# Patient Record
Sex: MALE | Race: WHITE | Employment: UNEMPLOYED | URBAN - METROPOLITAN AREA
[De-identification: names, ages, dates, MRNs, and addresses within clinical notes are randomized per-mention and may not be internally consistent; named-entity substitution may affect disease eponyms.]

---

## 2018-01-01 ENCOUNTER — APPOINTMENT (OUTPATIENT)
Dept: GENERAL RADIOLOGY | Facility: CLINIC | Age: 58
End: 2018-01-01
Attending: ANESTHESIOLOGY
Payer: COMMERCIAL

## 2018-01-01 ENCOUNTER — HOSPITAL ENCOUNTER (INPATIENT)
Facility: CLINIC | Age: 58
LOS: 3 days | Discharge: NURSING, REHAB OR LONG-TERM CARE | End: 2018-05-31
Attending: FAMILY MEDICINE | Admitting: ANESTHESIOLOGY
Payer: COMMERCIAL

## 2018-01-01 ENCOUNTER — APPOINTMENT (OUTPATIENT)
Dept: GENERAL RADIOLOGY | Facility: CLINIC | Age: 58
End: 2018-01-01
Attending: FAMILY MEDICINE
Payer: COMMERCIAL

## 2018-01-01 ENCOUNTER — APPOINTMENT (OUTPATIENT)
Dept: OCCUPATIONAL THERAPY | Facility: CLINIC | Age: 58
End: 2018-01-01
Payer: COMMERCIAL

## 2018-01-01 ENCOUNTER — APPOINTMENT (OUTPATIENT)
Dept: OCCUPATIONAL THERAPY | Facility: CLINIC | Age: 58
End: 2018-01-01
Attending: ANESTHESIOLOGY
Payer: COMMERCIAL

## 2018-01-01 ENCOUNTER — APPOINTMENT (OUTPATIENT)
Dept: CT IMAGING | Facility: CLINIC | Age: 58
End: 2018-01-01
Attending: FAMILY MEDICINE
Payer: COMMERCIAL

## 2018-01-01 VITALS
RESPIRATION RATE: 14 BRPM | DIASTOLIC BLOOD PRESSURE: 80 MMHG | HEART RATE: 114 BPM | HEIGHT: 74 IN | BODY MASS INDEX: 29.65 KG/M2 | TEMPERATURE: 97.9 F | SYSTOLIC BLOOD PRESSURE: 117 MMHG | OXYGEN SATURATION: 98 % | WEIGHT: 231.04 LBS

## 2018-01-01 DIAGNOSIS — K21.9 GASTROESOPHAGEAL REFLUX DISEASE, ESOPHAGITIS PRESENCE NOT SPECIFIED: ICD-10-CM

## 2018-01-01 DIAGNOSIS — K92.2 GI BLEED: ICD-10-CM

## 2018-01-01 DIAGNOSIS — N17.9 AKI (ACUTE KIDNEY INJURY) (H): ICD-10-CM

## 2018-01-01 DIAGNOSIS — A04.72 C. DIFFICILE COLITIS: Primary | ICD-10-CM

## 2018-01-01 DIAGNOSIS — C67.9 MALIGNANT NEOPLASM OF URINARY BLADDER, UNSPECIFIED SITE (H): ICD-10-CM

## 2018-01-01 DIAGNOSIS — E86.0 DEHYDRATION: ICD-10-CM

## 2018-01-01 DIAGNOSIS — I95.89 HYPOTENSION DUE TO BLOOD LOSS: ICD-10-CM

## 2018-01-01 DIAGNOSIS — R58 HYPOTENSION DUE TO BLOOD LOSS: ICD-10-CM

## 2018-01-01 DIAGNOSIS — T83.83XA NEPHROSTOMY TUBE BLEED (H): ICD-10-CM

## 2018-01-01 DIAGNOSIS — R31.0 HEMATURIA, GROSS: ICD-10-CM

## 2018-01-01 DIAGNOSIS — G47.34 NOCTURNAL HYPOXIA: ICD-10-CM

## 2018-01-01 LAB
ABO + RH BLD: NORMAL
ABO + RH BLD: NORMAL
ALBUMIN SERPL-MCNC: 1.4 G/DL (ref 3.4–5)
ALBUMIN SERPL-MCNC: 1.4 G/DL (ref 3.4–5)
ALBUMIN SERPL-MCNC: 1.5 G/DL (ref 3.4–5)
ALBUMIN SERPL-MCNC: 1.5 G/DL (ref 3.4–5)
ALBUMIN SERPL-MCNC: 1.6 G/DL (ref 3.4–5)
ALBUMIN UR-MCNC: 100 MG/DL
ALBUMIN UR-MCNC: 300 MG/DL
ALP SERPL-CCNC: 129 U/L (ref 40–150)
ALP SERPL-CCNC: 133 U/L (ref 40–150)
ALP SERPL-CCNC: 159 U/L (ref 40–150)
ALP SERPL-CCNC: 166 U/L (ref 40–150)
ALP SERPL-CCNC: 205 U/L (ref 40–150)
ALT SERPL W P-5'-P-CCNC: 11 U/L (ref 0–70)
ALT SERPL W P-5'-P-CCNC: 13 U/L (ref 0–70)
ALT SERPL W P-5'-P-CCNC: 18 U/L (ref 0–70)
ALT SERPL W P-5'-P-CCNC: 27 U/L (ref 0–70)
ALT SERPL W P-5'-P-CCNC: 29 U/L (ref 0–70)
ANION GAP SERPL CALCULATED.3IONS-SCNC: 10 MMOL/L (ref 3–14)
ANION GAP SERPL CALCULATED.3IONS-SCNC: 11 MMOL/L (ref 3–14)
ANION GAP SERPL CALCULATED.3IONS-SCNC: 11 MMOL/L (ref 3–14)
ANION GAP SERPL CALCULATED.3IONS-SCNC: 9 MMOL/L (ref 3–14)
APPEARANCE UR: ABNORMAL
APPEARANCE UR: ABNORMAL
APTT PPP: 29 SEC (ref 22–37)
AST SERPL W P-5'-P-CCNC: 22 U/L (ref 0–45)
AST SERPL W P-5'-P-CCNC: 24 U/L (ref 0–45)
AST SERPL W P-5'-P-CCNC: 30 U/L (ref 0–45)
AST SERPL W P-5'-P-CCNC: 52 U/L (ref 0–45)
AST SERPL W P-5'-P-CCNC: 54 U/L (ref 0–45)
BACTERIA #/AREA URNS HPF: ABNORMAL /HPF
BACTERIA #/AREA URNS HPF: ABNORMAL /HPF
BACTERIA SPEC CULT: ABNORMAL
BACTERIA SPEC CULT: ABNORMAL
BACTERIA SPEC CULT: NO GROWTH
BACTERIA SPEC CULT: NORMAL
BASE DEFICIT BLDA-SCNC: 3.2 MMOL/L
BASE DEFICIT BLDA-SCNC: 3.6 MMOL/L
BASOPHILS # BLD AUTO: 0 10E9/L (ref 0–0.2)
BASOPHILS NFR BLD AUTO: 0.1 %
BASOPHILS NFR BLD AUTO: 0.2 %
BILIRUB SERPL-MCNC: 0.3 MG/DL (ref 0.2–1.3)
BILIRUB SERPL-MCNC: 0.3 MG/DL (ref 0.2–1.3)
BILIRUB SERPL-MCNC: 0.4 MG/DL (ref 0.2–1.3)
BILIRUB UR QL STRIP: NEGATIVE
BILIRUB UR QL STRIP: NEGATIVE
BLD GP AB SCN SERPL QL: NORMAL
BLD PROD TYP BPU: NORMAL
BLD UNIT ID BPU: 0
BLOOD BANK CMNT PATIENT-IMP: NORMAL
BLOOD PRODUCT CODE: NORMAL
BPU ID: NORMAL
BUN SERPL-MCNC: 26 MG/DL (ref 7–30)
BUN SERPL-MCNC: 27 MG/DL (ref 7–30)
BUN SERPL-MCNC: 31 MG/DL (ref 7–30)
BUN SERPL-MCNC: 37 MG/DL (ref 7–30)
BUN SERPL-MCNC: 46 MG/DL (ref 7–30)
BUN SERPL-MCNC: 47 MG/DL (ref 7–30)
C DIFF TOX B STL QL: POSITIVE
CALCIUM SERPL-MCNC: 8.3 MG/DL (ref 8.5–10.1)
CALCIUM SERPL-MCNC: 8.4 MG/DL (ref 8.5–10.1)
CALCIUM SERPL-MCNC: 8.4 MG/DL (ref 8.5–10.1)
CALCIUM SERPL-MCNC: 8.5 MG/DL (ref 8.5–10.1)
CALCIUM SERPL-MCNC: 8.5 MG/DL (ref 8.5–10.1)
CALCIUM SERPL-MCNC: 8.7 MG/DL (ref 8.5–10.1)
CHLORIDE SERPL-SCNC: 102 MMOL/L (ref 94–109)
CHLORIDE SERPL-SCNC: 104 MMOL/L (ref 94–109)
CHLORIDE SERPL-SCNC: 111 MMOL/L (ref 94–109)
CHLORIDE SERPL-SCNC: 114 MMOL/L (ref 94–109)
CHLORIDE SERPL-SCNC: 116 MMOL/L (ref 94–109)
CHLORIDE SERPL-SCNC: 116 MMOL/L (ref 94–109)
CO2 SERPL-SCNC: 21 MMOL/L (ref 20–32)
CO2 SERPL-SCNC: 22 MMOL/L (ref 20–32)
CO2 SERPL-SCNC: 22 MMOL/L (ref 20–32)
CO2 SERPL-SCNC: 24 MMOL/L (ref 20–32)
COLOR UR AUTO: ABNORMAL
COLOR UR AUTO: YELLOW
CREAT SERPL-MCNC: 0.99 MG/DL (ref 0.66–1.25)
CREAT SERPL-MCNC: 1.02 MG/DL (ref 0.66–1.25)
CREAT SERPL-MCNC: 1.05 MG/DL (ref 0.66–1.25)
CREAT SERPL-MCNC: 1.22 MG/DL (ref 0.66–1.25)
CREAT SERPL-MCNC: 1.55 MG/DL (ref 0.66–1.25)
CREAT SERPL-MCNC: 1.72 MG/DL (ref 0.66–1.25)
DIFFERENTIAL METHOD BLD: ABNORMAL
EOSINOPHIL # BLD AUTO: 0.1 10E9/L (ref 0–0.7)
EOSINOPHIL # BLD AUTO: 0.1 10E9/L (ref 0–0.7)
EOSINOPHIL # BLD AUTO: 0.2 10E9/L (ref 0–0.7)
EOSINOPHIL # BLD AUTO: 0.2 10E9/L (ref 0–0.7)
EOSINOPHIL NFR BLD AUTO: 0.4 %
EOSINOPHIL NFR BLD AUTO: 0.4 %
EOSINOPHIL NFR BLD AUTO: 1.4 %
EOSINOPHIL NFR BLD AUTO: 1.9 %
ERYTHROCYTE [DISTWIDTH] IN BLOOD BY AUTOMATED COUNT: 16.7 % (ref 10–15)
ERYTHROCYTE [DISTWIDTH] IN BLOOD BY AUTOMATED COUNT: 16.8 % (ref 10–15)
ERYTHROCYTE [DISTWIDTH] IN BLOOD BY AUTOMATED COUNT: 16.9 % (ref 10–15)
ERYTHROCYTE [DISTWIDTH] IN BLOOD BY AUTOMATED COUNT: 16.9 % (ref 10–15)
ERYTHROCYTE [DISTWIDTH] IN BLOOD BY AUTOMATED COUNT: 17 % (ref 10–15)
ERYTHROCYTE [DISTWIDTH] IN BLOOD BY AUTOMATED COUNT: 17.1 % (ref 10–15)
GFR SERPL CREATININE-BSD FRML MDRD: 41 ML/MIN/1.7M2
GFR SERPL CREATININE-BSD FRML MDRD: 46 ML/MIN/1.7M2
GFR SERPL CREATININE-BSD FRML MDRD: 61 ML/MIN/1.7M2
GFR SERPL CREATININE-BSD FRML MDRD: 72 ML/MIN/1.7M2
GFR SERPL CREATININE-BSD FRML MDRD: 75 ML/MIN/1.7M2
GFR SERPL CREATININE-BSD FRML MDRD: 77 ML/MIN/1.7M2
GLUCOSE SERPL-MCNC: 102 MG/DL (ref 70–99)
GLUCOSE SERPL-MCNC: 113 MG/DL (ref 70–99)
GLUCOSE SERPL-MCNC: 113 MG/DL (ref 70–99)
GLUCOSE SERPL-MCNC: 146 MG/DL (ref 70–99)
GLUCOSE SERPL-MCNC: 149 MG/DL (ref 70–99)
GLUCOSE SERPL-MCNC: 86 MG/DL (ref 70–99)
GLUCOSE UR STRIP-MCNC: NEGATIVE MG/DL
GLUCOSE UR STRIP-MCNC: NEGATIVE MG/DL
GRAM STN SPEC: NORMAL
GRAN CASTS #/AREA URNS LPF: 31 /LPF
HCO3 BLD-SCNC: 21 MMOL/L (ref 21–28)
HCO3 BLD-SCNC: 21 MMOL/L (ref 21–28)
HCT VFR BLD AUTO: 18.8 % (ref 40–53)
HCT VFR BLD AUTO: 21.7 % (ref 40–53)
HCT VFR BLD AUTO: 22.7 % (ref 40–53)
HCT VFR BLD AUTO: 24.4 % (ref 40–53)
HCT VFR BLD AUTO: 26.9 % (ref 40–53)
HCT VFR BLD AUTO: 27.3 % (ref 40–53)
HGB BLD-MCNC: 5.5 G/DL (ref 13.3–17.7)
HGB BLD-MCNC: 6.5 G/DL (ref 13.3–17.7)
HGB BLD-MCNC: 6.6 G/DL (ref 13.3–17.7)
HGB BLD-MCNC: 6.9 G/DL (ref 13.3–17.7)
HGB BLD-MCNC: 7.1 G/DL (ref 13.3–17.7)
HGB BLD-MCNC: 7.2 G/DL (ref 13.3–17.7)
HGB BLD-MCNC: 7.4 G/DL (ref 13.3–17.7)
HGB BLD-MCNC: 7.5 G/DL (ref 13.3–17.7)
HGB BLD-MCNC: 7.6 G/DL (ref 13.3–17.7)
HGB BLD-MCNC: 7.9 G/DL (ref 13.3–17.7)
HGB BLD-MCNC: 8.2 G/DL (ref 13.3–17.7)
HGB BLD-MCNC: 8.3 G/DL (ref 13.3–17.7)
HGB UR QL STRIP: ABNORMAL
HGB UR QL STRIP: ABNORMAL
HYALINE CASTS #/AREA URNS LPF: 31 /LPF (ref 0–2)
HYALINE CASTS #/AREA URNS LPF: 33 /LPF (ref 0–2)
IMM GRANULOCYTES # BLD: 0 10E9/L (ref 0–0.4)
IMM GRANULOCYTES # BLD: 0.1 10E9/L (ref 0–0.4)
IMM GRANULOCYTES NFR BLD: 0.4 %
IMM GRANULOCYTES NFR BLD: 0.4 %
IMM GRANULOCYTES NFR BLD: 0.5 %
IMM GRANULOCYTES NFR BLD: 0.6 %
INR PPP: 1.16 (ref 0.86–1.14)
INR PPP: 1.18 (ref 0.86–1.14)
INR PPP: 1.2 (ref 0.86–1.14)
INR PPP: 1.21 (ref 0.86–1.14)
INR PPP: 1.27 (ref 0.86–1.14)
INTERPRETATION ECG - MUSE: NORMAL
KETONES UR STRIP-MCNC: NEGATIVE MG/DL
KETONES UR STRIP-MCNC: NEGATIVE MG/DL
LACTATE BLD-SCNC: 2.4 MMOL/L (ref 0.7–2)
LEUKOCYTE ESTERASE UR QL STRIP: ABNORMAL
LEUKOCYTE ESTERASE UR QL STRIP: ABNORMAL
LYMPHOCYTES # BLD AUTO: 0.6 10E9/L (ref 0.8–5.3)
LYMPHOCYTES # BLD AUTO: 0.7 10E9/L (ref 0.8–5.3)
LYMPHOCYTES # BLD AUTO: 0.7 10E9/L (ref 0.8–5.3)
LYMPHOCYTES # BLD AUTO: 1 10E9/L (ref 0.8–5.3)
LYMPHOCYTES NFR BLD AUTO: 4.5 %
LYMPHOCYTES NFR BLD AUTO: 5.5 %
LYMPHOCYTES NFR BLD AUTO: 6.5 %
LYMPHOCYTES NFR BLD AUTO: 7.5 %
Lab: ABNORMAL
Lab: NORMAL
MAGNESIUM SERPL-MCNC: 2.5 MG/DL (ref 1.6–2.3)
MCH RBC QN AUTO: 24.1 PG (ref 26.5–33)
MCH RBC QN AUTO: 24.8 PG (ref 26.5–33)
MCH RBC QN AUTO: 25.5 PG (ref 26.5–33)
MCH RBC QN AUTO: 25.6 PG (ref 26.5–33)
MCH RBC QN AUTO: 25.6 PG (ref 26.5–33)
MCH RBC QN AUTO: 25.8 PG (ref 26.5–33)
MCHC RBC AUTO-ENTMCNC: 29.3 G/DL (ref 31.5–36.5)
MCHC RBC AUTO-ENTMCNC: 29.4 G/DL (ref 31.5–36.5)
MCHC RBC AUTO-ENTMCNC: 30 G/DL (ref 31.5–36.5)
MCHC RBC AUTO-ENTMCNC: 30 G/DL (ref 31.5–36.5)
MCHC RBC AUTO-ENTMCNC: 30.4 G/DL (ref 31.5–36.5)
MCHC RBC AUTO-ENTMCNC: 30.7 G/DL (ref 31.5–36.5)
MCV RBC AUTO: 83 FL (ref 78–100)
MCV RBC AUTO: 83 FL (ref 78–100)
MCV RBC AUTO: 84 FL (ref 78–100)
MCV RBC AUTO: 84 FL (ref 78–100)
MCV RBC AUTO: 85 FL (ref 78–100)
MCV RBC AUTO: 87 FL (ref 78–100)
MONOCYTES # BLD AUTO: 0.4 10E9/L (ref 0–1.3)
MONOCYTES # BLD AUTO: 0.5 10E9/L (ref 0–1.3)
MONOCYTES # BLD AUTO: 0.6 10E9/L (ref 0–1.3)
MONOCYTES # BLD AUTO: 0.8 10E9/L (ref 0–1.3)
MONOCYTES NFR BLD AUTO: 3 %
MONOCYTES NFR BLD AUTO: 4 %
MONOCYTES NFR BLD AUTO: 5.7 %
MONOCYTES NFR BLD AUTO: 6.3 %
MRSA DNA SPEC QL NAA+PROBE: NEGATIVE
MUCOUS THREADS #/AREA URNS LPF: PRESENT /LPF
MUCOUS THREADS #/AREA URNS LPF: PRESENT /LPF
NEUTROPHILS # BLD AUTO: 11 10E9/L (ref 1.6–8.3)
NEUTROPHILS # BLD AUTO: 11.4 10E9/L (ref 1.6–8.3)
NEUTROPHILS # BLD AUTO: 12.4 10E9/L (ref 1.6–8.3)
NEUTROPHILS # BLD AUTO: 9 10E9/L (ref 1.6–8.3)
NEUTROPHILS NFR BLD AUTO: 85.3 %
NEUTROPHILS NFR BLD AUTO: 86.5 %
NEUTROPHILS NFR BLD AUTO: 87.1 %
NEUTROPHILS NFR BLD AUTO: 91.6 %
NITRATE UR QL: NEGATIVE
NITRATE UR QL: POSITIVE
NRBC # BLD AUTO: 0 10*3/UL
NRBC BLD AUTO-RTO: 0 /100
NUM BPU REQUESTED: 5
O2/TOTAL GAS SETTING VFR VENT: ABNORMAL %
O2/TOTAL GAS SETTING VFR VENT: NORMAL %
PCO2 BLD: 35 MM HG (ref 35–45)
PCO2 BLD: 35 MM HG (ref 35–45)
PH BLD: 7.39 PH (ref 7.35–7.45)
PH BLD: 7.4 PH (ref 7.35–7.45)
PH UR STRIP: 5.5 PH (ref 5–7)
PH UR STRIP: 6 PH (ref 5–7)
PLATELET # BLD AUTO: 360 10E9/L (ref 150–450)
PLATELET # BLD AUTO: 360 10E9/L (ref 150–450)
PLATELET # BLD AUTO: 368 10E9/L (ref 150–450)
PLATELET # BLD AUTO: 377 10E9/L (ref 150–450)
PLATELET # BLD AUTO: 396 10E9/L (ref 150–450)
PLATELET # BLD AUTO: 421 10E9/L (ref 150–450)
PO2 BLD: 76 MM HG (ref 80–105)
PO2 BLD: 90 MM HG (ref 80–105)
POTASSIUM SERPL-SCNC: 3.4 MMOL/L (ref 3.4–5.3)
POTASSIUM SERPL-SCNC: 4.1 MMOL/L (ref 3.4–5.3)
POTASSIUM SERPL-SCNC: 4.5 MMOL/L (ref 3.4–5.3)
POTASSIUM SERPL-SCNC: 4.5 MMOL/L (ref 3.4–5.3)
PROCALCITONIN SERPL-MCNC: 11.54 NG/ML
PROT SERPL-MCNC: 5.7 G/DL (ref 6.8–8.8)
PROT SERPL-MCNC: 5.7 G/DL (ref 6.8–8.8)
PROT SERPL-MCNC: 5.9 G/DL (ref 6.8–8.8)
PROT SERPL-MCNC: 5.9 G/DL (ref 6.8–8.8)
PROT SERPL-MCNC: 6.2 G/DL (ref 6.8–8.8)
RBC # BLD AUTO: 2.28 10E12/L (ref 4.4–5.9)
RBC # BLD AUTO: 2.62 10E12/L (ref 4.4–5.9)
RBC # BLD AUTO: 2.71 10E12/L (ref 4.4–5.9)
RBC # BLD AUTO: 2.91 10E12/L (ref 4.4–5.9)
RBC # BLD AUTO: 3.08 10E12/L (ref 4.4–5.9)
RBC # BLD AUTO: 3.2 10E12/L (ref 4.4–5.9)
RBC #/AREA URNS AUTO: 112 /HPF (ref 0–2)
RBC #/AREA URNS AUTO: 1923 /HPF (ref 0–2)
SODIUM SERPL-SCNC: 134 MMOL/L (ref 133–144)
SODIUM SERPL-SCNC: 138 MMOL/L (ref 133–144)
SODIUM SERPL-SCNC: 143 MMOL/L (ref 133–144)
SODIUM SERPL-SCNC: 146 MMOL/L (ref 133–144)
SODIUM SERPL-SCNC: 146 MMOL/L (ref 133–144)
SODIUM SERPL-SCNC: 147 MMOL/L (ref 133–144)
SOURCE: ABNORMAL
SOURCE: ABNORMAL
SP GR UR STRIP: 1.02 (ref 1–1.03)
SP GR UR STRIP: 1.02 (ref 1–1.03)
SPECIMEN EXP DATE BLD: NORMAL
SPECIMEN SOURCE: ABNORMAL
SPECIMEN SOURCE: ABNORMAL
SPECIMEN SOURCE: NORMAL
TRANS CELLS #/AREA URNS HPF: 2 /HPF (ref 0–1)
TRANSFUSION RXN BLOOD BANK NOTIFICATION: NORMAL
TRANSFUSION RXN BLOOD BANK NOTIFICATION: NORMAL
TRANSFUSION STATUS PATIENT QL: NORMAL
TROPONIN I SERPL-MCNC: <0.015 UG/L (ref 0–0.04)
UROBILINOGEN UR STRIP-MCNC: NORMAL MG/DL (ref 0–2)
UROBILINOGEN UR STRIP-MCNC: NORMAL MG/DL (ref 0–2)
WBC # BLD AUTO: 10.6 10E9/L (ref 4–11)
WBC # BLD AUTO: 11.3 10E9/L (ref 4–11)
WBC # BLD AUTO: 12 10E9/L (ref 4–11)
WBC # BLD AUTO: 12.7 10E9/L (ref 4–11)
WBC # BLD AUTO: 13.1 10E9/L (ref 4–11)
WBC # BLD AUTO: 13.5 10E9/L (ref 4–11)
WBC #/AREA URNS AUTO: 2853 /HPF (ref 0–5)
WBC #/AREA URNS AUTO: >182 /HPF (ref 0–5)
WBC CLUMPS #/AREA URNS HPF: PRESENT /HPF
WBC CLUMPS #/AREA URNS HPF: PRESENT /HPF
YEAST #/AREA URNS HPF: ABNORMAL /HPF
YEAST #/AREA URNS HPF: ABNORMAL /HPF

## 2018-01-01 PROCEDURE — 25000128 H RX IP 250 OP 636: Performed by: FAMILY MEDICINE

## 2018-01-01 PROCEDURE — 3E043XZ INTRODUCTION OF VASOPRESSOR INTO CENTRAL VEIN, PERCUTANEOUS APPROACH: ICD-10-PCS | Performed by: ANESTHESIOLOGY

## 2018-01-01 PROCEDURE — 80053 COMPREHEN METABOLIC PANEL: CPT | Performed by: ANESTHESIOLOGY

## 2018-01-01 PROCEDURE — 81001 URINALYSIS AUTO W/SCOPE: CPT | Performed by: FAMILY MEDICINE

## 2018-01-01 PROCEDURE — 86923 COMPATIBILITY TEST ELECTRIC: CPT | Performed by: FAMILY MEDICINE

## 2018-01-01 PROCEDURE — 25000125 ZZHC RX 250: Performed by: INTERNAL MEDICINE

## 2018-01-01 PROCEDURE — 86901 BLOOD TYPING SEROLOGIC RH(D): CPT | Performed by: FAMILY MEDICINE

## 2018-01-01 PROCEDURE — 20000004 ZZH R&B ICU UMMC

## 2018-01-01 PROCEDURE — 40000133 ZZH STATISTIC OT WARD VISIT

## 2018-01-01 PROCEDURE — 85018 HEMOGLOBIN: CPT | Performed by: ANESTHESIOLOGY

## 2018-01-01 PROCEDURE — 85025 COMPLETE CBC W/AUTO DIFF WBC: CPT | Performed by: ANESTHESIOLOGY

## 2018-01-01 PROCEDURE — 99233 SBSQ HOSP IP/OBS HIGH 50: CPT | Mod: GC | Performed by: INTERNAL MEDICINE

## 2018-01-01 PROCEDURE — 97110 THERAPEUTIC EXERCISES: CPT | Mod: GO

## 2018-01-01 PROCEDURE — 85018 HEMOGLOBIN: CPT | Performed by: STUDENT IN AN ORGANIZED HEALTH CARE EDUCATION/TRAINING PROGRAM

## 2018-01-01 PROCEDURE — 25000125 ZZHC RX 250: Performed by: FAMILY MEDICINE

## 2018-01-01 PROCEDURE — 36415 COLL VENOUS BLD VENIPUNCTURE: CPT | Performed by: ANESTHESIOLOGY

## 2018-01-01 PROCEDURE — 85610 PROTHROMBIN TIME: CPT | Performed by: FAMILY MEDICINE

## 2018-01-01 PROCEDURE — 82803 BLOOD GASES ANY COMBINATION: CPT

## 2018-01-01 PROCEDURE — 36600 WITHDRAWAL OF ARTERIAL BLOOD: CPT

## 2018-01-01 PROCEDURE — 99223 1ST HOSP IP/OBS HIGH 75: CPT | Performed by: NURSE PRACTITIONER

## 2018-01-01 PROCEDURE — 25000128 H RX IP 250 OP 636

## 2018-01-01 PROCEDURE — 85610 PROTHROMBIN TIME: CPT | Performed by: ANESTHESIOLOGY

## 2018-01-01 PROCEDURE — 93005 ELECTROCARDIOGRAM TRACING: CPT

## 2018-01-01 PROCEDURE — 82803 BLOOD GASES ANY COMBINATION: CPT | Performed by: PEDIATRICS

## 2018-01-01 PROCEDURE — 97530 THERAPEUTIC ACTIVITIES: CPT | Mod: GO

## 2018-01-01 PROCEDURE — 99291 CRITICAL CARE FIRST HOUR: CPT | Mod: 25 | Performed by: FAMILY MEDICINE

## 2018-01-01 PROCEDURE — 84145 PROCALCITONIN (PCT): CPT | Performed by: INTERNAL MEDICINE

## 2018-01-01 PROCEDURE — 12000008 ZZH R&B INTERMEDIATE UMMC

## 2018-01-01 PROCEDURE — 97110 THERAPEUTIC EXERCISES: CPT | Mod: GO | Performed by: OCCUPATIONAL THERAPIST

## 2018-01-01 PROCEDURE — 85730 THROMBOPLASTIN TIME PARTIAL: CPT | Performed by: FAMILY MEDICINE

## 2018-01-01 PROCEDURE — 36556 INSERT NON-TUNNEL CV CATH: CPT | Performed by: ANESTHESIOLOGY

## 2018-01-01 PROCEDURE — 87040 BLOOD CULTURE FOR BACTERIA: CPT | Performed by: INTERNAL MEDICINE

## 2018-01-01 PROCEDURE — 86900 BLOOD TYPING SEROLOGIC ABO: CPT | Performed by: FAMILY MEDICINE

## 2018-01-01 PROCEDURE — 99285 EMERGENCY DEPT VISIT HI MDM: CPT | Mod: 25

## 2018-01-01 PROCEDURE — 25000132 ZZH RX MED GY IP 250 OP 250 PS 637: Performed by: STUDENT IN AN ORGANIZED HEALTH CARE EDUCATION/TRAINING PROGRAM

## 2018-01-01 PROCEDURE — 25000128 H RX IP 250 OP 636: Performed by: STUDENT IN AN ORGANIZED HEALTH CARE EDUCATION/TRAINING PROGRAM

## 2018-01-01 PROCEDURE — 80053 COMPREHEN METABOLIC PANEL: CPT | Performed by: FAMILY MEDICINE

## 2018-01-01 PROCEDURE — 84484 ASSAY OF TROPONIN QUANT: CPT | Performed by: FAMILY MEDICINE

## 2018-01-01 PROCEDURE — 86850 RBC ANTIBODY SCREEN: CPT | Performed by: FAMILY MEDICINE

## 2018-01-01 PROCEDURE — 83605 ASSAY OF LACTIC ACID: CPT | Performed by: INTERNAL MEDICINE

## 2018-01-01 PROCEDURE — 85025 COMPLETE CBC W/AUTO DIFF WBC: CPT | Performed by: FAMILY MEDICINE

## 2018-01-01 PROCEDURE — 40000341 ZZHCL STATISTIC BB TRANSF RXN INVEST: Performed by: ANESTHESIOLOGY

## 2018-01-01 PROCEDURE — 40000133 ZZH STATISTIC OT WARD VISIT: Performed by: OCCUPATIONAL THERAPIST

## 2018-01-01 PROCEDURE — 87640 STAPH A DNA AMP PROBE: CPT | Performed by: ANESTHESIOLOGY

## 2018-01-01 PROCEDURE — 87086 URINE CULTURE/COLONY COUNT: CPT | Performed by: FAMILY MEDICINE

## 2018-01-01 PROCEDURE — 36415 COLL VENOUS BLD VENIPUNCTURE: CPT | Performed by: INTERNAL MEDICINE

## 2018-01-01 PROCEDURE — 94660 CPAP INITIATION&MGMT: CPT

## 2018-01-01 PROCEDURE — 83735 ASSAY OF MAGNESIUM: CPT | Performed by: FAMILY MEDICINE

## 2018-01-01 PROCEDURE — 25000132 ZZH RX MED GY IP 250 OP 250 PS 637: Performed by: INTERNAL MEDICINE

## 2018-01-01 PROCEDURE — 36430 TRANSFUSION BLD/BLD COMPNT: CPT

## 2018-01-01 PROCEDURE — 80048 BASIC METABOLIC PNL TOTAL CA: CPT | Performed by: INTERNAL MEDICINE

## 2018-01-01 PROCEDURE — 25000128 H RX IP 250 OP 636: Performed by: INTERNAL MEDICINE

## 2018-01-01 PROCEDURE — 87641 MR-STAPH DNA AMP PROBE: CPT | Performed by: ANESTHESIOLOGY

## 2018-01-01 PROCEDURE — 87186 SC STD MICRODIL/AGAR DIL: CPT | Performed by: FAMILY MEDICINE

## 2018-01-01 PROCEDURE — 93010 ELECTROCARDIOGRAM REPORT: CPT | Mod: Z6 | Performed by: FAMILY MEDICINE

## 2018-01-01 PROCEDURE — 87088 URINE BACTERIA CULTURE: CPT | Performed by: FAMILY MEDICINE

## 2018-01-01 PROCEDURE — 12000001 ZZH R&B MED SURG/OB UMMC

## 2018-01-01 PROCEDURE — 96365 THER/PROPH/DIAG IV INF INIT: CPT

## 2018-01-01 PROCEDURE — 74176 CT ABD & PELVIS W/O CONTRAST: CPT

## 2018-01-01 PROCEDURE — 40000275 ZZH STATISTIC RCP TIME EA 10 MIN

## 2018-01-01 PROCEDURE — 87106 FUNGI IDENTIFICATION YEAST: CPT | Performed by: FAMILY MEDICINE

## 2018-01-01 PROCEDURE — 25000132 ZZH RX MED GY IP 250 OP 250 PS 637: Performed by: ANESTHESIOLOGY

## 2018-01-01 PROCEDURE — 99291 CRITICAL CARE FIRST HOUR: CPT | Mod: 25 | Performed by: ANESTHESIOLOGY

## 2018-01-01 PROCEDURE — 40000556 ZZH STATISTIC PERIPHERAL IV START W US GUIDANCE

## 2018-01-01 PROCEDURE — 25000128 H RX IP 250 OP 636: Performed by: ANESTHESIOLOGY

## 2018-01-01 PROCEDURE — 87493 C DIFF AMPLIFIED PROBE: CPT | Performed by: STUDENT IN AN ORGANIZED HEALTH CARE EDUCATION/TRAINING PROGRAM

## 2018-01-01 PROCEDURE — 71045 X-RAY EXAM CHEST 1 VIEW: CPT

## 2018-01-01 PROCEDURE — 99238 HOSP IP/OBS DSCHRG MGMT 30/<: CPT | Mod: GC | Performed by: INTERNAL MEDICINE

## 2018-01-01 PROCEDURE — 97165 OT EVAL LOW COMPLEX 30 MIN: CPT | Mod: GO

## 2018-01-01 PROCEDURE — P9041 ALBUMIN (HUMAN),5%, 50ML: HCPCS

## 2018-01-01 PROCEDURE — P9016 RBC LEUKOCYTES REDUCED: HCPCS | Performed by: FAMILY MEDICINE

## 2018-01-01 PROCEDURE — 85027 COMPLETE CBC AUTOMATED: CPT | Performed by: ANESTHESIOLOGY

## 2018-01-01 RX ORDER — OXYCODONE HYDROCHLORIDE 5 MG/1
5 TABLET ORAL 2 TIMES DAILY PRN
Status: DISCONTINUED | OUTPATIENT
Start: 2018-01-01 | End: 2018-01-01 | Stop reason: HOSPADM

## 2018-01-01 RX ORDER — POTASSIUM CHLORIDE 1.5 G/1.58G
40 POWDER, FOR SOLUTION ORAL ONCE
Status: DISCONTINUED | OUTPATIENT
Start: 2018-01-01 | End: 2018-01-01

## 2018-01-01 RX ORDER — ALBUMIN (HUMAN) 12.5 G/50ML
25 SOLUTION INTRAVENOUS ONCE
Status: DISCONTINUED | OUTPATIENT
Start: 2018-01-01 | End: 2018-01-01

## 2018-01-01 RX ORDER — LIDOCAINE 50 MG/G
1 PATCH TOPICAL EVERY 24 HOURS
COMMUNITY

## 2018-01-01 RX ORDER — ALBUMIN, HUMAN INJ 5% 5 %
SOLUTION INTRAVENOUS
Status: COMPLETED
Start: 2018-01-01 | End: 2018-01-01

## 2018-01-01 RX ORDER — SODIUM CHLORIDE 9 MG/ML
INJECTION, SOLUTION INTRAVENOUS
Status: COMPLETED
Start: 2018-01-01 | End: 2018-01-01

## 2018-01-01 RX ORDER — LIDOCAINE 40 MG/G
CREAM TOPICAL
Status: DISCONTINUED | OUTPATIENT
Start: 2018-01-01 | End: 2018-01-01 | Stop reason: HOSPADM

## 2018-01-01 RX ORDER — VANCOMYCIN HYDROCHLORIDE 125 MG/1
125 CAPSULE ORAL DAILY
DISCHARGE
Start: 2018-01-01

## 2018-01-01 RX ORDER — PANTOPRAZOLE SODIUM 40 MG/1
40 TABLET, DELAYED RELEASE ORAL EVERY MORNING
Status: DISCONTINUED | OUTPATIENT
Start: 2018-01-01 | End: 2018-01-01 | Stop reason: HOSPADM

## 2018-01-01 RX ORDER — OXYCODONE HYDROCHLORIDE 5 MG/1
5 TABLET ORAL EVERY 4 HOURS PRN
Status: DISCONTINUED | OUTPATIENT
Start: 2018-01-01 | End: 2018-01-01

## 2018-01-01 RX ORDER — SODIUM CHLORIDE 9 MG/ML
INJECTION, SOLUTION INTRAVENOUS CONTINUOUS
Status: DISCONTINUED | OUTPATIENT
Start: 2018-01-01 | End: 2018-01-01

## 2018-01-01 RX ORDER — PIPERACILLIN SODIUM, TAZOBACTAM SODIUM 4; .5 G/20ML; G/20ML
4.5 INJECTION, POWDER, LYOPHILIZED, FOR SOLUTION INTRAVENOUS EVERY 6 HOURS
Status: DISCONTINUED | OUTPATIENT
Start: 2018-01-01 | End: 2018-01-01

## 2018-01-01 RX ORDER — ACETAMINOPHEN 650 MG/1
650 SUPPOSITORY RECTAL EVERY 4 HOURS PRN
Status: DISCONTINUED | OUTPATIENT
Start: 2018-01-01 | End: 2018-01-01 | Stop reason: HOSPADM

## 2018-01-01 RX ORDER — POTASSIUM CHLORIDE 1500 MG/1
60 TABLET, EXTENDED RELEASE ORAL ONCE
Status: COMPLETED | OUTPATIENT
Start: 2018-01-01 | End: 2018-01-01

## 2018-01-01 RX ORDER — ACETAMINOPHEN 325 MG/1
650 TABLET ORAL EVERY 4 HOURS PRN
Status: DISCONTINUED | OUTPATIENT
Start: 2018-01-01 | End: 2018-01-01 | Stop reason: HOSPADM

## 2018-01-01 RX ORDER — POTASSIUM CHLORIDE 750 MG/1
40 TABLET, EXTENDED RELEASE ORAL ONCE
Status: COMPLETED | OUTPATIENT
Start: 2018-01-01 | End: 2018-01-01

## 2018-01-01 RX ORDER — LIDOCAINE 4 G/G
1-2 PATCH TOPICAL
Status: DISCONTINUED | OUTPATIENT
Start: 2018-01-01 | End: 2018-01-01 | Stop reason: HOSPADM

## 2018-01-01 RX ORDER — NALOXONE HYDROCHLORIDE 0.4 MG/ML
.1-.4 INJECTION, SOLUTION INTRAMUSCULAR; INTRAVENOUS; SUBCUTANEOUS
Status: DISCONTINUED | OUTPATIENT
Start: 2018-01-01 | End: 2018-01-01 | Stop reason: HOSPADM

## 2018-01-01 RX ORDER — PANTOPRAZOLE SODIUM 40 MG/1
40 TABLET, DELAYED RELEASE ORAL EVERY MORNING
Qty: 30 TABLET | DISCHARGE
Start: 2018-01-01

## 2018-01-01 RX ORDER — FERROUS SULFATE 325(65) MG
TABLET ORAL
COMMUNITY

## 2018-01-01 RX ORDER — HYDROMORPHONE HCL/0.9% NACL/PF 0.2MG/0.2
0.2 SYRINGE (ML) INTRAVENOUS
Status: DISCONTINUED | OUTPATIENT
Start: 2018-01-01 | End: 2018-01-01

## 2018-01-01 RX ADMIN — SODIUM CHLORIDE 1000 ML: 9 INJECTION, SOLUTION INTRAVENOUS at 23:21

## 2018-01-01 RX ADMIN — SODIUM CHLORIDE: 0.9 INJECTION, SOLUTION INTRAVENOUS at 03:08

## 2018-01-01 RX ADMIN — Medication 125 MG: at 07:58

## 2018-01-01 RX ADMIN — Medication 125 MG: at 15:28

## 2018-01-01 RX ADMIN — OXYCODONE HYDROCHLORIDE 5 MG: 5 TABLET ORAL at 16:14

## 2018-01-01 RX ADMIN — SODIUM CHLORIDE 8 MG/HR: 9 INJECTION, SOLUTION INTRAVENOUS at 19:01

## 2018-01-01 RX ADMIN — VANCOMYCIN HYDROCHLORIDE 2000 MG: 10 INJECTION, POWDER, LYOPHILIZED, FOR SOLUTION INTRAVENOUS at 04:21

## 2018-01-01 RX ADMIN — SODIUM CHLORIDE 8 MG/HR: 9 INJECTION, SOLUTION INTRAVENOUS at 06:52

## 2018-01-01 RX ADMIN — PANTOPRAZOLE SODIUM 40 MG: 40 TABLET, DELAYED RELEASE ORAL at 11:19

## 2018-01-01 RX ADMIN — Medication 0.2 MG: at 09:33

## 2018-01-01 RX ADMIN — SODIUM CHLORIDE 8 MG/HR: 9 INJECTION, SOLUTION INTRAVENOUS at 00:34

## 2018-01-01 RX ADMIN — LIDOCAINE 1 PATCH: 560 PATCH PERCUTANEOUS; TOPICAL; TRANSDERMAL at 17:11

## 2018-01-01 RX ADMIN — Medication 125 MG: at 16:17

## 2018-01-01 RX ADMIN — NOREPINEPHRINE BITARTRATE 0.03 MCG/KG/MIN: 1 INJECTION INTRAVENOUS at 08:25

## 2018-01-01 RX ADMIN — Medication 125 MG: at 22:12

## 2018-01-01 RX ADMIN — SODIUM CHLORIDE: 9 INJECTION, SOLUTION INTRAVENOUS at 09:30

## 2018-01-01 RX ADMIN — PIPERACILLIN SODIUM,TAZOBACTAM SODIUM 4.5 G: 4; .5 INJECTION, POWDER, FOR SOLUTION INTRAVENOUS at 11:02

## 2018-01-01 RX ADMIN — SODIUM CHLORIDE: 9 INJECTION, SOLUTION INTRAVENOUS at 01:03

## 2018-01-01 RX ADMIN — OXYCODONE HYDROCHLORIDE 5 MG: 5 TABLET ORAL at 04:40

## 2018-01-01 RX ADMIN — Medication 125 MG: at 07:53

## 2018-01-01 RX ADMIN — OXYCODONE HYDROCHLORIDE 5 MG: 5 TABLET ORAL at 02:13

## 2018-01-01 RX ADMIN — Medication 125 MG: at 11:39

## 2018-01-01 RX ADMIN — ACETAMINOPHEN 650 MG: 325 TABLET, FILM COATED ORAL at 07:58

## 2018-01-01 RX ADMIN — PIPERACILLIN SODIUM,TAZOBACTAM SODIUM 4.5 G: 4; .5 INJECTION, POWDER, FOR SOLUTION INTRAVENOUS at 04:21

## 2018-01-01 RX ADMIN — SODIUM CHLORIDE: 9 INJECTION, SOLUTION INTRAVENOUS at 18:01

## 2018-01-01 RX ADMIN — OXYCODONE HYDROCHLORIDE 5 MG: 5 TABLET ORAL at 20:39

## 2018-01-01 RX ADMIN — VANCOMYCIN HYDROCHLORIDE 2000 MG: 10 INJECTION, POWDER, LYOPHILIZED, FOR SOLUTION INTRAVENOUS at 15:01

## 2018-01-01 RX ADMIN — POTASSIUM CHLORIDE 40 MEQ: 750 TABLET, EXTENDED RELEASE ORAL at 11:18

## 2018-01-01 RX ADMIN — Medication 125 MG: at 16:08

## 2018-01-01 RX ADMIN — SODIUM CHLORIDE: 9 INJECTION, SOLUTION INTRAVENOUS at 04:37

## 2018-01-01 RX ADMIN — OXYCODONE HYDROCHLORIDE 5 MG: 5 TABLET ORAL at 00:06

## 2018-01-01 RX ADMIN — SODIUM CHLORIDE 8 MG/HR: 9 INJECTION, SOLUTION INTRAVENOUS at 07:42

## 2018-01-01 RX ADMIN — PIPERACILLIN SODIUM,TAZOBACTAM SODIUM 4.5 G: 4; .5 INJECTION, POWDER, FOR SOLUTION INTRAVENOUS at 03:08

## 2018-01-01 RX ADMIN — PIPERACILLIN SODIUM,TAZOBACTAM SODIUM 4.5 G: 4; .5 INJECTION, POWDER, FOR SOLUTION INTRAVENOUS at 22:12

## 2018-01-01 RX ADMIN — VANCOMYCIN HYDROCHLORIDE 2000 MG: 10 INJECTION, POWDER, LYOPHILIZED, FOR SOLUTION INTRAVENOUS at 04:32

## 2018-01-01 RX ADMIN — OXYCODONE HYDROCHLORIDE 5 MG: 5 TABLET ORAL at 13:36

## 2018-01-01 RX ADMIN — PANTOPRAZOLE SODIUM 40 MG: 40 INJECTION, POWDER, FOR SOLUTION INTRAVENOUS at 00:34

## 2018-01-01 RX ADMIN — OXYCODONE HYDROCHLORIDE 5 MG: 5 TABLET ORAL at 04:21

## 2018-01-01 RX ADMIN — SODIUM CHLORIDE: 9 INJECTION, SOLUTION INTRAVENOUS at 02:06

## 2018-01-01 RX ADMIN — ACETAMINOPHEN 650 MG: 325 TABLET, FILM COATED ORAL at 13:36

## 2018-01-01 RX ADMIN — POTASSIUM CHLORIDE 60 MEQ: 1500 TABLET, EXTENDED RELEASE ORAL at 19:40

## 2018-01-01 RX ADMIN — Medication 125 MG: at 19:40

## 2018-01-01 RX ADMIN — Medication 125 MG: at 08:11

## 2018-01-01 RX ADMIN — OXYCODONE HYDROCHLORIDE 5 MG: 5 TABLET ORAL at 16:26

## 2018-01-01 RX ADMIN — PIPERACILLIN SODIUM,TAZOBACTAM SODIUM 4.5 G: 4; .5 INJECTION, POWDER, FOR SOLUTION INTRAVENOUS at 09:35

## 2018-01-01 RX ADMIN — OXYCODONE HYDROCHLORIDE 5 MG: 5 TABLET ORAL at 22:12

## 2018-01-01 RX ADMIN — Medication 125 MG: at 20:39

## 2018-01-01 RX ADMIN — SODIUM CHLORIDE 8 MG/HR: 9 INJECTION, SOLUTION INTRAVENOUS at 11:35

## 2018-01-01 RX ADMIN — ALBUMIN HUMAN 12.5 G: 0.05 INJECTION, SOLUTION INTRAVENOUS at 04:36

## 2018-01-01 RX ADMIN — OXYCODONE HYDROCHLORIDE 5 MG: 5 TABLET ORAL at 11:03

## 2018-01-01 RX ADMIN — PIPERACILLIN SODIUM,TAZOBACTAM SODIUM 4.5 G: 4; .5 INJECTION, POWDER, FOR SOLUTION INTRAVENOUS at 18:04

## 2018-01-01 RX ADMIN — SODIUM CHLORIDE: 0.9 INJECTION, SOLUTION INTRAVENOUS at 05:35

## 2018-01-01 RX ADMIN — Medication 125 MG: at 11:19

## 2018-01-01 RX ADMIN — SODIUM CHLORIDE 500 ML: 9 INJECTION, SOLUTION INTRAVENOUS at 13:15

## 2018-01-01 RX ADMIN — SODIUM CHLORIDE, POTASSIUM CHLORIDE, SODIUM LACTATE AND CALCIUM CHLORIDE 500 ML: 600; 310; 30; 20 INJECTION, SOLUTION INTRAVENOUS at 10:00

## 2018-01-01 RX ADMIN — SODIUM CHLORIDE, POTASSIUM CHLORIDE, SODIUM LACTATE AND CALCIUM CHLORIDE 1000 ML: 600; 310; 30; 20 INJECTION, SOLUTION INTRAVENOUS at 11:21

## 2018-01-01 RX ADMIN — PANTOPRAZOLE SODIUM 40 MG: 40 TABLET, DELAYED RELEASE ORAL at 07:52

## 2018-01-01 RX ADMIN — SODIUM CHLORIDE 1000 ML: 9 INJECTION, SOLUTION INTRAVENOUS at 23:43

## 2018-01-01 RX ADMIN — Medication 125 MG: at 12:04

## 2018-01-01 ASSESSMENT — ENCOUNTER SYMPTOMS
EYE REDNESS: 0
FLANK PAIN: 0
DECREASED CONCENTRATION: 1
COLOR CHANGE: 0
FEVER: 0
NECK PAIN: 0
CHEST TIGHTNESS: 0
BACK PAIN: 1
TROUBLE SWALLOWING: 0
APPETITE CHANGE: 1
JOINT SWELLING: 0
BRUISES/BLEEDS EASILY: 0
WEAKNESS: 1
SEIZURES: 0
LIGHT-HEADEDNESS: 0
HALLUCINATIONS: 0
NECK STIFFNESS: 0
DIFFICULTY URINATING: 1
BLOOD IN STOOL: 1
FATIGUE: 1
SHORTNESS OF BREATH: 0
ACTIVITY CHANGE: 1
VOMITING: 0
NAUSEA: 0
COUGH: 0
CONFUSION: 0
ARTHRALGIAS: 0
ABDOMINAL PAIN: 0
SORE THROAT: 0
DYSPHORIC MOOD: 1
HEADACHES: 0

## 2018-01-01 ASSESSMENT — PAIN DESCRIPTION - DESCRIPTORS
DESCRIPTORS: ACHING

## 2018-01-01 ASSESSMENT — ACTIVITIES OF DAILY LIVING (ADL)
BATHING: 2-->ASSISTIVE PERSON
AMBULATION: 3-->ASSISTIVE EQUIPMENT AND PERSON
SWALLOWING: 0-->SWALLOWS FOODS/LIQUIDS WITHOUT DIFFICULTY
TOILETING: 3-->ASSISTIVE EQUIPMENT AND PERSON
COGNITION: 1 - ATTENTION OR MEMORY DEFICITS
RETIRED_EATING: 2-->ASSISTIVE PERSON
RETIRED_COMMUNICATION: 0-->UNDERSTANDS/COMMUNICATES WITHOUT DIFFICULTY
FALL_HISTORY_WITHIN_LAST_SIX_MONTHS: YES
DRESS: 2-->ASSISTIVE PERSON
TRANSFERRING: 3-->ASSISTIVE EQUIPMENT AND PERSON

## 2018-05-27 NOTE — IP AVS SNAPSHOT
` `           UNIT 4C [facility] BANK: [phone]            Medication Administration Report for Edy Uribe as of 05/31/18 1517   Legend:    Given Hold Not Given Due Canceled Entry Other Actions    Time Time (Time) Time  Time-Action       Inactive    Active    Linked        Medications 05/25/18 05/26/18 05/27/18 05/28/18 05/29/18 05/30/18 05/31/18    acetaminophen (TYLENOL) tablet 650 mg  Dose: 650 mg  Freq: EVERY 4 HOURS PRN Route: PO  PRN Reason: mild pain  Start: 05/28/18 0328   Admin Instructions: Maximum acetaminophen dose from all sources = 75 mg/kg/day not to exceed 4 grams/day.    Admin. Amount: 2 tablet (2 × 325 mg tablet)  Last Admin: 05/29/18 0758  Dispense Loc: [facility] ADS 4C        1336 (650 mg)-Given        0758 (650 mg)-Given            Or  acetaminophen (TYLENOL) Suppository 650 mg  Dose: 650 mg  Freq: EVERY 4 HOURS PRN Route: RE  PRN Reason: mild pain  Start: 05/28/18 0328   Admin Instructions: Maximum acetaminophen dose from all sources = 75 mg/kg/day not to exceed 4 grams/day.    Admin. Amount: 1 suppository (1 × 650 mg suppository)  Dispense Loc: [facility] ADS 4C                             Lidocaine (LIDOCARE) 4 % Patch 1-2 patch  Dose: 1-2 patch  Freq: EVERY 24 HOURS 0800 Route: TD  Start: 05/30/18 1015   Admin Instructions: Apply patch(s) to area of pain. To prevent lidocaine toxicity, patient should be patch free for 12 hrs daily. Patches may be cut to smaller size prior to removing release liner.  NEVER APPLY HEAT OVER PATCH which increases absorption and risk of local anesthetic toxicity. Do not apply over area where liposomal bupivacaine was injected for 96 hours post injection.    Admin. Amount: 1-2 patch  Last Admin: 05/30/18 1711  Dispense Loc: [facility] ADS 4C  Infused Over: 12 Hours          (1116)-Not Given       1711 (1 patch)-Given [C]        (0752)-Not Given           lidocaine (LMX4) kit  Freq: EVERY 1 HOUR PRN Route: Top  PRN Reason: pain  PRN Comment: with VAD insertion  "or accessing implanted port.  Start: 05/27/18 2302   Admin Instructions: Do NOT give if patient has a history of allergy to any local anesthetic or any \"ching\" product.   Apply 30 minutes prior to VAD insertion or port access.  MAX Dose:  2.5 g (  of 5 g tube)    Dispense Loc: [facility] ADS 4C               lidocaine 1 % 1 mL  Dose: 1 mL  Freq: EVERY 1 HOUR PRN Route: OTHER  PRN Comment: mild pain with VAD insertion or accessing implanted port  Start: 05/27/18 2302   Admin Instructions: Do NOT give if patient has a history of allergy to any local anesthetic or any \"ching\" product. MAX dose 1 mL subcutaneous OR intradermal in divided doses.    Admin. Amount: 1 mL  Dispense Loc: [facility] ADS 4C  Volume: 2 mL               lidocaine patch in PLACE  Freq: EVERY 8 HOURS Route: TD  Start: 05/30/18 1015   Admin Instructions: Chart every shift, confirming that patch is still in place on patient (no barcode scan needed). See patch order for dose information.  NEVER APPLY HEAT OVER PATCH which will increase absorption and may lead to risk of local anesthetic toxicity. Do not apply over area where liposomal bupivacaine injected for 96 hours.    Last Admin: 05/30/18 1844  Dispense Loc: [facility]          (1116)-Not Given       1844 ( )-Patch in Place        0153 ( )-Patch Removed       (0939)-Not Given       [ ] 1815           lidocaine patch REMOVAL  Freq: EVERY 24 HOURS 2000 Route: TD  Start: 05/30/18 2000   Admin Instructions: Patient should have a 12 hour patch free interval    Dispense Loc: [facility]                  [ ] 2000           naloxone (NARCAN) injection 0.1-0.4 mg  Dose: 0.1-0.4 mg  Freq: EVERY 2 MIN PRN Route: IV  PRN Reason: opioid reversal  Start: 05/28/18 0158   Admin Instructions: For respiratory rate LESS than or EQUAL to 8.  Partial reversal dose:  0.1 mg titrated q 2 minutes for Analgesia Side Effects Monitoring Sedation Level of 3 (frequently drowsy, arousable, drifts to sleep during " conversation).Full reversal dose:  0.4 mg bolus for Analgesia Side Effects Monitoring Sedation Level of 4 (somnolent, minimal or no response to stimulation).  For ordered doses up to 2mg give IVP. Give each 0.4mg over 15 seconds in emergency situations. For non-emergent situations further dilute in 9mL of NS to facilitate titration of response.    Admin. Amount: 0.1-0.4 mg = 0.25-1 mL Conc: 0.4 mg/mL  Dispense Loc: [facility] ADS 4C  Volume: 1 mL               oxyCODONE IR (ROXICODONE) tablet 5 mg  Dose: 5 mg  Freq: 2 TIMES DAILY PRN Route: PO  PRN Reason: breakthrough pain  Start: 05/31/18 0945   Admin Instructions: For breakthrough pain refractory to lidocaine patch.    Admin. Amount: 1 tablet (1 × 5 mg tablet)  Dispense Loc: [facility] ADS 4C               pantoprazole (PROTONIX) EC tablet 40 mg  Dose: 40 mg  Freq: EVERY MORNING Route: PO  Start: 05/30/18 1015   Admin Instructions: DO NOT CRUSH.    Admin. Amount: 1 tablet (1 × 40 mg tablet)  Last Admin: 05/31/18 0752  Dispense Loc: [facility] ADS 4C          1119 (40 mg)-Given        0752 (40 mg)-Given           sodium chloride (PF) 0.9% PF flush 3 mL  Dose: 3 mL  Freq: EVERY 8 HOURS Route: IK  Start: 05/27/18 2304   Admin Instructions: And Q1H PRN, to lock peripheral IV dormant line.    Admin. Amount: 3 mL  Last Admin: 05/31/18 0753  Dispense Loc: [facility] Floor Stock  Volume: 3 mL        (0201)-Not Given       0749 (3 mL)-Given       1500 (3 mL)-Given       2356 (3 mL)-Given        0759 (3 mL)-Given       1625 (3 mL)-Given        (0118)-Not Given       0811 (3 mL)-Given       1608 (3 mL)-Given               0753 (3 mL)-Given       [ ] 1600           sodium chloride (PF) 0.9% PF flush 3 mL  Dose: 3 mL  Freq: EVERY 1 HOUR PRN Route: IK  PRN Reason: line flush  PRN Comment: for peripheral IV flush post IV meds  Start: 05/27/18 2302   Admin. Amount: 3 mL  Dispense Loc: [facility] Floor Stock  Volume: 3 mL               vancomycin (VANOCIN) solution 125 mg  Dose: 125 mg  Freq: 4  TIMES DAILY Route: PO  Indications of Use: CLOSTRIDIUM DIFFICILE  Start: 18 2100   Admin. Amount: 125 mg = 2.5 mL Conc: 50 mg/mL  Last Admin: 18 1139  Dispense Loc: [facility]  Volume: 2.5 mL        2212 (125 mg)-Given        0758 (125 mg)-Given       1204 (125 mg)-Given       1617 (125 mg)-Given       2039 (125 mg)-Given        0811 (125 mg)-Given       1119 (125 mg)-Given       1608 (125 mg)-Given       1940 (125 mg)-Given        0753 (125 mg)-Given       1139 (125 mg)-Given       [ ] 1600       [ ]           Completed Medications  Medications 18         Dose: 500 mL  Freq: ONCE Route: IV  Last Dose: 500 mL (18 1315)  Start: 18 1300   End: 18 1415   Admin. Amount: 500 mL  Last Admin: 18 1315  Dispense Loc: [facility] Floor Stock  Infused Over: 1 Hours  Administrations Remainin  Volume: 500 mL         1315 (500 mL)-New Bag               Dose: 1,000 mL  Freq: ONCE Route: IV  Last Dose: 1,000 mL (18 1121)  Start: 18 1030   End: 18 1321   Admin. Amount: 1,000 mL  Last Admin: 18 1121  Dispense Loc: [facility] Floor Stock  Infused Over: 2 Hours  Administrations Remainin  Volume: 1,000 mL          1121 (1,000 mL)-New Bag              Dose: 40 mEq  Freq: ONCE Route: PO  Start: 18 1100   End: 18 1118   Admin Instructions: DO NOT CRUSH.    Admin. Amount: 4 tablet (4 × 10 mEq tablet)  Last Admin: 18 1118  Dispense Loc: [facility] ADS 4C  Administrations Remainin          1118 (40 mEq)-Given              Dose: 60 mEq  Freq: ONCE Route: PO  Start: 18 1700   End: 18 1940   Admin Instructions: DO NOT CRUSH    Admin. Amount: 3 tablet (3 × 20 mEq tablet)  Last Admin: 18  Dispense Loc: [facility]  Administrations Remainin1940 (60 mEq)-Given           Discontinued Medications  Medications 18  18         Dose: 1,000 mL  Freq: ONCE Route: IV  Start: 18   End: 18 155   Admin Instructions: Administer with pressure bag    Admin. Amount: 1,000 mL  Dispense Loc: [facility] Floor Stock  Administrations Remainin  Volume: 1,000 mL                 1552-Med Discontinued          Dose: 500 mL  Freq: ONCE Route: IV  Start: 18   End: 18   Admin. Amount: 500 mL  Dispense Loc: [facility] Floor Stock  Administrations Remainin  Volume: 500 mL                 1552-Med Discontinued          Dose: 25 g  Freq: ONCE Route: IV  Start: 18   End: 18   Admin Instructions: Infusion rates should be adjusted based on patient condition and response.  - For shock/hypovolemia, infuse as rapidly as tolerated. For patients with cardiac or circulatory disease at risk for rapid blood pressure increases, do not exceed 5-10 mL/min.   - For patients with normal plasma volume, do not exceed 1-2 mL/min to avoid circulatory overload and pulmonary edema.   - For procedure specific rates, please refer to procedure protocol.    Admin. Amount: 25 g = 100 mL Conc: 25 g/100 mL  Dispense Loc: [facility] ADS 4C  Administrations Remainin  Volume: 100 mL                 1553-Med Discontinued          Rate: 3-39.3 mL/hr Dose: 0.03-0.4 mcg/kg/min  Weight Dosing Info: 104.9 kg  Freq: CONTINUOUS Route: IV  Last Dose: Stopped (18 1800)  Start: 1845   End: 18 1936   Admin Instructions: For range orders: start at lowest dose ordered. Titrate by 0.03 to 0.1 mcg/kg/min every 5 minutes to keep MAP greater than 65 mmHg.  Notify prescriber if higher doses are required to achieve blood pressure goals.  Protect from light. Vesicant.    Admin. Amount: 3.147-41.96 mcg/min  Last Admin: 18 1700  Dispense Loc: [facility]  Volume: 250 mL   Mixture Administration Information:   Medication Type Amount   norepinephrine 1 MG/ML SOLN Medications 16 mg   D5W 5 % SOLN  QS Base 234 mL                0825 (0.03 mcg/kg/min)-New Bag       0845 (0.06 mcg/kg/min)-Rate/Dose Change       1200 (0.06 mcg/kg/min)-Rate/Dose Verify       1300 (0.06 mcg/kg/min)-Rate/Dose Verify       1400 (0.03 mcg/kg/min)-Rate/Dose Change       1500 (0.03 mcg/kg/min)-Rate/Dose Verify       1600 (0.03 mcg/kg/min)-Rate/Dose Verify       1700 (0.03 mcg/kg/min)-Rate/Dose Verify       1800-Stopped        1936-Med Discontinued           Dose: 5 mg  Freq: EVERY 4 HOURS PRN Route: PO  PRN Reason: moderate to severe pain  Start: 05/28/18 1252   End: 05/31/18 0934   Admin. Amount: 1 tablet (1 × 5 mg tablet)  Last Admin: 05/31/18 0440  Dispense Loc: [facility] ADS 4C        1336 (5 mg)-Given       2212 (5 mg)-Given        0421 (5 mg)-Given       1103 (5 mg)-Given       1614 (5 mg)-Given       2039 (5 mg)-Given        0213 (5 mg)-Given       1626 (5 mg)-Given        0006 (5 mg)-Given       0440 (5 mg)-Given       0934-Med Discontinued         Rate: 10 mL/hr Dose: 8 mg/hr  Freq: CONTINUOUS Route: IV  Last Dose: Stopped (05/30/18 1000)  Start: 05/27/18 2352   End: 05/30/18 0840   Admin Instructions: Irritant.    Last Admin: 05/30/18 0900  Dispense Loc: [facility]  Volume: 100 mL   Mixture Administration Information:   Medication Type Amount   pantoprazole 40 MG SOLR Medications 80 mg   sodium chloride 0.9 % SOLN QS Base 80 mL                0034 (8 mg/hr)-New Bag       0200 (8 mg/hr)-Rate/Dose Verify       0300 (8 mg/hr)-Rate/Dose Verify       0400 (8 mg/hr)-Rate/Dose Verify       0500 (8 mg/hr)-Rate/Dose Verify       0600 (8 mg/hr)-Rate/Dose Verify       0700 (8 mg/hr)-Rate/Dose Verify       0800 (8 mg/hr)-Rate/Dose Verify       0900 (8 mg/hr)-Rate/Dose Verify       1135 (8 mg/hr)-New Bag       1200 (8 mg/hr)-Rate/Dose Verify       1300 (8 mg/hr)-Rate/Dose Verify       1400 (8 mg/hr)-Rate/Dose Verify       1500 (8 mg/hr)-Rate/Dose Verify       1600 (8 mg/hr)-Rate/Dose Verify       1700 (8 mg/hr)-Rate/Dose  Verify       1800 (8 mg/hr)-Rate/Dose Verify       1900 (8 mg/hr)-Rate/Dose Verify       2000 (8 mg/hr)-Rate/Dose Verify       2100 (8 mg/hr)-Rate/Dose Verify       2200 (8 mg/hr)-Rate/Dose Verify       2300 (8 mg/hr)-Rate/Dose Verify        0000 (8 mg/hr)-Rate/Dose Verify       0100 (8 mg/hr)-Rate/Dose Verify       0200 (8 mg/hr)-Rate/Dose Verify       0300 (8 mg/hr)-Rate/Dose Verify       0400 (8 mg/hr)-Rate/Dose Verify       0500 (8 mg/hr)-Rate/Dose Verify       0600 (8 mg/hr)-Rate/Dose Verify       0700 (8 mg/hr)-Rate/Dose Verify       0742 (8 mg/hr)-New Bag       0800 (8 mg/hr)-Rate/Dose Verify       0900 (8 mg/hr)-Rate/Dose Verify       1000 (8 mg/hr)-Rate/Dose Verify       1100 (8 mg/hr)-Rate/Dose Verify       1200 (8 mg/hr)-Rate/Dose Verify       1300 (8 mg/hr)-Rate/Dose Verify       1400 (8 mg/hr)-Rate/Dose Verify       1500 (8 mg/hr)-Rate/Dose Verify       1600 (8 mg/hr)-Rate/Dose Verify       1700 (8 mg/hr)-Rate/Dose Verify       1800 (8 mg/hr)-Rate/Dose Verify       1900 (8 mg/hr)-Rate/Dose Verify       1901 (8 mg/hr)-New Bag       2000 (8 mg/hr)-Rate/Dose Verify       2100 (8 mg/hr)-Rate/Dose Verify       2200 (8 mg/hr)-Rate/Dose Verify       2300 (8 mg/hr)-Rate/Dose Verify        0000 (8 mg/hr)-Rate/Dose Verify       0100 (8 mg/hr)-Rate/Dose Verify       0200 (8 mg/hr)-Rate/Dose Verify       0300 (8 mg/hr)-Rate/Dose Verify       0400 (8 mg/hr)-Rate/Dose Verify       0500 (8 mg/hr)-Rate/Dose Verify       0600 (8 mg/hr)-Rate/Dose Verify       0652 (8 mg/hr)-New Bag       0700 (8 mg/hr)-Rate/Dose Verify       0800 (8 mg/hr)-Rate/Dose Verify       0840-Med Discontinued  0900 (8 mg/hr)-Rate/Dose Verify       0840-Med Discontinued  1000-Stopped              Dose: 4.5 g  Freq: EVERY 6 HOURS Route: IV  Indications of Use: SEPSIS  Last Dose: 4.5 g (05/29/18 0421)  Start: 05/28/18 0330   End: 05/29/18 1201   Admin. Amount: 4.5 g  Last Admin: 05/29/18 1102  Dispense Loc: [facility] ADS 4C  Infused Over: 30  Minutes  Volume: 20 mL        0308 (4.5 g)-New Bag       0935 (4.5 g)-New Bag       1804 (4.5 g)-New Bag       2212 (4.5 g)-New Bag        0421 (4.5 g)-New Bag       1102 (4.5 g)-New Bag       1201-Med Discontinued           Dose: 40 mEq  Freq: ONCE Route: PO  Start: 18 1015   End: 18 1055   Admin Instructions: Dissolve packet contents in 4-8 ounces of cold water or juice.    Admin. Amount: 40 mEq  Dispense Loc: [facility] ADS 4C  Administrations Remainin          1055-Med Discontinued  (1116)-Not Given              Rate: 125 mL/hr   Freq: CONTINUOUS Route: IV  Start: 18 2304   End: 18 0840   Admin Instructions: Administer after the boluses.    Last Admin: 18  Dispense Loc: [facility] Floor Stock  Volume: 50 mL        0103 ( )-New Bag       0437 ( )-New Bag        0930 ( )-New Bag       1801 ( )-New Bag        0206 ( )-New Bag       0840-Med Discontinued          Dose: 2,000 mg  Freq: EVERY 12 HOURS Route: IV  Indications of Use: SEPSIS  Last Dose: 2,000 mg (18)  Start: 18 0400   End: 18 120   Admin Instructions: For an adult with peripheral catheter and dose of 2-2.5 g, infuse over 2 hours.  Vesicant.    For peripheral catheter: If dose between 2-2.5 g, infuse over 2 hours.    For central catheter: If dose is below 2 g, dose may be infused over 1 hour.    Admin. Amount: 2,000 mg  Last Admin: 18  Dispense Loc: [facility]  Infused Over: 2 Hours  Volume: 500 mL   Mixture Administration Information:   Medication Type Amount   vancomycin 100 mg/mL SOLR Medications 2,000 mg   sodium chloride 0.9 % SOLN Base 500 mL                0432 (2,000 mg)-New Bag       1501 (2,000 mg)-New Bag        0421 (2,000 mg)-New Bag       1201-Med Discontinued      Medications 1818 05/30/18 05/31/18

## 2018-05-27 NOTE — IP AVS SNAPSHOT
"    UNIT 4C [facility]: [phone]                                              INTERAGENCY TRANSFER FORM - PHYSICIAN ORDERS   2018                    Hospital Admission Date: 2018  RODNEY CHRISTENSEN   : 1960  Sex: Male        Attending Provider: Kelley Lozano MD     Allergies:  No Known Allergies    Infection:  VRE   Service:  MEDICAL ICU    Ht:  1.885 m (6' 2.2\")   Wt:  104.8 kg (231 lb 0.7 oz)   Admission Wt:  107.7 kg (237 lb 8 oz)    BMI:  29.5 kg/m 2   BSA:  2.34 m 2            Patient PCP Information     Provider PCP Type    Physician No Ref-Primary General      ED Clinical Impression     Diagnosis Description Comment Added By Time Added    GI bleed [K92.2] GI bleed [K92.2]  Christopher Vincent MD 2018 11:51 PM    Hypotension due to blood loss [I95.89] Hypotension due to blood loss [I95.89]  Christopher Vincent MD 2018  8:42 AM    EVERTON (acute kidney injury) (H) [N17.9] EVERTON (acute kidney injury) (H) [N17.9]  Christopher Vincent MD 2018  8:43 AM    Malignant neoplasm of urinary bladder, unspecified site (H) [C67.9] Malignant neoplasm of urinary bladder, unspecified site (H) [C67.9]  Christopher Vincent MD 2018  8:43 AM    Hematuria, gross [R31.0] Hematuria, gross [R31.0]  Christopher Vincent MD 2018  8:44 AM    Nephrostomy tube bleed (H) [T83.83XA] Nephrostomy tube bleed (H) [T83.83XA]  Christopher Vincent MD 2018  8:44 AM    Dehydration [E86.0] Dehydration [E86.0]  Christopher Vincent MD 2018  8:44 AM      Hospital Problems as of 2018              Priority Class Noted POA    GIB (gastrointestinal bleeding) Medium  2018 Yes      Non-Hospital Problems as of 2018     None      Code Status History     Date Active Date Inactive Code Status Order ID Comments User Context    2018 11:33 AM  DNR/DNI [id number]  Lakesha Villalobos MD Outpatient    2018  3:52 AM 2018 11:33 AM DNR/DNI [id number]  Ludy Bower MD Inpatient "         Medication Review      START taking        Dose / Directions Comments    pantoprazole 40 MG EC tablet   Commonly known as:  PROTONIX   Used for:  Gastroesophageal reflux disease, esophagitis presence not specified        Dose:  40 mg   Start taking on:  6/1/2018   Take 1 tablet (40 mg) by mouth every morning   Quantity:  30 tablet   Refills:  0        * vancomycin 50 mg/mL Liqd solution   Commonly known as:  VANOCIN   Indication:  Clostridium difficile Bacteria   Used for:  C. difficile colitis        Dose:  125 mg   Take 2.5 mLs (125 mg) by mouth 4 times daily for 11 days   Quantity:  110 mL   Refills:  0        * vancomycin 125 MG capsule   Commonly known as:  VANCOCIN HCL   Used for:  C. difficile colitis        Dose:  125 mg   Start taking on:  6/12/2018   Take 1 capsule (125 mg) by mouth daily   Refills:  0        * Notice:  This list has 2 medication(s) that are the same as other medications prescribed for you. Read the directions carefully, and ask your doctor or other care provider to review them with you.      CONTINUE these medications which have NOT CHANGED        Dose / Directions Comments    ascorbic acid 500 MG Cpcr CR capsule   Commonly known as:  vitamin C        Dose:  500 mg   Take 500 mg by mouth daily   Refills:  0        COMPAZINE PO        Dose:  10 mg   Take 10 mg by mouth every 6 hours as needed for nausea   Refills:  0        ferrous sulfate 325 (65 Fe) MG tablet   Commonly known as:  IRON        Take by mouth daily (with breakfast)   Refills:  0        lidocaine 5 % Patch   Commonly known as:  LIDODERM        Dose:  1 patch   Place 1 patch onto the skin every 24 hours   Refills:  0        MAGNESIUM OXIDE PO        Dose:  400 mg   Take 400 mg by mouth daily   Refills:  0        METOPROLOL TARTRATE PO        Dose:  12.5 mg   Take 12.5 mg by mouth 2 times daily   Refills:  0        OXYCODONE HCL PO   Indication:  Chronic Pain        Dose:  5 mg   Take 5 mg by mouth every 3 hours as  needed   Refills:  0        TYLENOL PO        Dose:  500 mg   Take 500 mg by mouth every 8 hours as needed for mild pain or fever   Refills:  0                Summary of Visit     Reason for your hospital stay       You were hospitalized due to bleeding from your nephrostomy tube and colostomy. This is likely due to your cancer and C diff infection. You improved after transfusion and your blood count remained stable.             After Care     Activity - Up with nursing assistance           Advance Diet as Tolerated       Follow this diet upon discharge: Orders Placed This Encounter      Regular Diet Adult       Daily weights       Call Provider for weight gain of more than 2 pounds per day or 5 pounds per week.       Fall precautions           General info for SNF       Length of Stay Estimate: Short Term Care: Estimated # of Days <30  Condition at Discharge: Improving  Level of care:skilled   Rehabilitation Potential: Fair  Admission H&P remains valid and up-to-date: Yes  Recent Chemotherapy: N/A  Use Nursing Home Standing Orders: Yes       Mantoux instructions       Give two-step Mantoux (PPD) Per Facility Policy Yes       Ostomy care       Standard Cares.  Type:  colostomy             Referrals     Occupational Therapy Adult Consult       Evaluate and treat as clinically indicated.    Reason:  deconditioning       Physical Therapy Adult Consult       Evaluate and treat as clinically indicated.    Reason:  deconditioning       SLEEP EVALUATION & MANAGEMENT REFERRAL - ADULT -Other (Respond in commments) (Per patient's request)       Please be aware that coverage of these services is subject to the terms and limitations of your health insurance plan.  Call member services at your health plan with any benefit or coverage questions.      Please bring the following to your appointment:    >>   List of current medications   >>   This referral request   >>   Any documents/labs given to you for this referral                  Other Orders     Contact Isolation                 Supplies     Pneumatic Compression Device        Bilateral calf. Remove 30 mins BID.             Follow-Up Appointment Instructions     Future Labs/Procedures    Follow Up and recommended labs and tests     Comments:    Follow up with oncologist in [place] as previously arranged.   Patient needs a CBC and a BMP on 6/4 to f/up on electrolytes and hemoglobin.   Sleep study referral also placed.  Follow-up with  at [facility] to continue patient's request to be moved to a TCU closer to [place].      Follow-Up Appointment Instructions     Follow Up and recommended labs and tests       Follow up with oncologist in [place] as previously arranged.   Patient needs a CBC and a BMP on 6/4 to f/up on electrolytes and hemoglobin.   Sleep study referral also placed.  Follow-up with  at [facility] to continue patient's request to be moved to a TCU closer to [place].             Statement of Approval     Ordered          05/31/18 1211  I have reviewed and agree with all the recommendations and orders detailed in this document.  EFFECTIVE NOW     Approved and electronically signed by:  Lakesha Villalobos MD

## 2018-05-27 NOTE — IP AVS SNAPSHOT
[address] [address]    Phone:  [phone]                                       After Visit Summary   5/27/2018    Edy Uribe    MRN: [id number]           After Visit Summary Signature Page     I have received my discharge instructions, and my questions have been answered. I have discussed any challenges I see with this plan with the nurse or doctor.    ..........................................................................................................................................  Patient/Patient Representative Signature      ..........................................................................................................................................  Patient Representative Print Name and Relationship to Patient    ..................................................               ................................................  Date                                            Time    ..........................................................................................................................................  Reviewed by Signature/Title    ...................................................              ..............................................  Date                                                            Time

## 2018-05-27 NOTE — IP AVS SNAPSHOT
MRN:[id number]                      After Visit Summary   5/27/2018    Edy Uribe    MRN: [id number]           Thank you!     Thank you for choosing [facility] for your care. Our goal is always to provide you with excellent care. Hearing back from our patients is one way we can continue to improve our services. Please take a few minutes to complete the written survey that you may receive in the mail after you visit with us. Thank you!        Patient Information     Date Of Birth          1960        Designated Caregiver       Most Recent Value    Caregiver    Will someone help with your care after discharge? yes    Name of designated caregiver nursing home    Phone number of caregiver nursing home    Caregiver address see chart      About your hospital stay     You were admitted on:  May 28, 2018 You last received care in the:  [address] [facility]    You were discharged on:  May 31, 2018        Reason for your hospital stay       You were hospitalized due to bleeding from your nephrostomy tube and colostomy. This is likely due to your cancer and C diff infection. You improved after transfusion and your blood count remained stable.                  Who to Call     For medical emergencies, please call 911.  For non-urgent questions about your medical care, please call your primary care provider or clinic, None          Attending Provider     Provider Specialty    Christopher Vincent MD Emergency Medicine    [place], Cecil Rico MD Emergency Medicine    Edison Baltazar MD Anesthesiology    Sit, MD [facility]       Primary Care Provider Fax #    Physician No Ref-Primary [phone]      After Care Instructions     Activity - Up with nursing assistance           Advance Diet as Tolerated       Follow this diet upon discharge: Orders Placed This Encounter      Regular Diet Adult            Daily weights       Call Provider for weight gain of more than 2 pounds per day or 5  pounds per week.            Fall precautions           General info for SNF       Length of Stay Estimate: Short Term Care: Estimated # of Days <30  Condition at Discharge: Improving  Level of care:skilled   Rehabilitation Potential: Fair  Admission H&P remains valid and up-to-date: Yes  Recent Chemotherapy: N/A  Use Nursing Home Standing Orders: Yes            Mantoux instructions       Give two-step Mantoux (PPD) Per Facility Policy Yes            Ostomy care       Standard Cares.  Type:  colostomy                  Follow-up Appointments     Follow Up and recommended labs and tests       Follow up with oncologist in [place] as previously arranged.   Patient needs a CBC and a BMP on 6/4 to f/up on electrolytes and hemoglobin.   Sleep study referral also placed.  Follow-up with  at [facility] to continue patient's request to be moved to a TCU closer to [place].                  Additional Services     Occupational Therapy Adult Consult       Evaluate and treat as clinically indicated.    Reason:  deconditioning            Physical Therapy Adult Consult       Evaluate and treat as clinically indicated.    Reason:  deconditioning            SLEEP EVALUATION & MANAGEMENT REFERRAL - ADULT -Other (Respond in commments) (Per patient's request)       Please be aware that coverage of these services is subject to the terms and limitations of your health insurance plan.  Call member services at your health plan with any benefit or coverage questions.      Please bring the following to your appointment:    >>   List of current medications   >>   This referral request   >>   Any documents/labs given to you for this referral                      Future tests that were ordered for you     Contact Isolation           Pneumatic Compression Device        Bilateral calf. Remove 30 mins BID.                  Pending Results     Date and Time Order Name Status Description    5/28/2018 0735 Blood culture Preliminary   "   5/28/2018 0734 Transfusion reaction evaluation In process     5/28/2018 0445 Blood culture Preliminary     5/28/2018 0338 Transfusion reaction evaluation In process     5/28/2018 0208 Blood culture Preliminary     5/28/2018 0208 Blood culture Preliminary             Statement of Approval     Ordered          05/31/18 1211  I have reviewed and agree with all the recommendations and orders detailed in this document.  EFFECTIVE NOW     Approved and electronically signed by:  Lakesha Villalobos MD             Admission Information     Date & Time Provider Department Dept. Phone    5/27/2018 Kelley Lozano MD [address] [facility] [place] [phone]      Your Vitals Were     Blood Pressure Pulse Temperature Respirations Height Weight    126/78 114 97.9  F (36.6  C) (Oral) 14 1.885 m (6' 2.2\") 104.8 kg (231 lb 0.7 oz)    Pulse Oximetry BMI (Body Mass Index)                100% 29.5 kg/m2          Care EveryWhere ID     This is your Care EveryWhere ID. This could be used by other organizations to access your [place] medical records  [id number]        Equal Access to Services     LINNEA KNIGHT AH: Hadii deedee Hwang, waaxda luqadaha, qaybta kaalmadoc conroy, adele downing . So [facility] [phone].    ATENCIÓN: Si habla español, tiene a mcgrath disposición servicios gratuitos de asistencia lingüística. Llame al [phone].    We comply with applicable federal civil rights laws and [place] laws. We do not discriminate on the basis of race, color, national origin, age, disability, sex, sexual orientation, or gender identity.               Review of your medicines      START taking        Dose / Directions    pantoprazole 40 MG EC tablet   Commonly known as:  PROTONIX   Used for:  Gastroesophageal reflux disease, esophagitis presence not specified        Dose:  40 mg   Start taking on:  6/1/2018   Take 1 tablet (40 mg) by mouth every morning   Quantity:  30 tablet   Refills:  0       * " vancomycin 50 mg/mL Liqd solution   Commonly known as:  VANOCIN   Indication:  Clostridium difficile Bacteria   Used for:  C. difficile colitis        Dose:  125 mg   Take 2.5 mLs (125 mg) by mouth 4 times daily for 11 days   Quantity:  110 mL   Refills:  0       * vancomycin 125 MG capsule   Commonly known as:  VANCOCIN HCL   Used for:  C. difficile colitis        Dose:  125 mg   Start taking on:  6/12/2018   Take 1 capsule (125 mg) by mouth daily   Refills:  0       * Notice:  This list has 2 medication(s) that are the same as other medications prescribed for you. Read the directions carefully, and ask your doctor or other care provider to review them with you.      CONTINUE these medicines which have NOT CHANGED        Dose / Directions    ascorbic acid 500 MG Cpcr CR capsule   Commonly known as:  vitamin C        Dose:  500 mg   Take 500 mg by mouth daily   Refills:  0       COMPAZINE PO        Dose:  10 mg   Take 10 mg by mouth every 6 hours as needed for nausea   Refills:  0       ferrous sulfate 325 (65 Fe) MG tablet   Commonly known as:  IRON        Take by mouth daily (with breakfast)   Refills:  0       lidocaine 5 % Patch   Commonly known as:  LIDODERM        Dose:  1 patch   Place 1 patch onto the skin every 24 hours   Refills:  0       MAGNESIUM OXIDE PO        Dose:  400 mg   Take 400 mg by mouth daily   Refills:  0       METOPROLOL TARTRATE PO        Dose:  12.5 mg   Take 12.5 mg by mouth 2 times daily   Refills:  0       OXYCODONE HCL PO   Indication:  Chronic Pain        Dose:  5 mg   Take 5 mg by mouth every 3 hours as needed   Refills:  0       TYLENOL PO        Dose:  500 mg   Take 500 mg by mouth every 8 hours as needed for mild pain or fever   Refills:  0            Where to get your medicines      Some of these will need a paper prescription and others can be bought over the counter. Ask your nurse if you have questions.     You don't need a prescription for these medications     pantoprazole  40 MG EC tablet    vancomycin 125 MG capsule    vancomycin 50 mg/mL Liqd solution                Protect others around you: Learn how to safely use, store and throw away your medicines at www.disposemymeds.org.        ANTIBIOTIC INSTRUCTION     You've Been Prescribed an Antibiotic - Now What?  Your healthcare team thinks that you or your loved one might have an infection. Some infections can be treated with antibiotics, which are powerful, life-saving drugs. Like all medications, antibiotics have side effects and should only be used when necessary. There are some important things you should know about your antibiotic treatment.      Your healthcare team may run tests before you start taking an antibiotic.    Your team may take samples (e.g., from your blood, urine or other areas) to run tests to look for bacteria. These test can be important to determine if you need an antibiotic at all and, if you do, which antibiotic will work best.      Within a few days, your healthcare team might change or even stop your antibiotic.    Your team may start you on an antibiotic while they are working to find out what is making you sick.    Your team might change your antibiotic because test results show that a different antibiotic would be better to treat your infection.    In some cases, once your team has more information, they learn that you do not need an antibiotic at all. They may find out that you don't have an infection, or that the antibiotic you're taking won't work against your infection. For example, an infection caused by a virus can't be treated with antibiotics. Staying on an antibiotic when you don't need it is more likely to be harmful than helpful.      You may experience side effects from your antibiotic.    Like all medications, antibiotics have side effects. Some of these can be serious.    Let you healthcare team know if you have any known allergies when you are admitted to the hospital.    One significant  side effect of nearly all antibiotics is the risk of severe and sometimes deadly diarrhea caused by Clostridium difficile (C. Difficile). This occurs when a person takes antibiotics because some good germs are destroyed. Antibiotic use allows C. diificile to take over, putting patients at high risk for this serious infection.    As a patient or caregiver, it is important to understand your or your loved one's antibiotic treatment. It is especially important for caregivers to speak up when patients can't speak for themselves. Here are some important questions to ask your healthcare team.    What infection is this antibiotic treating and how do you know I have that infection?    What side effects might occur from this antibiotic?    How long will I need to take this antibiotic?    Is it safe to take this antibiotic with other medications or supplements (e.g., vitamins) that I am taking?     Are there any special directions I need to know about taking this antibiotic? For example, should I take it with food?    How will I be monitored to know whether my infection is responding to the antibiotic?    What tests may help to make sure the right antibiotic is prescribed for me?      Information provided by:  www.cdc.gov/getsmart  U.S. Department of Health and Human Services  Centers for disease Control and Prevention  National Center for Emerging and Zoonotic Infectious Diseases  Division of Healthcare Quality Promotion        Information about OPIOIDS     PRESCRIPTION OPIOIDS: WHAT YOU NEED TO KNOW   You have a prescription for an opioid (narcotic) pain medicine. Opioids can cause addiction. If you have a history of chemical dependency of any type, you are at a higher risk of becoming addicted to opioids. Only take this medicine after all other options have been tried. Take it for as short a time and as few doses as possible.     Do not:    Drive. If you drive while taking these medicines, you could be arrested for driving  under the influence (DUI).    Operate heavy machinery    Do any other dangerous activities while taking these medicines.     Drink any alcohol while taking these medicines.      Take with any other medicines that contain acetaminophen. Read all labels carefully. Look for the word  acetaminophen  or  Tylenol.  Ask your pharmacist if you have questions or are unsure.    Store your pills in a secure place, locked if possible. We will not replace any lost or stolen medicine. If you don t finish your medicine, please throw away (dispose) as directed by your pharmacist. The [place] Pollution Control Agency has more information about safe disposal: https://www.pca.[facility].[place].us/living-green/managing-unwanted-medications    All opioids tend to cause constipation. Drink plenty of water and eat foods that have a lot of fiber, such as fruits, vegetables, prune juice, apple juice and high-fiber cereal. Take a laxative (Miralax, milk of magnesia, Colace, Senna) if you don t move your bowels at least every other day.              Medication List: This is a list of all your medications and when to take them. Check marks below indicate your daily home schedule. Keep this list as a reference.      Medications           Morning Afternoon Evening Bedtime As Needed    ascorbic acid 500 MG Cpcr CR capsule   Commonly known as:  vitamin C   Take 500 mg by mouth daily                                COMPAZINE PO   Take 10 mg by mouth every 6 hours as needed for nausea                                ferrous sulfate 325 (65 Fe) MG tablet   Commonly known as:  IRON   Take by mouth daily (with breakfast)                                lidocaine 5 % Patch   Commonly known as:  LIDODERM   Place 1 patch onto the skin every 24 hours                                MAGNESIUM OXIDE PO   Take 400 mg by mouth daily                                METOPROLOL TARTRATE PO   Take 12.5 mg by mouth 2 times daily                                OXYCODONE HCL PO    Take 5 mg by mouth every 3 hours as needed   Last time this was given:  5 mg on 5/31/2018  4:40 AM                                pantoprazole 40 MG EC tablet   Commonly known as:  PROTONIX   Take 1 tablet (40 mg) by mouth every morning   Start taking on:  6/1/2018   Last time this was given:  40 mg on 5/31/2018  7:52 AM                                TYLENOL PO   Take 500 mg by mouth every 8 hours as needed for mild pain or fever   Last time this was given:  650 mg on 5/29/2018  7:58 AM                                * vancomycin 50 mg/mL Liqd solution   Commonly known as:  VANOCIN   Take 2.5 mLs (125 mg) by mouth 4 times daily for 11 days   Last time this was given:  125 mg on 5/31/2018 11:39 AM                                * vancomycin 125 MG capsule   Commonly known as:  VANCOCIN HCL   Take 1 capsule (125 mg) by mouth daily   Start taking on:  6/12/2018                                * Notice:  This list has 2 medication(s) that are the same as other medications prescribed for you. Read the directions carefully, and ask your doctor or other care provider to review them with you.

## 2018-05-27 NOTE — IP AVS SNAPSHOT
` `     UNIT 4C [facility]: [phone]                 INTERAGENCY TRANSFER FORM - NOTES (H&P, Discharge Summary, Consults, Procedures, Therapies)   2018                    Hospital Admission Date: 2018  EDY CHRISTENSEN   : 1960  Sex: Male        Patient PCP Information     Provider PCP Type    Physician No Ref-Primary General         History & Physicals      H&P by Ludy Bower MD at 2018  1:04 AM     Author:  Ludy Bower MD Service:  Medical ICU Author Type:  Resident    Filed:  2018  4:53 AM Date of Service:  2018  1:04 AM Creation Time:  2018  1:04 AM    Status:  Attested :  Ludy Bower MD (Resident)    Cosigner:  Edison Baltazar MD at 2018  5:02 AM        Attestation signed by Edison Baltazar MD at 2018  5:02 AM        Attestation:  Critical Care Services Progress Note:     Edy Christensen remains critically ill with acute on chronic anemia, bladder CA (POA)     I personally examined and evaluated the patient today.   The patient s prognosis today is critical  I have evaluated all laboratory values and imaging studies from the past 24 hours.  Key findings and decisions made today included lethargic but arousable firm suprapubic mass  I personally managed the transfusion for anemia, fluid bolus for hypotension  Consults ongoing and ordered are GI and urology  Procedures that will happen today are: possible arterial line   All treatments were placed at my direction.  I formulated today s plan with the house staff team or resident(s) and agree with the findings and plan in the associated note.       The above plans and care have been discussed with sister and all questions and concerns were addressed.  Patient made DNR/DNI this am.  Based on results of the consults, would consider palliative care consult given bladder CA and ongoing bleeding     I spent a total of 33 minutes (excluding procedure time) personally providing and  directing critical care services at the bedside and on the critical care unit for Edy Uribe.        Edison Carrascould                                   [facility]      MICU History and Physical  Edy Uribe MRN: [id number]  1960  Date of Admission:5/27/2018  Primary care provider: No Ref-Primary, Physician      Assessment and Plan:[SP1.1]     Edy Uribe is a 57 year old male with a history of stage IV bladder cancer complicated by obstructive uropathy s/p bilateral nephrostomy tubes, rectal obstruction s/p diverting ostomy who presented for blood coming from his left nephrostomy tube from nursing home.[SP1.2]     PLAN:  ===NEURO===[SP1.1]  # AMS  Patient protecting his airway, ABG without hypercarbia. Likely secondary to shock and will resuscitate per below. He does not have focal neuro deficits.   - Monitor[SP1.2]     ===CARDIOVASCULAR===[SP1.1]  # Shock secondary to hemorrhage, sepsis  - Resuscitation with blood, fluids overnight   - Vancomycin, zosyn   - GI aware, will see in AM  - Urology aware, will see in AM[SP1.2]    ===PULMONARY===  # SAMUEL[SP1.1]  Patient not on CPAP pta per family.[SP1.2]     ===GASTROINTESTINAL===  #LGIB[SP1.1]   - Transfuse >7  - Q4H Hgb  - Protonix gtt[SP1.2]    # Recent history of clostridium difficile colitis  - Enteric precautions  - C diff testing given reported diarrhea    ===RENAL===  # Chronic kidney disease related to obstructive uropathy  Baseline creatinine is 0.6-0.9. Suspect worsening in the setting of anemia and shock.[SP1.1]  - Strict I/Os  - Blood, fluids per above[SP1.2]    ===HEME/ONC===  # Acute on chronic anemia with hemorrhagic shock  Baseline hemoglobins 6-8 at recent admission in [place], 7.5 at discharge. He reports bloody output from his nephrostomy as well as from his ostomy in the past several days.   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 Monitor for hypoglycemia in the setting on NPO status.     ===INFECTIOUS DISEASE===  #[SP1.1] Sepsis  - Blood cultures  - Urine culture  - Procalcitonin   - Vanco/zosyn.[SP1.2]     ===SKIN/MSK===  # L5/S1 osteomyelitis vs tumor invasion[SP1.1]  - Monitor[SP1.2]    Prophylaxis:  DVT: SCDs given bleeding   GI: Pantoprazole gtt  Family:  Updated[SP1.1] sister Yesy over the phone[SP1.2]  Disposition: Critically Ill    Code Status:[SP1.1] DNR/DNI, discussed with family Yesy[SP1.2]    Patient was seen and discussed with attending physician Dr. Baltazar, who agrees with above assessment and plan.    Ludy Bower  Internal Medicine PGY-2  [id number]         Chief Complaint:   Blood in ostomy         History of Present Illness:[SP1.1]   Edy Uribe is a 57 year old male with a history of stage IV bladder cancer complicated by obstructive uropathy s/p bilateral nephrostomy tubes, rectal obstruction s/p diverting ostomy who presented for blood coming from his left nephrostomy tube from nursing home.     Upon EMS arrival, patient was hypotensive with blood pressures about 84/52. He had a blood glucose of 155 and heart rate of 120 bpm per EMS. While en route to the ED he was given 600 units of fluid. EMS also note that patient was given oxycodone by nursing home staff just prior to leaving around 22:30 for his chronic back pain.     History from patient is limited from patient due to somnolence. He denies pain in chest, abdomen. He reports chronic back pain which is unchanged from baseline. He denies difficulty breathing. He reports the bleeding from ostomy and nephrostomy have been occurring for the past several days.[SP1.2]          Review of Systems:   10 point ROS negative other than that noted in HPI.           Past Medical History:   Medical History reviewed.     1. 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 Rectal obstruction related to bladder mass and intrinsic compression, status post diverting colostomy  4. Chronic anemia  5. L5/S1 osteomyelitis vs tumor invasion  6. Recent history of clostridium difficile colitis  7. SAMUEL         Past Surgical History:   Surgical History reviewed.     1. Bilateral nephrostomy tubes   2. TURP for prostate in 2017   3. Diverting colostomy for rectal obstruction 2/2 bladder mass 2/1/18         Social History:   Social History reviewed. Patient worked in the entertainment business.     Tobacco: Never smoker  Alcohol: No         Family History:   Family History reviewed. Positive congenital heart defect in late brother, parkinsonism in father.          Allergies:   No Known Allergies          Medications:   Medications Reviewed.   Current Outpatient Prescriptions   Medication Sig     Acetaminophen (TYLENOL PO) Take 500 mg by mouth every 8 hours as needed for mild pain or fever     ascorbic acid (VITAMIN C) 500 MG CPCR CR capsule Take 500 mg by mouth daily     ferrous sulfate (IRON) 325 (65 Fe) MG tablet Take by mouth daily (with breakfast)     lidocaine (LIDODERM) 5 % Patch Place 1 patch onto the skin every 24 hours     MAGNESIUM OXIDE PO Take 400 mg by mouth daily     METOPROLOL TARTRATE PO Take 12.5 mg by mouth 2 times daily     OXYCODONE HCL PO Take 5 mg by mouth every 3 hours as needed     Prochlorperazine Maleate (COMPAZINE PO) Take 10 mg by mouth every 6 hours as needed for nausea           Physical Exam:   Vitals were reviewed.  Blood pressure 91/53, pulse 114, temperature 99.4  F (37.4  C), temperature source Oral, resp. rate 23, weight 107.7 kg (237 lb 8 oz), SpO2 94 %.    General: Oriented to person, time, not place ('[place]'), NAD  Skin: Not jaundiced, no rash,  no ecchymoses  HEENT: MMM, PERRLA, EOM intact  CV: RRR, normal S1S2, no murmur, clicks, rubs  Resp: Clear to auscultation bilaterally, no wheezes, rhonchi  Abd: Large, firm suprapubic mass involving both lower quadrants. Colostomy in LLQ with some (~10 mL) maroon output. Bilateral nephrostomy tubes, right draining clear urine and left with hematuria. Non-tender abdomen. Soft in upper quadrants. +BS.   Extremities: Pale. Warm, palpable pulses, no edema  Neuro: No lateralizing symptoms or focal neurologic deficits         Data:        ROUTINE LABS (Last four results)  CMP  Recent Labs  Lab 05/27/18  2320      POTASSIUM 4.5   CHLORIDE 102   CO2 22   ANIONGAP 10   *   BUN 47*   CR 1.72*   GFRESTIMATED 41*   GFRESTBLACK 49*   KATHI 8.5   MAG 2.5*   PROTTOTAL 5.9*   ALBUMIN 1.5*   BILITOTAL 0.4   ALKPHOS 159*   AST 54*   ALT 27     CBC  Recent Labs  Lab 05/27/18 2320   WBC 13.5*   RBC 2.28*   HGB 5.5*   HCT 18.8*   MCV 83   MCH 24.1*   MCHC 29.3*   RDW 17.1*        INR  Recent Labs  Lab 05/27/18  2320   INR 1.27*     Arterial Blood GasNo lab results found in last 7 days.[SP1.1]           Revision History        User Key Date/Time User Provider Type Action    > SP1.2 5/28/2018  4:53 AM Ludy Bower MD Resident Sign     SP1.1 5/28/2018  1:04 AM Ludy Bower MD Resident                      Discharge Summaries      Discharge Summaries by Lakesha Villalobos MD at 5/31/2018 12:14 PM     Author:  Lakesha Villalobos MD Service:  General Medicine Author Type:  Physician    Filed:  5/31/2018 12:32 PM Date of Service:  5/31/2018 12:14 PM Creation Time:  5/31/2018 12:13 PM    Status:  Cosign Needed :  Lakesha Villalobos MD (Physician)    Cosign Required:  Yes             [facility] Discharge Summary    Edy Uribe MRN# [id number]   Age: 57 year old YOB: 1960     Date of Admission:  5/27/2018  Date of  Discharge::[RP1.1]  5/31/2018[RP1.2]  Admitting Physician:  Edison Baltazar MD  Discharge Physician:[RP1.1]  Lakesha Villalobos MD[RP1.2]             Admission Diagnoses:[RP1.1]   GI bleed [K92.2][RP1.2]          Discharge Diagnosis:   Hemorrhagic shock, resolved  Stage IV bladder cancer  Recurrent C diff colitis  Hypernatremia  EVERTON on CKD, resolved  Nighttime hypoxia          Procedures:   None          Medications Prior to Admission:[RP1.1]     Prescriptions Prior to Admission   Medication Sig Dispense Refill Last Dose     Acetaminophen (TYLENOL PO) Take 500 mg by mouth every 8 hours as needed for mild pain or fever        ascorbic acid (VITAMIN C) 500 MG CPCR CR capsule Take 500 mg by mouth daily        ferrous sulfate (IRON) 325 (65 Fe) MG tablet Take by mouth daily (with breakfast)        lidocaine (LIDODERM) 5 % Patch Place 1 patch onto the skin every 24 hours        MAGNESIUM OXIDE PO Take 400 mg by mouth daily        METOPROLOL TARTRATE PO Take 12.5 mg by mouth 2 times daily        OXYCODONE HCL PO Take 5 mg by mouth every 3 hours as needed        Prochlorperazine Maleate (COMPAZINE PO) Take 10 mg by mouth every 6 hours as needed for nausea[RP1.2]                Discharge Medications:[RP1.1]     Current Discharge Medication List      START taking these medications    Details   pantoprazole (PROTONIX) 40 MG EC tablet Take 1 tablet (40 mg) by mouth every morning  Qty: 30 tablet    Associated Diagnoses: Gastroesophageal reflux disease, esophagitis presence not specified      vancomycin (VANCOCIN HCL) 125 MG capsule Take 1 capsule (125 mg) by mouth daily    Associated Diagnoses: C. difficile colitis      vancomycin (VANOCIN) 50 mg/mL LIQD solution Take 2.5 mLs (125 mg) by mouth 4 times daily for 11 days  Qty: 110 mL, Refills: 0    Associated Diagnoses: C. difficile colitis         CONTINUE these medications which have NOT CHANGED    Details   Acetaminophen (TYLENOL PO) Take 500 mg by mouth every 8 hours as  needed for mild pain or fever      ascorbic acid (VITAMIN C) 500 MG CPCR CR capsule Take 500 mg by mouth daily      ferrous sulfate (IRON) 325 (65 Fe) MG tablet Take by mouth daily (with breakfast)      lidocaine (LIDODERM) 5 % Patch Place 1 patch onto the skin every 24 hours      MAGNESIUM OXIDE PO Take 400 mg by mouth daily      METOPROLOL TARTRATE PO Take 12.5 mg by mouth 2 times daily      OXYCODONE HCL PO Take 5 mg by mouth every 3 hours as needed      Prochlorperazine Maleate (COMPAZINE PO) Take 10 mg by mouth every 6 hours as needed for nausea[RP1.2]                   Consultations:     Urology  Gastroenterology  Palliative care          Brief History of Illness:     Edy Uribe is a 57 year old male with a history of stage IV bladder cancer complicated by obstructive uropathy s/p bilateral nephrostomy tubes, rectal obstruction s/p diverting ostomy who presented for blood coming from his left nephrostomy tube from nursing home.      Upon EMS arrival, patient was hypotensive with blood pressures about 84/52. He had a blood glucose of 155 and heart rate of 120 bpm per EMS. While en route to the ED he was given 600 units of fluid. EMS also note that patient was given oxycodone by nursing home staff just prior to leaving around 22:30 for his chronic back pain.      History from patient is limited from patient due to somnolence. He denies pain in chest, abdomen. He reports chronic back pain which is unchanged from baseline. He denies difficulty breathing. He reports the bleeding from ostomy and nephrostomy have been occurring for the past several days.           Hospital Course:     # Hemorrhagic shock, resolved:  Presented with hemoglobin of 5.5, responded appropriately to transfusions. Due to blood loss from both L nephrostomy tube as well as colostomy. Bleeding spontaneously stabilized from L nephrostomy tube prior to arrival. Thought to be due to tumor invasion of local vessels. Urology evaluated  patient and did not believe any interventions were needed. Diarrhea and maroon colored stools in colostomy prompted C diff check which was positive. Gastroenterology evaluated patient and decided to do endoscopy if patient had on-going bleeding and hemoglobin drops but his hemoglobin has been stable. Continues to have some maroon colored stools, but has eaten lots of red-dye containing products (popsickles, sorbet, etc). Reassuring that his hemoglobin has been improving.   - Recheck CBC 6/4 at TCU    # Recurrent C diff colitis: Likely causing GI blood loss as above. Cont PO vancomycin QID to complete two week course, then will continue it daily until no longer receiving chemotherapy or antibiotics per GI.     # Hypernatremia  To 147 on 5/30. Had been receiving normal saline due to NPO status and had been losing free water via diarrhea. Improved with encouragement of oral free water intake.       # Stage IV bladder cancer: Patient wants to continue to be treated aggressively per palliative care discussion but understands that he may succumb to this cancer soon and is at peace with this. Ultimately wants to return to [facility] for on-going cancer treatment but needs to complete rehabilitation first. Prognosis remains guarded.   - Cont rehabilitation as able at the TCU  - Follow-up with oncology as previously arranged      # EVERTON on CKD: Presented with Cr of 1.7, now back to baseline of 1. Likely due to ischemia in setting of above shock.      # Nighttime hypoxia  With witnessed apneic episodes, CPAP provided but patient declined. Patient will need sleep eval after discharge.     Day of discharge exam:[RP1.1]   Temp: 97.9  F (36.6  C) Temp src: Oral BP: 105/70   Heart Rate: 106 Resp: 14 SpO2: 97 % O2 Device: None (Room air)[RP1.3]   General: Chronically ill and anasarcic appearing man in no apparent distress  HEENT: Anicteric conjunctiva  Lungs: CTA b/l  CV: Mildly tachycardic but RRR  Abdomen: Soft, nontender.  Large suprapubic mass in the size of a grapefruit that is hard, nontender.  Extremities: Warm, 3+ pitting edema up to abdomen          Discharge Instructions and Follow-Up:   Discharge diet: Regular   Discharge activity: Activity as tolerated   Discharge follow-up: Follow up with oncology as previously arranged  CBC/BMP on 6/4/18           Discharge Disposition:   Discharged to short-term care facility      Patient discussed with attending, Dr. Lozano, on the day of discharge.     Lakesha Villalobos DO   PGY-3 [phone][RP1.1]        Revision History        User Key Date/Time User Provider Type Action    > RP1.3 5/31/2018 12:32 PM Lakesha Villalobos MD Physician Sign     RP1.2 5/31/2018 12:14 PM Lakesha Villalobos MD Physician      RP1.1 5/31/2018 12:13 PM Lakesha Villalobos MD Physician                      Consult Notes      Consults by Adriana Spence MD at 5/29/2018 11:50 AM     Author:  Adriana Spence MD Service:  Pathology Author Type:  Resident    Filed:  5/29/2018  2:31 PM Date of Service:  5/29/2018 11:50 AM Creation Time:  5/29/2018 11:49 AM    Status:  Attested :  Adriana Spence MD (Resident)    Cosigner:  Richardson Medina MD at 5/31/2018  1:11 PM        Attestation signed by Richardson Medina MD at 5/31/2018  1:11 PM        Attestation:  I have reviewed, discussed, & agree with the Resident's Note. No evidence of Bacterial contamination so far as Gram Stain of unit is negative and if cultures on unit become positive we will notify the team    Richardson Medina MD   Division of Transfusion Medicine   Department [place] [address]   Pager: [phone] or [phone]                               Laboratory Medicine and Pathology  Transfusion Medicine- Transfusion Reaction    Edy Uribe MRN# [id number]   YOB: 1960 Age: 57 year old     Date of Reaction: May 28, 2018              Transfusion Reaction Evaluation   Impression[AB1.1]    Definitive febrile non-hemolytic[AB1.2] transfusion reaction[AB1.1] (FNHTR)[AB1.2], non-severe, of[AB1.1] probable[AB1.2] imputability.[AB1.1]    The patient experienced a 5.5F rise in temperature immediately following the transfusion of the first of 4 RBC units, meeting criteria for a definitive FNHTR. Of note, the patient's temperature jessica 1.2F during transfusion of unit #3; the transfusion was then stopped.  Transfusions of units #2 and #4 were completed without incident.[AB1.2]    Final culture results are pending.    Recommendation    Transfuse as needed.       ----------------------------------    History    57 year old man with stage IV bladder cancer and acute on chronic anemia who[AB1.1] presented with blood in his nephrostomy tube and colostomy bag and was found to be in shock secondary to hemorrhage and sepsis.[AB1.2]     Indication for transfusion:[AB1.1] A[AB1.2]cute on chronic anemia[AB1.1]    Unit #1[AB1.2] was completely transfused from[AB1.1] 0:41[AB1.2] to[AB1.1] 3:13 AM on 5/28/18[AB1.2].[AB1.1]    Unit #2[AB1.2] was completely transfused from[AB1.1] 04:10-05:15 on 5/28/18.    Unit #3[AB1.2] was incompletely transfused from[AB1.1] 6:45 AM[AB1.2] to[AB1.1] an unspecified time on 5/28/18[AB1.2].[AB1.1]    Unit #4[AB1.2] was completely transfused from[AB1.1] 14:00-16:30 on 5/28/18.[AB1.2]      Post-Transfusion Events[AB1.1]    The patient received a total of 4 units/partial units of RBC between 0:41 and 16:30 on 5/28/18.    The patient's temperature jessica 5.5F during transfusion of unit #1 (temperature shortly after transfusion was begun: 97.8F; temperature immediately following transfusion of this unit: 103.3F).    Transfusion of unit #2 was completed without incident.    The patient's temperature jessica 1.2F during transfusion of unit #3 (temperature shortly after transfusion was begun: 98.9F; temperature when the transfusion was stopped:  100.4F).    Transfusion of the unit #4 was completed without incident.[AB1.2]      Reported Symptoms (from [facility])[AB1.1]    Fever[AB1.2]      Vital Signs (From EPIC Blood Administration Flowsheet)[AB1.1]          [AB1.2]      Blood Bank Investigation  ([AB1.1]U[AB1.2]nits are listed in the ordered they were transfused)[AB1.1]    Applies to all units:[AB1.2]    Transfusion History:[AB1.1] None, per Epic[AB1.2]    Transfusion Reaction History:[AB1.1] None, per Epic[AB1.2]    Antibody Screen:[AB1.1] Negative    Unit #1[AB1.2]  Product Type: Red blood cells  Unit Number: [id number]  Amount Remaining:[AB1.1] ~1[AB1.2] ml  Post-Transfusion Clerical Check: Correct  ABO/Rh: The unit type was[AB1.1] A POS[AB1.2] and the patient was[AB1.1] A POS[AB1.2]. Per protocol, this unit is appropriately ABO compatible.  PATRICK:[AB1.1] Negative[AB1.2]  Post-Transfusion Plasma:[AB1.1] Straw[AB1.2]-colored    The unit was cultured and Gram stain was performed after adding 10 ml of saline prior to inoculating the blood culture bottles. Gram stain showed no organisms and culture is no growth to date, pending final.[AB1.1]    Unit #2[AB1.2]  Product Type: Red blood cells  Unit Number:[AB1.1] [id number]  Transfused without incident    Unit #3[AB1.2]  Product Type: Red blood cells  Unit Number:[AB1.1] [id number][AB1.2]  Amount Remaining:[AB1.1] ~220[AB1.2] ml  Post-Transfusion Clerical Check: Correct  ABO/Rh: The unit type was[AB1.1] A POS[AB1.2] and the patient was[AB1.1] A POS[AB1.2]. Per protocol, this unit is appropriately ABO compatible.  PATRICK:[AB1.1] Negative[AB1.2]  Post-Transfusion Plasma:[AB1.1] Straw[AB1.2]-colored    The unit was cultured and Gram stain was performed. Gram stain showed no organisms and culture is no growth to date, pending final.[AB1.1]    Unit #4[AB1.2]  Product Type: Red blood cells  Unit Number:[AB1.1] [id number]  Transfused without incident[AB1.2]    [facility] Hemovigilance  Case Definition:[AB1.1] Definitive  febrile non-hemolytic transfusion reaction[AB1.2]  Severity: Non-severe  Imputability:[AB1.1] Probable    Adriana Spence MD  Transfusion Medicine Service PGY3  Pager: [phone][AB1.2]            Revision History        User Key Date/Time User Provider Type Action    > AB1.2 5/29/2018  2:31 PM Adriana Spence MD Resident Sign     AB1.1 5/29/2018 11:49 AM Adriana Spence MD Resident             Consults by Eboni Izaguirre APRN CNP at 5/29/2018  9:45 AM     Author:  Eboni Izaguirre APRN CNP Service:  Palliative Author Type:  Nurse Practitioner    Filed:  5/29/2018  3:50 PM Date of Service:  5/29/2018  9:45 AM Creation Time:  5/29/2018  9:45 AM    Status:  Signed :  Eboni Izaguirre APRN CNP (Nurse Practitioner)     Consult Orders:    1. Palliative Care Adult IP Consult: Patient to be seen: Routine within 24 hrs; Call back #: [id number]; Patient with stage IV bladder cancer. Previously followed by palliative care at [facility] for symptom managment. Patient and family appreci... [[id number]] ordered by Queta Carmona MD at 05/28/18 1503                [facility]  Palliative Care Consultation Note    Patient: Edy Uribe  Date of Admission:  5/27/2018    Requesting provider/team: Queta aCrmona MD / MICU   Reason for consult:  Patient and family support     Patient / Family request     Recommendations:  -[JS1.1]Will involve Palliative  for additional spiritual support[JS1.2]       Thank you for the opportunity to participate in the care of this patient and family. Our team will follow. Please feel free to contact the on-call Palliative provider with any urgent needs.    WILFRED Brown CNP  Palliative Care Consult Team  Pager: [phone]    [facility] Inpatient Team Consult pager [phone] (M-F 8-4:30)  After-hours Answering Service [phone]   Palliative Clinic: [phone][JS1.1]     70[JS1.2] minutes  "spent with patient, with >50% counseling and in care coordination.    Assessment:  Edy Uribe is a 57 year old male with stage IV bladder cancer complicated by obstructive uropathy s/p bilateral nephrostomy tubes, and rectal obstruction s/p diverting ostomy. He was admitted on 5/27 with hematuria from his left nephrostomy tube[JS1.1] and recent maroon stools from his colostomy[JS1.3].[JS1.1]     I met with Fahad today to introduce the Palliative Care team and services we provide; such as symptom management, assistance navigating chronic illness and goals for treatment, counseling, psychosocial and spiritual support. He shared with me that he is hopeful but realistic; he believes God will either provide a miracle with his cancer or he will \"call him home\" and he is okay with either. While he is alive he wants to pursue medical treatment that could lengthen his life, and trust[JS1.4]s[JS1.2] that God will do the rest. 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]     Coping:[JS1.1] sees his entire life through the lens of his abril; uses prayer and music for comfort[JS1.4]    Spiritual/Adventist:    Spiritual background:[JS1.1] [employer][JS1.4]  Spiritual needs:[JS1.1] wants as much spiritual support as possible[JS1.4]   Sense/Meaning Making:[JS1.1] is hoping for a miracle, but endorses believing God is in control and he is at peace with whatever his future holds[JS1.4]     Advance Care " Planning:               Decision making capacity:[JS1.1] intact[JS1.2]        Disease understanding:[JS1.1] Patient understands that his medical team believes h[JS1.4]is cancer will end his life.[JS1.2]        Goals of Care:[JS1.1] live longer, if possible[JS1.2]       Health care directive:[JS1.1] not on file[JS1.2]       Health care agent:[JS1.1] n/a[JS1.2]       Code Status:  DNR / DNI       no POLST (Physician orders for life-sustaining treatment) form on file    History of Present Illness   Sources of History: electronic health record and patient's family     Edy Uribe is a 57 year old male with a past medical history metastatic bladder carcinoma with sarcomatoid changes and extensive rhabdomyoblastic differentiation, complicated by rectal obstruction from the bladder mass requiring bilateral PNTs and diverting colostomy.     Edy presented from the nursing home on 5/27 with bloody output from left PNT and colostomy. He was in shock and required ICU care for vasopressors.[JS1.1] The blood has subsided and he is now hemodynamically stable off vasopressors.[JS1.5]     ROS:  A comprehensive ROS has been negative other than stated in the HPI and below:   Palliative Symptom Review (0=no symptom/no concern, 1=mild, 2=moderate, 3=severe):  Pain:[JS1.1] mild-moderate chronic lower back pain[JS1.2]  Fatigue:[JS1.1] mild[JS1.2]   Nausea:[JS1.1] denies[JS1.2]  Depressive Symptoms:[JS1.1] denies[JS1.2]  Anxiety:[JS1.1] denies[JS1.2]  Drowsiness:[JS1.1] denies[JS1.2]  Poor Appetite:[JS1.1] denies[JS1.2]  Shortness of Breath:[JS1.1] denies[JS1.2]   Insomnia:[JS1.1] denies[JS1.2]   Delirium:[JS1.1] denies[JS1.2]        Past Medical History:   No past medical history on file.       Past Surgical History:   No past surgical history on file.          Family History:   No family history on file.         Allergies:   No Known Allergies         Medications:   I have reviewed this patient's medication profile and medications  given in the past 24 hours.           Physical Exam:   Vital Signs: Temp: 97.3  F (36.3  C) Temp src: Axillary BP: 95/63   Heart Rate: 98 Resp: 12 SpO2: 99 % O2 Device: BiPAP/CPAP Oxygen Delivery: 2 LPM  Weight: 230 lbs 9.62 oz    Constitutional: Pleasant male, seen lying in hospital bed. Ill appearing, but in no acute distress.  Head: Normocephalic. Atraumatic. MMM.   Extremities: Warm and well perfused.   Pulm: Non-labored breathing. Speaking in complete sentences.    Musculoskeletal: WOLF.  Skin:[JS1.1] pale.[JS1.2] No jaundice. No concerning rashes or lesions on exposed areas.   Neuro: A/O x 4. Face symmetrical. PERRL. EOMI.   Psych: Speech clear. Memory and insight intact.        Data reviewed:     CMP[JS1.1]  Recent Labs  Lab 05/29/18  0420 05/28/18  0254 05/27/18  2320    138 134   POTASSIUM 3.4 4.5 4.5   CHLORIDE 111* 104 102   CO2 21 24 22   ANIONGAP 11 10 10   * 113* 146*   BUN 37* 46* 47*   CR 1.22 1.55* 1.72*   GFRESTIMATED 61 46* 41*   GFRESTBLACK 73 56* 49*   KAHTI 8.4* 8.5 8.5   MAG  --   --  2.5*   PROTTOTAL 5.7* 6.2* 5.9*   ALBUMIN 1.4* 1.6* 1.5*   BILITOTAL 0.4 0.4 0.4   ALKPHOS 133 166* 159*   AST 30 52* 54*   ALT 18 29 27[JS1.6]     CBC[JS1.1]  Recent Labs  Lab 05/29/18  0800 05/29/18  0420 05/28/18  2356 05/28/18  2057  05/28/18  0607 05/28/18  0354 05/27/18  2320   WBC  --  12.7*  --   --   --  11.3* 12.0* 13.5*   RBC  --  2.91*  --   --   --  2.71* 2.62* 2.28*   HGB 7.4* 7.5* 7.6* 7.6*  < > 6.9* 6.5* 5.5*   HCT  --  24.4*  --   --   --  22.7* 21.7* 18.8*   MCV  --  84  --   --   --  84 83 83   MCH  --  25.8*  --   --   --  25.5* 24.8* 24.1*   MCHC  --  30.7*  --   --   --  30.4* 30.0* 29.3*   RDW  --  16.8*  --   --   --  16.7* 16.9* 17.1*   PLT  --  360  --   --   --  377 396 421   < > = values in this interval not displayed.[JS1.6]  INR[JS1.1]  Recent Labs  Lab 05/29/18  0420 05/28/18  0254 05/27/18  2320   INR 1.18* 1.20* 1.27*[JS1.6]     Arterial Blood Gas[JS1.1]  Recent  Labs  Lab 05/28/18  0926 05/28/18  0258   PH 7.39 7.40   PCO2 35 35   PO2 90 76*   HCO3 21 21   O2PER 2L 2L[JS1.6]                Revision History        User Key Date/Time User Provider Type Action    > JS1.5 5/29/2018  3:50 PM Eboni Izaguirre APRN CNP Nurse Practitioner Sign     JS1.2 5/29/2018  2:59 PM Eboni Izaguirre APRN CNP Nurse Practitioner      JS1.4 5/29/2018 12:52 PM Eboni Izaguirre APRN CNP Nurse Practitioner      JS1.3 5/29/2018 10:11 AM Eboni Izaguirre APRN CNP Nurse Practitioner      JS1.6 5/29/2018  9:46 AM Eboni Izaguirre APRN CNP Nurse Practitioner      JS1.1 5/29/2018  9:45 AM Eboni Izaguirre APRN CNP Nurse Practitioner             Consults by Angelique Stewart MD at 5/28/2018  4:33 PM     Author:  Angelique Stewart MD Service:  Gastroenterology Author Type:  Fellow    Filed:  5/28/2018  5:08 PM Date of Service:  5/28/2018  4:33 PM Creation Time:  5/28/2018  4:33 PM    Status:  Attested :  Angelique Stewart MD (Fellow)    Cosigner:  Jose Rojas MD at 5/28/2018  6:32 PM         Consult Orders:    1. GI LUMINAL ADULT IP CONSULT: Patient to be seen: Routine within 24 hrs; Call back #: [id number]; 58 y/o with invasive bladder cancer s/p diverting colostomy and recent Cdiff, now with some bloody output form ostomy. GI fellow saw patient overnight.; Co... [[id number]] ordered by Queta Carmona MD at 05/28/18 0945           Attestation signed by Jose Rojas MD at 5/28/2018  6:32 PM        The patient was seen on request of Internal Medicine (attending Dr. Baltazar) for evaluation of possible GI bleed and anemia.  I have personally seen and examined the patient with the GI fellow, and agree with his note.    Patient's history suggests that the urinary system is the main source of bleeding leading up to the admission.  However, maroon-colored stool output suggests greater contribution of a GI source.  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  This is exactly when his chances of relapse are the greatest.  Therefore, the first priority is to test his stool for C. Difficile.  If positive, he should be back on vancomycin 125 mg po qid x 2 weeks, after which the dose could be decreased to 125 mg daily indefinitely (for as long as he is undergoing cancer treatments and continues to have urinary infections).                                   GASTROENTEROLOGY CONSULTATION      Date of Admission:  5/27/2018          ASSESSMENT AND RECOMMENDATIONS:   ASSESSMENT:  57 year old male with a history of extensive primary bladder carcinoma with sarcomatoid changes & extensive rhabdomyoblastic differentiation c/w rectal obstruction from the bladder mass and extrinsic compression requiring an emergent diverting colostomy and obstructive uropathy (s/p B/L PNTs) - currently on neoadjuvant chemotherapy and has only received once cycle of carboplatin and gemcitabine (held due to overall functional decline as well as C Diff infection), hx of C Diff colitis requiring a prolonged course of PO vancomycin, L5/S1 OM, SAMUEL who was initially admitted to the hospital for c/o bloody output from the nephrostomy tube. On arrival, he was noted to be in shock (hypotensive and tachycardic) thought to be septic vs hemorrhagic and patient was transferred to the ICU (required central line placement and vasopressors overnight, currently on low dose norepinpehrine). Gastroenterology team has been consulted due to concern of a bloody output from his colostomy and concern for hemorrhagic shock.   Labs reveal mild leukocytosis to 13k, Hb of 5.5 on admission that corrected to 7.1 with 2U PRBC (concern for a febrile hemolytic transfusion reaction with the 3U) and then trended slightly down to 6.6 today. Procalcitonin elevated to 11.54. Mild EVERTON with a Cr of 1.55.     Patient does not seem to have a significant hx  of any ongoing GI bleeding. He denies any worsening abdominal pain but can have a recurrence of his C Diff infection (2 weeks post treatment is the peak time for recurrence). Would not favor a colonoscopic evaluation at this time without evidence of significant ongoing GI bleeding.     As pointed out by my colleague who wrote the brief note overnight, differential diagnosis for Mr. Uribe's bloody ostomy output is broad and can include direct tumor invasion of surrounding structures +/- vascular involvement (would be concerned for this given nephrostomy bleeding), stomal irritation, C difficile infection; less likely stomal varices (no h/o portal hypertension), brisk upper GI bleed, AVM, etc.      RECOMMENDATIONS:  - Reasonable to continue pantoprazole infusion for possible UGIB.  - Maintain 2 large bore IVs and active type and cross.  - Please check for C Diff PCR and stool enteropathogens.  - Continue to trend H&h and transfuse for Hb > 7.  - Agree with urological evaluation for bleeding from PNTs.  - Agree with primary team's evaluation for sources for potential septic shock in light of an elevated procalcitonin.  - Will consider a[UH1.1] endoscopic[UH1.2] evaluation[UH1.1] (EGD +/- colonoscopy)[UH1.2] if patient has[UH1.1] convincing[UH1.3] evidence of ongoing GI bleeding leading to hemodynamic compromise.     Gastroenterology team will continue to follow with you. Thank you for involving us in this patient's care. Please do not hesitate to contact the GI service with any questions or concerns.   Patient care plan has been discussed with Dr. Rojas, GI staff physician.    Angelique Stewart  Gastroenterology Fellow  P [phone]  -------------------------------------------------------------------------------------------------------------------          Chief Complaint:   We were asked by Queta Rubalcava of the MICU to evaluate this patient with bloody output from his colostomy.  History is obtained from the patient  "and the medical record.          History of Present Illness:   Edy Uribe is a 57 year old male with a history of extensive primary bladder carcinoma with sarcomatoid changes & extensive rhabdomyoblastic differentiation c/w rectal obstruction from the bladder mass and extrinsic compression requiring an emergent diverting colostomy and obstructive uropathy (s/p B/L PNTs) - currently on neoadjuvant chemotherapy and has only received once cycle of carboplatin and gemcitabine (held due to overall functional decline as well as C Diff infection), hx of C Diff colitis requiring a prolonged course of PO vancomycin, L5/S1 OM, SAMUEL who was initially admitted to the hospital for c/o bloody output from the nephrostomy tube. On arrival, he was noted to be in shock (hypotensive and tachycardic) thought to be septic vs hemorrhagic and patient was transferred to the ICU (required central line placement and vasopressors overnight, currently on low dose norepinpehrine). Gastroenterology team has been consulted due to concern of a bloody output from his colostomy and concern for hemorrhagic shock.     Patient accompanied by several family members at bedside today who were able to provide most of the history. Patient primarily reports bleeding from the nephrostomy tubes (\"significant bleeding which seems to have subsided\"). He has also noticed maroon colored output in his colostomy which he reports as small amounts. He has noticed similar symptoms in the past with his C Diff infections and thinks this may be a recurrence of his C diff colitis.  Labs reveal mild leukocytosis to 13k, Hb of 5.5 on admission that corrected to 7.1 with 2U PRBC (concern for a febrile hemolytic transfusion reaction with the 3U) and then trended slightly down to 6.6 today. Procalcitonin elevated to 11.54. Mild EVERTON with a Cr of 1.55. He has been started on a pantoprazole infusion for concern for an UGIB. He has also been started on broad spectrum " antibiotics for possible sepsis.     No prior hx of colonoscopy.   No family hx of colorectal malignancy.  He does have a hx of recent C Diff infection though hard to ascertain from records about the number of episodes and duration of treatment. He was put on a long tapering course of PO vancomycin which he completed a week ago as per the family members.            Past Medical History:   Reviewed and edited as appropriate  1. 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 Rectal obstruction related to bladder mass and intrinsic compression, status post diverting colostomy  4. Chronic anemia  5. L5/S1 osteomyelitis vs tumor invasion  6. Recent history of clostridium difficile colitis  7. SAMUEL         Past Surgical History:   Reviewed and edited as appropriate   1. Bilateral nephrostomy tubes   2. TURP for prostate in 2017   3. Diverting colostomy for rectal obstruction 2/2 bladder mass 2/1/18         Previous Endoscopy:   - No prior endoscopy         Social History:   Reviewed and edited as appropriate:  Marriage Status: single  Tobacco: Never smoker  Alcohol: No         Family History:   Reviewed and edited as appropriate  No known history of gastrointestinal/liver disease or  gastrointestinal malignancies       Allergies:   Reviewed and edited as appropriate   No Known Allergies         Medications:[UH1.1]     Current Facility-Administered Medications   Medication     0.9% sodium chloride BOLUS     0.9% sodium chloride BOLUS     acetaminophen (TYLENOL) tablet 650 mg    Or     acetaminophen (TYLENOL) Suppository 650 mg     albumin human 25 % injection 25 g     lidocaine (LMX4) kit     lidocaine 1 % 1 mL     naloxone (NARCAN) injection 0.1-0.4 mg     norepinephrine (LEVOPHED) 16 mg in D5W 250 mL infusion     oxyCODONE IR (ROXICODONE) tablet 5 mg     pantoprazole (PROTONIX) 80 mg in sodium chloride 0.9 % 100 mL  "infusion     piperacillin-tazobactam (ZOSYN) 4.5 g vial to attach to  mL bag     sodium chloride (PF) 0.9% PF flush 3 mL     sodium chloride (PF) 0.9% PF flush 3 mL     sodium chloride 0.9% infusion     vancomycin (VANCOCIN) 2,000 mg in sodium chloride 0.9 % 500 mL intermittent infusion[UH1.4]             Review of Systems:   A complete review of systems was performed and is negative except as noted in the HPI           Physical Exam:   BP 97/58  Pulse 114  Temp 97.1  F (36.2  C) (Axillary)  Resp 18  Ht 1.885 m (6' 2.2\")  Wt 104.9 kg (231 lb 4.2 oz)  SpO2 91%  BMI 29.53 kg/m2  Wt:[UH1.1]   Wt Readings from Last 2 Encounters:   05/28/18 104.9 kg (231 lb 4.2 oz)[UH1.5]      Constitutional: not dyspneic/diaphoretic, no acute distress  Eyes: Sclera anicteric/injected  Ears/nose/mouth/throat: Normal oropharynx without ulcers or exudate, mucus membranes moist, hearing intact  Neck: supple, thyroid normal size  CV: No edema  Respiratory: Unlabored breathing  Lymph: No axillary, submandibular, supraclavicular or inguinal lymphadenopathy  Abd: Large suprapubic mass, Colostomy in LLQ with maroon colored output, no hepatosplenomegaly, Mild TTP in upper quadrants, no peritoneal signs. B/L PNTs in place draining pinkish tinged urine.  Skin: warm, perfused, no jaundice  Neuro: AAO x 2, No asterixis  Psych: Normal affect  MSK: No gross deformities         Data:   Labs and imaging below were independently reviewed and interpreted    BMP[UH1.1]  Recent Labs  Lab 05/28/18  0254 05/27/18  2320    134   POTASSIUM 4.5 4.5   CHLORIDE 104 102   KATHI 8.5 8.5   CO2 24 22   BUN 46* 47*   CR 1.55* 1.72*   * 146*[UH1.5]     CBC[UH1.1]  Recent Labs  Lab 05/28/18  1142  05/28/18  0607 05/28/18  0354 05/27/18  2320   WBC  --   --  11.3* 12.0* 13.5*   RBC  --   --  2.71* 2.62* 2.28*   HGB 6.6*  < > 6.9* 6.5* 5.5*   HCT  --   --  22.7* 21.7* 18.8*   MCV  --   --  84 83 83   MCH  --   --  25.5* 24.8* 24.1*   MCHC  --   --  " 30.4* 30.0* 29.3*   RDW  --   --  16.7* 16.9* 17.1*   PLT  --   --  377 396 421   < > = values in this interval not displayed.[UH1.5]  INR[UH1.1]  Recent Labs  Lab 05/28/18  0254 05/27/18  2320   INR 1.20* 1.27*[UH1.5]     LFTs[UH1.1]  Recent Labs  Lab 05/28/18  0254 05/27/18  2320   ALKPHOS 166* 159*   AST 52* 54*   ALT 29 27   BILITOTAL 0.4 0.4   PROTTOTAL 6.2* 5.9*   ALBUMIN 1.6* 1.5*[UH1.5]      PANC[UH1.1]No lab results found in last 7 days.[UH1.5]    IMAGING:  CT abdomen/pelvis 5/28/18:   1. Examination for etiology of bleeding is limited by the lack of intravenous contrast.   2a. Markedly abnormal appearance of the urinary bladder containing   soft tissue density as well as foci of air which is likely due to a   combination of bladder malignancy and blood clot. The urinary bladder   is significantly distended up to 26.2 cm.   2b. Confluent with the bladder mass posteriorly is a soft tissue mass   measuring 9.7 x 4.5 x 13.4 cm, which appears contiguous with the   sigmoid colon and suspicious for invasion of the sigmoid colon. There   is a diverting colostomy in the left upper quadrant at the level of   the distal transverse colon.   2c. Erosion of the L5 vertebral body which is suspicious for   metastasis or local invasion of the L5 vertebral body from the urinary   bladder mass.  3. Bilateral percutaneous nephrostomy tubes with persistent moderate hydronephrosis.  4. Cholelithiasis without evidence of cholecystitis[UH1.1]         Revision History        User Key Date/Time User Provider Type Action    > UH1.2 5/28/2018  5:08 PM Angelique Stewart MD Fellow Sign     [N/A] 5/28/2018  5:07 PM Angelique Stewart MD Fellow Sign     UH1.3 5/28/2018  5:07 PM Angelique Stewart MD Fellow Sign     UH1.4 5/28/2018  5:06 PM Angelique Stewart MD Fellow Share     UH1.5 5/28/2018  4:34 PM Angelique Stewart MD Fellow      UH1.1 5/28/2018  4:33 PM Angelique Stewart MD Fellow             Consults by Renée Che MD at 5/27/2018 10:57 PM     Author:  Yane  MD Renée Service:  Urology Author Type:  Resident    Filed:  5/28/2018 11:07 AM Date of Service:  5/27/2018 10:57 PM Creation Time:  5/28/2018 10:55 AM    Status:  Attested :  Renée Che MD (Resident)    Cosigner:  Joo Kang MD at 5/28/2018 11:13 AM         Consult Orders:    1. Urology IP Consult: Patient with stage IV bladder cancer and hematuria from left nephrostomy tube; Consultant may enter orders: Yes; Patient to be seen: Routine - within 24 hours [[id number]] ordered by Ludy Bower MD at 05/28/18 0616           Attestation signed by Joo Kang MD at 5/28/2018 11:13 AM        Attestation:  Physician Attestation   I, Joo Kang, saw this patient with the resident and agree with the resident and/or medical student's findings and plan of care as documented in the note.      I personally reviewed vital signs, medications, labs and imaging.    Key findings: large bladder tumor/clot.  Bilateral PNTs in place.  Left draining faint pink , yellow crystal clear.  Red discharge in colostomy bag.  There is not any current significant blood loss from PNT tubes.  No urologic interventions at this time.    Joo Kang MD  Date of Service (when I saw the patient): 05/28/18                               Urology Consult    Name: Edy Uribe    MRN: [id number]   YOB: 1960       We were asked to see Edy Uribe for evaluation and treatment of the following chief complaint.        Chief Complaint:   Bleeding from PNT           History of Present Illness:   Edy Uribe is a 57 year old male with ho of primary bladder carcinoma with sarcomatoid changes and extensive rhabdomyoblastic differentiation; cT4 s/p diverting transverse colostomy for obstruction. He is also s/p bilateral PNTs for obstructive uropathy. He was undergoing jamie-adjuvant chemo with carboplatin and gemcitabine (per heme-onc notes from [facility], received 1 cycle last in  march, as there was no evidence of mets at that time, again per nore review in care everywhere) but was stopped due to C DIff colitis.   He has finally been discharged from the rehabilitation facility after a too-long stay; he will be finishing   Nephrostomy tube exchange completed March 6.Patient has been recovering since then but is hospitalized today due to concern for bleeding from nephrostomy tube, colostomy and Hg 5.5.    Patient denies any bladder or flank pain. He feels weak.            Past Medical History:   No past medical history on file.         Past Surgical History:   No past surgical history on file.         Social History:     Social History   Substance Use Topics     Smoking status: Not on file     Smokeless tobacco: Not on file     Alcohol use Not on file            Family History:   No family history on file.         Allergies:   No Known Allergies         Medications:     Current Facility-Administered Medications   Medication     0.9% sodium chloride BOLUS     0.9% sodium chloride BOLUS     acetaminophen (TYLENOL) tablet 650 mg    Or     acetaminophen (TYLENOL) Suppository 650 mg     albumin human 25 % injection 25 g     HYDROmorphone (DILAUDID) injection 0.2 mg     lactated ringers BOLUS 500 mL     lidocaine (LMX4) kit     lidocaine 1 % 1 mL     naloxone (NARCAN) injection 0.1-0.4 mg     norepinephrine (LEVOPHED) 16 mg in D5W 250 mL infusion     pantoprazole (PROTONIX) 80 mg in sodium chloride 0.9 % 100 mL infusion     piperacillin-tazobactam (ZOSYN) 4.5 g vial to attach to  mL bag     sodium chloride (PF) 0.9% PF flush 3 mL     sodium chloride (PF) 0.9% PF flush 3 mL     sodium chloride 0.9% infusion     vancomycin (VANCOCIN) 2,000 mg in sodium chloride 0.9 % 500 mL intermittent infusion             Review of Systems:    ROS: 10 point ROS neg other than the symptoms noted above in the HPI           Physical Exam:   VS:  T: 101.2    HR: 114    BP: 96/49    RR: 18   GEN:  AOx3.  NAD.     CV:  RRR  LUNGS: Non-labored breathing.   BACK:  No midline or CVA tenderness.  ABD:  Firm suprapubic mass, non tender. Rest of belly is soft. Stoma with no output  PNTsL right with clear yellow urine, left with pink colored output   :  Uncircumcised with some blood at the meatus   SKIN:  Warm.  Dry.  No rashes.  NEURO:  CN grossly intact.            Data:   All laboratory data reviewed:      Recent Labs  Lab 05/28/18  0845 05/28/18  0607 05/28/18  0354 05/27/18  2320   WBC  --  11.3* 12.0* 13.5*   HGB 7.1* 6.9* 6.5* 5.5*   PLT  --  377 396 421       Recent Labs  Lab 05/28/18  0254 05/27/18  2320    134   POTASSIUM 4.5 4.5   CHLORIDE 104 102   CO2 24 22   BUN 46* 47*   CR 1.55* 1.72*   * 146*   KATHI 8.5 8.5   MAG  --  2.5*       Recent Labs  Lab 05/28/18  0031   COLOR Dark Red   APPEARANCE Cloudy   URINEGLC Negative   URINEBILI Negative   URINEKETONE Negative   SG 1.020   URINEPH 6.0   PROTEIN 300*   NITRITE Positive*   LEUKEST Moderate*   RBCU 1923*   WBCU 2853*       All pertinent imaging reviewed:    Results for orders placed or performed during the hospital encounter of 05/27/18   XR Chest Port 1 View    Narrative    Exam: XR CHEST PORT 1 VW, 5/28/2018 12:16 AM    Indication: hypotension;     Comparison: None    Findings:   Portable vasculature is prominent. Heart size within normal limits.  Low lung volumes. Clear lungs. No pneumothorax or pleural effusion.  Upper abdomen is unremarkable.      Impression    Impression:  Low lung volumes.  Clear lungs.    I have personally reviewed the examination and initial interpretation  and I agree with the findings.    MARY PICKETT MD   CT Abdomen Pelvis w/o Contrast    Narrative    RESIDENT PRELIMINARY REPORT - The following report is a preliminary  interpretation.      1. Examination for malignancy of bleeding is limited by the lack of  intravenous contrast.  2a. Markedly abnormal appearance of the urinary bladder containing  soft tissue density as  well as foci of air which is likely due to a  combination of bladder malignancy and blood clot. The urinary bladder  is significantly distended up to 26.2 cm.  2b. Confluent with the bladder mass posteriorly is a soft tissue mass  measuring 9.7 x 4.5 x 13.4 cm, which appears contiguous with the  sigmoid colon and suspicious for invasion of the sigmoid colon. There  is a diverting colostomy in the left upper quadrant at the level of  the distal transverse colon.  2c. Erosion of the L5 vertebral body which is suspicious for  metastasis or local invasion of the L5 vertebral body from the urinary  bladder mass.  3. Hyperdense stool in the right colon and remaining transverse colon,  which is nonspecific and may be due to inspissated stool, excretion of  prior contrast, blood products, or a combination of both.  4. Bilateral percutaneous nephrostomy tubes with persistent moderate  hydronephrosis.  5. Cholelithiasis without evidence of cholecystitis.   XR Chest Port 1 View    Narrative    Chest radiograph one view  5/28/2018 7:58 AM      HISTORY: New central line placement    COMPARISON: 5/28/2018    FINDINGS: Left PICC tip projects over the lower SVC. Cardiac  silhouette is unchanged. Unchanged perihilar opacities. No pleural  effusion or pneumothorax.      Impression    IMPRESSION:   1. Left PICC tip projects over the lower SVC. No pneumothorax.  2. Unchanged perihilar opacities.                Impression and Plan:   Impression:   Edy Uribe is a 57 year old male with at least locally advanced bladder cancer with variant histology presenting with anemia, could be due to bleeding could be from tumor or decreased erythrocytosis       Plan:     - no evidence of gross bleeding from PNTs, wouldn't explain the significant drop in Hg    - would work up bleeding in GI tract as patient reported blood out of colostomy     - recommend palliative care consult as disease appears to be advanced and morbidity/mortality of  surgical intervention is very high with questionable benefit              This patient's exam findings, labs, and imaging discussed with urology staff surgeon Dr. Kang, who developed the treatment plan.    Renée Che MD  Urology Resident PGY3[AS1.1]            Revision History        User Key Date/Time User Provider Type Action    > AS1.1 5/28/2018 11:07 AM Renée Che MD Resident Sign                     Progress Notes - Physician (Notes from 05/28/18 through 05/31/18)      Progress Notes by Briana Frye MSW at 5/31/2018  2:30 PM     Author:  Briana Frye MSW Service:  Social Work Author Type:      Filed:  5/31/2018  3:12 PM Date of Service:  5/31/2018  2:30 PM Creation Time:  5/31/2018  2:30 PM    Status:  Addendum :  Briana Frye MSW ()         Social Work Services Discharge Note      Patient Name:  Edy Uribe     Anticipated Discharge Date:[KH1.1]  5/31/2018[KH1.2]    Discharge Disposition:   TCU:  [facility]: [phone]  Admissions dieudonneelizabethAbraham/Anthony: [phone]    Following MD:  Per facility assignment     Pre-Admission Screening (PAS) online form has been completed.  The Level of Care (LOC) is:  Determined  Confirmation Code is:  No PAS required for returning TCU patient.     Patient/caregiver informed of referral to [facility] for Pre-Admission Screening for skilled nursing facility (SNF) placement and to expect a phone call post discharge from SNF.     Additional Services/Equipment Arranged:  Transportation arranged, via [employer] [facility] stretcher: [phone]     Patient / Family response to discharge plan:    Patient and family disappointed in [facility] discharge to [facility] as the family is unable to visit to this due to the long distance[KH1.1], but patient is in agreement of plan to discharge to [facility] and receive assistance from  at TCU for transition to [facility].[KH1.3]      Persons notified of above discharge plan:[KH1.1]  Patient, at bedside. Call to sister Daniela and provided plan details regarding [place] and transition to [place]. Daniela advised that she will pass along information to sister Yesy, and patient's girlfriend and discuss [place] placement options.[KH1.3]    Staff Discharge Instructions:  Please fax discharge orders and signed hard scripts for any controlled substances.  Please print a packet and send with patient.     CTS Handoff completed:[KH1.1]  No- no primary listed, patient has primary at TCU.[KH1.4]     Medicare Notice of Rights provided to the patient/family:[KH1.1]  Patient does not have Medicare, Insurance [place] coverage.    SHANI Horvath, SW  ICU 4A, 4C, 4E   Ph: [phone][KH1.3]           Revision History        User Key Date/Time User Provider Type Action    > KH1.4 5/31/2018  3:12 PM Briana Frye MSW  Addend     [N/A] 5/31/2018  3:10 PM Briana Frye MSW  Addend     KH1.3 5/31/2018  3:07 PM Briana Frye MSW  Sign     KH1.2 5/31/2018  2:31 PM Briana Frye MSW       KH1.1 5/31/2018  2:30 PM Briana Frye MSW              Progress Notes by Briana Frye MSW at 5/31/2018  8:52 AM     Author:  Briana Frye MSW Service:  Social Work Author Type:      Filed:  5/31/2018  2:30 PM Date of Service:  5/31/2018  8:52 AM Creation Time:  5/31/2018  8:52 AM    Status:  Signed :  Briana Frye MSW ()         Social Work Services Progress Note[KH1.1]    Hospital Day: 5[KH1.2]  Date of Initial Social Work Evaluation:[KH1.1]  5/30/18[KH1.2]  Collaborated with:  Patient, sister Yesy: ([phone]), Care Coordinator Liz (*[phone])[KH1.1], Medical team, Yari SRIVASTAVA[KH1.3]    Data:      Latest RN note states transfer order to adult med/surg. Steps started[KH1.1] 5/30/18[KH1.4] for possible discharge to  [facility] [place] TCU ([phone]), where he was prior to hospitalization, and [facility] in [place] ([phone]). [facility]  admissions contact, Syed: [phone]-direct[KH1.1]. No update from [facility] for intake- [facility].     Update from care conference and RNCC, patient ready to discharge today, has been cleared.[KH1.3]     Intervention:[KH1.1]      1. Confirmed at rounds of discharge clearance.  2. Sent page to JUAN J Villalobos MD, regarding discharge clearance. Requested orders to be completed and any narcotics will need paper rx for discharge.   3. Received message from [facility] [place] coordinator, Abraham: [phone] requesting arrival after 3pm. Also requested resend of updated notes as TT fax was down yesterday.   4. Resent initial SNF referral through DOD.  5. Transport arranged with [employer] [phone], 330pm  to [facility] [place] in [place] via ambulance/stretcher.  6. Called Abraham with [facility]- advised to 330pm /transport to [facility] [place]. Requested confirmation of paperwork arrival- he will call back with confirmation.[KH1.3]    7. Received message from patient's other sister Daniela ([phone]), regarding a different facility in the [facility] that does have a bed open and may be able to accept the patient after review. Jaison Garcia[KH1.5]     Assessment:[KH1.1]      Patient not pleased about discharge to [facility] [place] in [facility]; he will want assistance from [facility] [place] to facilitate transfer to [facility].[KH1.6]    Plan:    Anticipated Disposition:[KH1.1]  Facility:  [facility][KH1.3]    Barriers to d/c plan:[KH1.1]  CDif precautions, has been immobile throughout stay, pain[KH1.3]    Follow Up:[KH1.1]    [facility] Transport: [phone] arriving 330pm. Has Unit 4c phone number.   RX orders needed by [facility][KH1.3]    SHANI Horvath,  LGSW  ICU 4A, 4C, 4E   Ph: [phone][KH1.6]         Revision History        User Key Date/Time User Provider Type Action    > KH1.6 2018  2:30 PM Briana Frye MSW  Sign     KH1.5 2018 12:27 PM Briana Frye MSW       KH1.3 2018 11:18 AM Briana Frye MSW       KH1.4 2018  8:55 AM Briana Frye MSW       KH1.2 2018  8:53 AM Briana Frye MSW       KH1.1 2018  8:52 AM Briana Frye MSW              Progress Notes by Briana Frye MSW at 2018  3:07 PM     Author:  Briana Frye MSW Service:  Social Work Author Type:      Filed:  2018  8:50 AM Date of Service:  2018  3:07 PM Creation Time:  2018  3:07 PM    Status:  Addendum :  Briana Frye MSW ()         Social Work: Assessment with Discharge Plan    Patient Name:  Edy Uribe  :  1960  Age:  57 year old  MRN:  [id number]  Risk/Complexity Score:  Filed Complexity Screen Score: 6  Completed assessment with:  Patient, patient's sister Yesy, Care Coordinator Liz Escobar, Resident MD Ana Villalobos    Presenting Information   Reason for Referral:  Discharge plan  Date of Intake:  May 30, 2018[KH1.1]?/ TBD[KH1.2]  Referral Source:  Patient  Decision Maker:  Patient  Alternate Decision Maker:  n/a  Health Care Directive: Not on file  Living Situation:  Apartment  Previous Functional Status:  Assistance with Other:  per TCU staff  Patient and family understanding of hospitalization:  Patient understands and hopes to discharge for rehab and possible chemotherapy  Cultural/Language/Spiritual Considerations:  Restorationist  Adjustment to Illness:  Patient is forward thinking; patient remains hopeful for future treatment and made discharge goals    Physical Health  Reason for Admission:[KH1.1]    1. GI bleed    2. Hypotension due to blood  loss    3. EVERTON (acute kidney injury) (H)    4. Malignant neoplasm of urinary bladder, unspecified site (H)    5. Hematuria, gross    6. Nephrostomy tube bleed (H)    7. Dehydration[KH1.3]      Services Needed/Recommended:  TCU    Mental Health/Chemical Dependency  Diagnosis:  n/a  Support/Services in Place:  n/a  Services Needed/Recommended:  n/a    Support System  Significant relationship at present time:  Fiance/girlfriend, sisters and brothers in [facility]  Family of origin is available for support:  Yes  Other support available:  unknown  Gaps in support system:  none  Patient is caregiver to:  None     Provider Information   Primary Care Physician:  No Ref-Primary, Physician   None   Clinic:        :  n/a    Financial   Income Source:[KH1.1]  unknown[KH1.4]  Financial Concerns:[KH1.1]  None stated[KH1.4]  Insurance:    Payor/Plan Subscriber Name Rel Member # Group #   BLUE PLUS - BLUE PLUS* RODNEY CHRISTENSEN*  [id number]       [address] [id number]       Discharge Plan   Patient and family discharge goal:  Patient interested in discharge close to [facility], plans on rehab for strengthening to be eligible for Chemotherapy. Referred from [facility] and likely to return, though this is not ideal for patient.   Provided education on discharge plan:  YES  Patient agreeable to discharge plan:  Yes, but would prefer [facility] TCU/SNF  A list of Medicare Certified Facilities was provided to the patient and/or family to encourage patient choice. Patient's choices for facility are:  [facility] is preferred   Will NH provide Skilled rehabilitation or complex medical:  YES  General information regarding anticipated insurance coverage and possible out of pocket cost was discussed. Patient and patient's family are aware patient may incur the cost of transportation to the facility, pending insurance payment: yes- Patient has [place] coverage, which should cover  transportation  Barriers to discharge:  Medical clearance    Discharge Recommendations   Anticipated Disposition:  Facility:  TBD-- [facility] in process  Transportation Needs:  Medical:  Stretcher  Name of Transportation Company and Phone:[KH1.1]  [facility] ph:[phone][KH1.4]    Additional comments[KH1.1]   1. Met with patient at bedside to discuss discharge; patient would like to be placed in a nursing home with PT capabilities near his home in [place], [place]. All of his family is there and a previous placement in [facility] left him isolated from his family and he does not want this distance again, therefore would prefer to not be in [facility]. Advised that likely return to [facility] TCU as this was where he came from, patient would like to avoid this and has been in contact with his sister regarding placement at [facility] in [place], as he had a previous approval for admittance prior to transfer from [facility] to [facility]. He would like rehab for strengthening as goal is to receive Chemotherapy as hopeful for treatment results.    2[KH1.4]. Spoke with sister Yesy ([phone]), complications with previous transfer from hospital to placement at [facility] and patient was transferred to [facility]. Per sister, [facility] has not been contacted regarding an open bed for the patient.[KH1.1]    3. Spoke with Resident MDJUAN J regarding patient goals and possible discharge tomorrow. Liz, Care coordinator contacted this writer with approval for likely discharge tomorrow, time TBD tomorrow morning at 845am rounds, will check in 5/31 at 9am.    4[KH1.4]. Contacted [facility] ([phone]) regarding possible admission to LTC. Unable to reach admissions, left message requesting return call.[KH1.1]    5[KH1.4]. Sent Initial SNF referral through [facility] to  [facility], previous TCU of patient.[KH1.1]    6[KH1.4]. Received phone call from Abraham with [facility] placement: [phone]. Called back, unable to reach, left message for call back today.[KH1.1]    7[KH1.4]. Attempted to contact [facility] admissions again, unable to reach, unable to leave a message.[KH1.1]     8.[KH1.4] Faxed referral information to [facility] in [place], fax:[phone]. Fax successful. Called and left second message with social Geno Lynch admissions worker (gone today 5/30). Advised that patient will likely try to transfer to facility, if possible to call this writer in the morning regarding admission and or transfer information. Social Gen[facility] Lynch: [phone]-direct.    9.[KH1.5] Met with patient at bedside regarding update for the day. Patient aware and this writer will check in tomorrow morning 5/31.[KH1.6]      SHANI Horvath, LGSW  ICU 4A, 4C, 4E   Ph: [phone][KH1.1]           Revision History        User Key Date/Time User Provider Type Action    > KH1.2 5/31/2018  8:50 AM Briana Frye MSW  Addend     KH1.6 5/30/2018  4:38 PM Briana Frye MSW  Sign     KH1.5 5/30/2018  4:19 PM Briana Frye MSW       KH1.4 5/30/2018  3:47 PM Briana Frye MSW       KH1.3 5/30/2018  3:12 PM Briana Frye MSW       KH1.1 5/30/2018  3:07 PM Briana Frye MSW              Progress Notes by Kelley Lozano MD at 5/30/2018 11:13 AM     Author:  Kelley Lozano MD Service:  General Medicine Author Type:  Physician    Filed:  5/30/2018  4:42 PM Date of Service:  5/30/2018 11:13 AM Creation Time:  5/30/2018 11:13 AM    Status:  Signed :  Kelley Lozano MD (Physician)         [facility] [place]    Internal Medicine Progress Note - Sourav Service[RP1.1]    Main Plans for Today[RP1.2]   - Bolus IVFs to treat  hypovolemia and hypernatremia  - Advance diet to full liquid  - Cont PO vancomycin[RP1.1]     Assessment & Plan[RP1.2]   Edy Uribe is a 57 year old male with a history of stage IV bladder cancer complicated by obstructive uropathy s/p bilateral nephrostomy tubes, rectal obstruction s/p diverting ostomy who presented from nursing home with blood coming from his left nephrostomy tube and hypotension, admitted to MICU with shock (hemorrhagic vs septic).  Improved and transferred to general medicine floor 5/29.     # Hemorrhagic shock, resolved: resolved following transfusions. Bleeding spontaneously stabilized from L nephrostomy tube prior to arrival. Thought to be due to tumor invasion of local vessels. Diarrhea and maroon colored stools in colostomy prompted C diff check which was positive. Now stable, continue to monitor hemoglobin.   - Urology eval'd: given stabilization of bleeding no interventions  - GI eval'd: if patient has increased bloody ostomy output would consider endoscopy but none at this time     # Stage IV bladder cancer: Patient wants to continue to be treated aggressively per palliative care discussion but understands that he may succumb to this cancer soon and is at peace with this. Ultimately wants to return to [facility] for on-going cancer treatment but needs to complete rehabilitation first.   - Anticipate d/c back to TCU in the coming days.     # Recurrent C diff colitis: Cont PO vancomycin to complete two week course, then will continue it daily until no longer receiving chemotherapy or antibiotics per GI.      # EVERTON on CKD: Improving, likely due to ischemia in setting of above shock.     # Nighttime hypoxia  With witnessed apneic episodes, CPAP provided. Patient will need sleep eval after discharge.     # Pain Assessment:[RP1.1]  Current Pain Score 5/30/2018   Patient currently in pain? yes   Pain location Back   Pain descriptors Aching[RP1.2]   - Edy is experiencing pain due  to back muscle spasm. Pain management was discussed and the plan was created in a collaborative fashion.  Edy's response to the current recommendations: engaged  - Lidocaine patch prescribed      Diet:[RP1.1] Full Liquid Diet[RP1.2]  Fluids: IVF bolus prn  DVT Prophylaxis: Pneumatic Compression Devices  Code Status: DNR / DNI[RP1.1]    Disposition Plan[RP1.2]   Expected discharge: 2 - 3 days, recommended to prior living arrangement once hemoglobin stable and electrolytes managed with oral intake.      Entered:[RP1.1] Lakesha Villalobos 05/30/2018[RP1.2],[RP1.1] 11:14 AM[RP1.2]   Information in the above section will display in the discharge planner report.      The patient's care was discussed with the attending, Dr. Lozano, the patient and patient's bedside nurse.[RP1.1]     Lakesha Villalobos[RP1.2]  [facility]  Maroon: 4  Pager: see sticky note  Please see sticky note for cross cover information[RP1.1]    Physician Attestation  I, Kelley Lozano MD, saw this patient with the resident and agree with the resident s findings and plan of care as documented in the resident s note with my edits/additions below:    # Hypernatremia: Due to poor po free water intake and GI losses due to C.diff colitis. Mucous membranes are very dry. Encouraged him to drink more water. Will also give 1 L LR bolus.      I personally reviewed vital signs, medications, labs and imaging.    Kelley Lozano MD  Date of Service (when I saw the patient):[ES1.1] 05/30/18[ES1.2]            Interval History[RP1.2]   Overnight no acute events. Hgb remains stable.     This morning patient declined out of bed mobility, reports he feels 'scared because I've been in bed for two months'. Will work with therapists with in-bed activities, however.[RP1.1]     Physical Exam[RP1.2]   Vital Signs:[RP1.1] Temp: 97.6  F (36.4  C) Temp src: Axillary BP: 103/71   Heart Rate: 105 Resp: 14 SpO2: 97 % O2 Device: None (Room air) Oxygen Delivery: 2  LPM[RP1.2]  Weight:[RP1.1] 231 lbs .67 oz[RP1.2]  General: Chronically-ill appearing man in no apparent distress  Lungs: CTA b/l  CV: RRR, no murmurs  Abdomen: Soft, large suprapubic mass appreciated, nontender to palpation. Ostomy site is c/d/i with maroon colored liquid stool output.   Extremities: Pitting edema up to abdomen.   Neuro: A&O x 4[RP1.1]      Data[RP1.2]   Medications       sodium chloride 0.9%  500 mL Intravenous Once     sodium chloride 0.9%  1,000 mL Intravenous Once     albumin human  25 g Intravenous Once     lactated ringers  1,000 mL Intravenous Once     lidocaine  1-2 patch Transdermal Q24H     lidocaine   Transdermal Q8H     lidocaine   Transdermal Q24h     pantoprazole  40 mg Oral QAM     potassium chloride  40 mEq Oral Once     sodium chloride (PF)  3 mL Intracatheter Q8H     vancomycin  125 mg Oral 4x Daily     Data     Recent Labs  Lab 05/30/18  0341 05/29/18  1625 05/29/18  0800 05/29/18  0420  05/28/18  0607  05/28/18  0254 05/27/18  2320   WBC 10.6  --   --  12.7*  --  11.3*  < >  --  13.5*   HGB 7.9* 7.6* 7.4* 7.5*  < > 6.9*  < >  --  5.5*   MCV 87  --   --  84  --  84  < >  --  83     --   --  360  --  377  < >  --  421   INR 1.21*  --   --  1.18*  --   --   --  1.20* 1.27*   *  --   --  143  --   --   --  138 134   POTASSIUM 3.4  --   --  3.4  --   --   --  4.5 4.5   CHLORIDE 116*  --   --  111*  --   --   --  104 102   CO2 21  --   --  21  --   --   --  24 22   BUN 31*  --   --  37*  --   --   --  46* 47*   CR 1.02  --   --  1.22  --   --   --  1.55* 1.72*   ANIONGAP 11  --   --  11  --   --   --  10 10   KATHI 8.3*  --   --  8.4*  --   --   --  8.5 8.5   GLC 86  --   --  113*  --   --   --  113* 146*   ALBUMIN 1.4*  --   --  1.4*  --   --   --  1.6* 1.5*   PROTTOTAL 5.7*  --   --  5.7*  --   --   --  6.2* 5.9*   BILITOTAL 0.3  --   --  0.4  --   --   --  0.4 0.4   ALKPHOS 129  --   --  133  --   --   --  166* 159*   ALT 13  --   --  18  --   --   --  29 27   AST 24  --    --  30  --   --   --  52* 54*   TROPI  --   --   --   --   --   --   --   --  <0.015   < > = values in this interval not displayed.  No results found for this or any previous visit (from the past 24 hour(s)).[RP1.3]         Revision History        User Key Date/Time User Provider Type Action    > ES1.2 5/30/2018  4:42 PM Kelley Lozano MD Physician Sign     ES1.1 5/30/2018  4:37 PM Kelley Lozano MD Physician      RP1.3 5/30/2018 12:03 PM Lakesha Villalobos MD Physician Sign     RP1.2 5/30/2018 11:14 AM Lakesha Villalobos MD Physician      RP1.1 5/30/2018 11:13 AM Lakesha Villalobos MD Physician             Progress Notes by Lakesha Villalobos MD at 5/29/2018  7:20 PM     Author:  Lakesha Villalobos MD Service:  General Medicine Author Type:  Physician    Filed:  5/29/2018  7:43 PM Date of Service:  5/29/2018  7:20 PM Creation Time:  5/29/2018  7:20 PM    Status:  Attested :  Lakesha Villalobos MD (Physician)    Cosigner:  Kelley Lozano MD at 5/30/2018  4:37 PM        Attestation signed by Kelley Lozano MD at 5/30/2018  4:37 PM        Attestation:  Physician Attestation   I did not see the patient on this date.    Kelley Lozano MD                               Internal medicine transfer acceptance note    Mr. Uribe is a 58 y/o M with history of stage IV bladder cancer complicated by obstructive uropathy (s/p bilateral nephrostomy tubes), rectal obstruction s/p diverting ostomy, s/p 1 cycle neoadjuvant chemotherapy complicated by C diff colitis who presented from his nursing home on 5/27 with new bloody nephrostomy and ostomy output and was found to be in hemorrhagic shock. He has received transfusions and is now improved, course complicated by febrile transfusion reaction and recurrent C diff colitis for which he has been started on oral vancomycin. He is now without hemodynamically significant bleeding and is transferred to the medicine floor. His hope is to be discharged to  [facility] to continue his cancer treatment there but so far records have not yet been obtained to understand what his treatment options are.     S: Patient today feels 'great'. Reiterates his eagerness to go to [place] and keeps asking when he can go.     O:[RP1.1]   Temp: 97.6  F (36.4  C) Temp src: Tympanic BP: 103/59   Heart Rate: 99 Resp: 14 SpO2: 100 % O2 Device: BiPAP/CPAP Oxygen Delivery: 2 LPM[RP1.2]  General: Chronically-ill appearing man in no apparent distress  Lungs: CTA b/l  CV: RRR, no murmurs  Abdomen: Soft, large suprapubic mass appreciated, nontender to palpation. Ostomy site is c/d/i with minimal brown stool output.   Extremities: Pitting edema up to abdomen.   Neuro: A&O x 4    Labs reviewed.     Assessment and Plan:   # Hemorrhagic shock: resolved following transfusions. Bleeding spontaneously stabilized. Thought to be due to tumor invasion of local vessels as well as blood loss due to colitis. Now stable, continue to monitor hemoglobin.     # Stage IV bladder cancer: Patient wants to continue to be treated aggressively per palliative care discussion but understands that he may succumb to this cancer soon and is at peace with this. Ultimately wants to return to [facility] for on-going cancer treatment. Will assess his options and discuss return transfer pending this. Will need additional records from patient's oncology team.     # Recurrent C diff colitis: Cont PO vancomycin.     # [place]: Improving, likely due to ischemia in setting of above shock.       Staffed by MICU team today.     Lakesha Villalobos DO  IM PGY-3 [phone]  [place] [address] Medicine Service[RP1.1]       Revision History        User Key Date/Time User Provider Type Action    > RP1.2 5/29/2018  7:43 PM Lakesha Villalobos MD Physician Sign     RP1.1 5/29/2018  7:20 PM Lakesha Villalobos MD Physician             Progress Notes by Alfredito Sanderson at 5/30/2018  1:16 PM     Author:  Alfredito Sanderson Service:   Spiritual Health Author Type:      Filed:  5/30/2018  1:21 PM Date of Service:  5/30/2018  1:16 PM Creation Time:  5/30/2018  1:16 PM    Status:  Signed :  Alfredito Sanderson)         [facility] SERVICES  SPIRITUAL ASSESSMENT Progress Note  [facility] 4C     REFERRAL SOURCE: Patient Request via     Visited with pt who shared story of his treatment, specifically requesting prayers that his next round of chemo will set him on the road toward full healing. He also spoke of three powerful spiritual experiences that he had been given over the course of his life. The pt then asked to receive Holy Communion. After confirming with his nurse (via one of the nurses at the station), I gave the pt a fragment a host.    PLAN: Respond to future requests, if made by pt or family.    Fr. Alfredito Sanderson  [facility] Chaplain Priest miller [phone]  Pager [phone][VT1.1]       Revision History        User Key Date/Time User Provider Type Action    > VT1.1 5/30/2018  1:21 PM Alfredito Sanderson Sign            Progress Notes by Rachna Menchaca at 5/30/2018 10:53 AM     Author:  Rachna Menchaca Service:  Spiritual Health Author Type:      Filed:  5/30/2018 11:06 AM Date of Service:  5/30/2018 10:53 AM Creation Time:  5/30/2018 10:53 AM    Status:  Signed :  Rachna Menchaca)         [facility]  SPIRITUAL ASSESSMENT Progress Note  [facility] 4C     PRIMARY FOCUS:     Support for coping    REFERRAL SOURCE: Patient requested a SH encounter.     ILLNESS CIRCUMSTANCES:   Reviewed documentation. Reflective conversation shared with patient  which integrated elements of illness and family narratives.     Context of Serious Illness/Symptom(s) - Patient shared with me his journey which started with Stage IV bladder cancer and has become complicated with other infections and medical needs.     Resources for Support - Patient states that he has 14  "siblings that were all here this week.     DISTRESS:     Emotional/Spiritual/Existential Distress - Patient states that he has heard many testimonials of healing. So he states that even when everything seems to be going wrong he still is hopeful.    Worship Distress - He states that he asked God to receive the Holy Spirit and what he felt was almost more than he could handle, very powerful presence.    Social/Cultural/Economic Distress - Not discussed.     SPIRITUAL/Oriental orthodox COPING:     Christianity/Vivien - Caodaism    Spiritual Practice(s) - Patient has a very personal vivien, relies on scripture. He takes part in an outreach ministry every Tuesday.  Has received the anointing of the sick many times and asked for communion today. ( I checked with the nurse and he is on a liquid diet and can not have communion for now, she will page me if this changes. )  Patient asked for prayer.  I introduced  and offered a  visit and he said \"yes\".     Emotional/Relational/Existential Connections - Patient asked for prayer that he can return to the [facility] so he can be closer to family and friends.    GOALS OF CARE:    Goals of Care - Patient is still hoping that he can get more treatment for his bladder cancer, but is also trying to endure what comes his way with vivien and trust.     Meaning/Sense-Making - He feels that this illness is a test for him and he hopes to lead others to God through this illness.    PLAN: I will let the  know of the request.  I gave my page number to the staff in case he is able to take communion.  I will let the unit  know of the care provided and that the patient would be open to   support during this hospitalization.    Rachna Menchaca  Staff   Pager [phone][MS1.1]     Revision History        User Key Date/Time User Provider Type Action    > MS1.1 5/30/2018 11:06 AM Rachna Menchaca Sign            Progress Notes by Eboni Izaguirre APRN " CNP at 5/30/2018  9:28 AM     Author:  Eboni Izaguirre APRN CNP Service:  Palliative Author Type:  Nurse Practitioner    Filed:  5/30/2018  9:30 AM Date of Service:  5/30/2018  9:28 AM Creation Time:  5/30/2018  9:28 AM    Status:  Signed :  Eboni Izaguirre APRN CNP (Nurse Practitioner)         [facility], [place]   Palliative Care Daily Progress Note          Palliative Care was consulted for additional support and goals of care. At this time goals are clear and patient prefers emotional support from [facility], so we will sign off. Please feel free to re-consult us if we can be helpful in the future. Thank you for the opportunity to be involved in the care of this patient.    WILFRED Brown CNP  Palliative Care Consult Team  Pager: [phone][JS1.1]       Revision History        User Key Date/Time User Provider Type Action    > JS1.1 5/30/2018  9:30 AM Eboni Izaguirre APRN CNP Nurse Practitioner Sign            Progress Notes by Perlman, David Morris, MD at 5/29/2018  7:04 AM     Author:  Perlman, David Morris, MD Service:  Medical ICU Author Type:  Physician    Filed:  5/29/2018  6:18 PM Date of Service:  5/29/2018  7:04 AM Creation Time:  5/29/2018  7:04 AM    Status:  Signed :  Perlman, David Morris, MD (Physician)         [facility]      MICU Progress Note  Edy Uribe MRN: [id number]  1960  Date of Admission:5/27/2018  Primary care provider: No Ref-Primary, Physician      Assessment and Plan:     Edy Uribe is a 57 year old male with a history of stage IV bladder cancer complicated by obstructive uropathy s/p bilateral nephrostomy tubes (currently receiving neoadjuvant chemo in hopes of surgical treatment), rectal obstruction s/p diverting ostomy who presented from nursing home with blood coming from his left nephrostomy tube and hypotension, admitted to MICU with shock (hemorrhagic vs septic).[CB1.1]  Now  off of pressors and hemodynamically stable without evidence of ongoing bleeding.[CB1.2]     Changes Today:[CB1.3]  - 500cc NS bolus[CB1.2]  - Check Hgb q[CB1.3]12 hour  - Transfer to general medicine[CB1.2]   - Continue PO vancomycin for recurrent Cdiff infection[CB1.3]    PLAN:  ===NEURO===  # AMS  Patient protecting his airway currently. ABG without hypercarbia. Likely secondary to shock[CB1.1],[CB1.2] resuscitat[CB1.1]ed[CB1.2] as discussed below. He does not have focal neuro deficits. Improving on 5/29.   - Monitor     ===CARDIOVASCULAR===  # Shock secondary to hemorrhage vs sepsis[CB1.1]- resolving[CB1.2]   - Resuscitated with 2L IVF and 12.5g albumin, along with 3u pRBC on 5/28[CB1.1]  Hgb stable overnight[CB1.2] (one unit stopped early given febrile reaction)[CB1.4]  - Levophed to maintain MAP>65 (off pressors since 5/28)  - GI and urology aware, appreciate recs    ===PULMONARY===  # SAMUEL  Desats to SpO2[CB1.1] 6[CB1.2]0% for very brief periods of time[CB1.1] during[CB1.2] witnessed apneic events[CB1.1] while sleeping[CB1.2] per nursing. Placed o[CB1.1]n CPAP[CB1.2] overnight. Patient not on home CPAP per family[CB1.1]  - recommend CPAP overnight for witnessed sleep apnea[CB1.2]     # Tachypnea  Likely 2/2 sepsis as discussed below. Will monitor closely.    ===GASTROINTESTINAL===  # Bloody ostomy output   Maroon colored output from ostomy. May be secondary to recurrent Cdiff infection. Ddx is broad and includes direct tumor invasion of surrounding structures, vascular involvement, stomal irritation, stomal varices.   - GI consulted, appreciate recs  - Transfuse to Hgb >7  - Q[CB1.1]12[CB1.2]H Hgb  - Protonix gtt[CB1.1] per GI[CB1.2]     # Recent history of clostridium difficile colitis  S/p prolonged course of PO vancomycin at OSH[CB1.1] for recurrent Cdiff infection since March 2018.[CB1.3] Currently, he is ~2 weeks s/p completion fo vancomycin taper which is when relapse chances are greatest. He was again  found to be Cdiff positive on 5/28.   - Enteric precautions  - C diff[CB1.1] positive: Resumed PO vancomycin 125 mg QID (5/28- ) with plan for 4 week course, followed by decreased dose of 125mg daily indefinitely (as long as he is undergoing cancer treatments and continues to have urinary infections[CB1.3]) per GI[CB1.2]     ===RENAL===  # Chronic kidney disease related to obstructive uropathy  Baseline creatinine is 0.6-0.9. Suspect worsening in the setting of anemia and shock.Creatinine 1.22 today, down-trending.   - Strict I/Os  - Blood, fluid resuscitation as discussed above    ===HEME/ONC===  # Acute on chronic anemia with concern for hemorrhagic shock  Baseline hemoglobins 6-8 at recent admission in [place], 7.5 at discharge. He reported bloody output from his nephrostomy as well as from his ostomy in the past several days. Currently, having pink tinged output from left nephrostomy tube and maroon output from ostomy.   - Transfuse to Hgb >7 . 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 Unfortunately, we do not currently have these records. Per report, recently received neoadjuvant chemo in hopes of surgery.  Recently received neoadjuvant chemotherapy (one cycle of carboplatin and gemcitabine) with hopes of returning to [facility] for possible immunotherapy.   - Consider heme/onc consultation, will work on obtaining outside records   - Previously followed with palliative care at OSH for symptom management and spiritual support, consulted here, appreciate recs[CB1.1]    # Febrile non-hemolytic transfusion reaction  Temperature elevation by 5.5F following  first blood transfusion, and 1.2F during third transfusion. Discussed with blood bank MD.  - Monitor closely during transfusions if additional are needed. Premedicate with tylenol[CB1.4]     ===ENDOCRINE===  No acute issues. FSG q4 hr. Monitor for hypoglycemia[CB1.1].[CB1.2]     ===INFECTIOUS DISEASE===  # S[CB1.1]hock, possible sepsis - improved[CB1.2]   Fevers as high as 103 recorded during blood transfusions.[CB1.1] Likely[CB1.2]  secondary to transfusion reaction[CB1.1]. Ddx included sepsis, although patient appears clinically improved s/p resolution of hemorrhage and cultures show NGTD. P[CB1.2]atient[CB1.1] does have[CB1.2] significant risk of infection in the setting of large, invasive bladder cancer, s/p ostomy and bilateral nephrostomy tubes. CXR without focal consolidation.   - Lactic acid 2.4 , procalcitonin 11.5   - Blood cultures, urine culture pending. NGTD  -[CB1.1] Discontinue antibiotics today, consider restarting zosyn if clinically decompensates[CB1.2]     ===SKIN/MSK===  # L5/S1 osteomyelitis vs tumor invasion  - Monitor closely     Prophylaxis:  DVT: SCDs given bleeding   GI: Pantoprazole gtt  Family:  Updated sister Yesy over the phone  Disposition:[CB1.1] Transfer to medicine[CB1.2]     Code Status: DNR/DNI, discussed with patient and family (incuding sister Yesy)    Patient was seen and discussed with attending physician Dr. Perlman, who agrees with above assessment and plan.    Queta Carmona MD  Internal Medicine, PGY1  [id number]        Interval History:   Overnight,[CB1.1] patient was hemodynamically stable and weaned off of pressors. He has not required additional blood products. He is comfortable this morning and denies pain.[CB1.2]          Past Medical History:   Medical History reviewed.   1. 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 Rectal obstruction related to bladder  "mass and intrinsic compression, status post diverting colostomy  4. Chronic anemia  5. L5/S1 osteomyelitis vs tumor invasion  6. Recent history of clostridium difficile colitis  7. SAMUEL         Past Surgical History:   Surgical History reviewed.   1. Bilateral nephrostomy tubes   2. TURP for prostate in 2017   3. Diverting colostomy for rectal obstruction 2/2 bladder mass 2/1/18         Allergies:   No Known Allergies   Possible transfusion reaction to pRBC 5/28[CB1.1] (fevers)[CB1.2]         Medications:   Medications Reviewed.   Current Outpatient Prescriptions   Medication Sig     Acetaminophen (TYLENOL PO) Take 500 mg by mouth every 8 hours as needed for mild pain or fever     ascorbic acid (VITAMIN C) 500 MG CPCR CR capsule Take 500 mg by mouth daily     ferrous sulfate (IRON) 325 (65 Fe) MG tablet Take by mouth daily (with breakfast)     lidocaine (LIDODERM) 5 % Patch Place 1 patch onto the skin every 24 hours     MAGNESIUM OXIDE PO Take 400 mg by mouth daily     METOPROLOL TARTRATE PO Take 12.5 mg by mouth 2 times daily     OXYCODONE HCL PO Take 5 mg by mouth every 3 hours as needed     Prochlorperazine Maleate (COMPAZINE PO) Take 10 mg by mouth every 6 hours as needed for nausea           Physical Exam:   Vitals were reviewed.  Blood pressure 106/66, pulse 114, temperature 97.4  F (36.3  C), temperature source Axillary, resp. rate 14, height 1.885 m (6' 2.2\"), weight 104.6 kg (230 lb 9.6 oz), SpO2 100 %.    General: Oriented to person and time only, appears lethargic, pale   HEENT: MMM,  EOM intact  CV: tachycardic, normal S1S2, no murmur/rubs  Resp: Clear to auscultation bilaterally, no wheezes  Abd: Large, firm suprapubic mass, nontender to palpation. Colostomy with maroon output. Bilateral nephrostomy tubes- right draining yellow urine and left with pink tinged[CB1.1] urine[CB1.2]. Quiet bowel sounds.   Extremities: Warm and well perfused, palpable pulses, no LE edema  Neuro:[CB1.1] alert and oriented, " moving all extremities[CB1.2]   Skin: Not jaundiced, no rash, no ecchymoses appreciated, pale          Data:   I/O last 3 completed shifts:  In: 5941.48 [P.O.:362.5; I.V.:4778.98; IV Piggyback:500]  Out: 2465 [Urine:2150; Stool:315]    ROUTINE LABS (Last four results)  CMP    Recent Labs  Lab 05/29/18  0420 05/28/18  0254 05/27/18  2320    138 134   POTASSIUM 3.4 4.5 4.5   CHLORIDE 111* 104 102   CO2 21 24 22   ANIONGAP 11 10 10   * 113* 146*   BUN 37* 46* 47*   CR 1.22 1.55* 1.72*   GFRESTIMATED 61 46* 41*   GFRESTBLACK 73 56* 49*   KATHI 8.4* 8.5 8.5   MAG  --   --  2.5*   PROTTOTAL 5.7* 6.2* 5.9*   ALBUMIN 1.4* 1.6* 1.5*   BILITOTAL 0.4 0.4 0.4   ALKPHOS 133 166* 159*   AST 30 52* 54*   ALT 18 29 27     CBC    Recent Labs  Lab 05/29/18  0420 05/28/18  2356 05/28/18  2057 05/28/18  1628  05/28/18  0607 05/28/18  0354 05/27/18  2320   WBC 12.7*  --   --   --   --  11.3* 12.0* 13.5*   RBC 2.91*  --   --   --   --  2.71* 2.62* 2.28*   HGB 7.5* 7.6* 7.6* 7.2*  < > 6.9* 6.5* 5.5*   HCT 24.4*  --   --   --   --  22.7* 21.7* 18.8*   MCV 84  --   --   --   --  84 83 83   MCH 25.8*  --   --   --   --  25.5* 24.8* 24.1*   MCHC 30.7*  --   --   --   --  30.4* 30.0* 29.3*   RDW 16.8*  --   --   --   --  16.7* 16.9* 17.1*     --   --   --   --  377 396 421   < > = values in this interval not displayed.  INR    Recent Labs  Lab 05/29/18  0420 05/28/18  0254 05/27/18  2320   INR 1.18* 1.20* 1.27*     Arterial Blood Gas    Recent Labs  Lab 05/28/18  0926 05/28/18  0258   PH 7.39 7.40   PCO2 35 35   PO2 90 76*   HCO3 21 21   O2PER 2L 2L     Imaging:  CXR 5/28: low lung volumes, clear lungs    CT A/P 5/28/17 per prelim report:       1. Examination for malignancy of bleeding is limited by the lack of  intravenous contrast.  2a. Markedly abnormal appearance of the urinary bladder containing  soft tissue density as well as foci of air which is likely due to a  combination of bladder malignancy and blood clot. The  urinary bladder  is significantly distended up to 26.2 cm.  2b. Confluent with the bladder mass posteriorly is a soft tissue mass  measuring 9.7 x 4.5 x 13.4 cm, which appears contiguous with the  sigmoid colon and suspicious for invasion of the sigmoid colon. There  is a diverting colostomy in the left upper quadrant at the level of  the distal transverse colon.  2c. Erosion of the L5 vertebral body which is suspicious for  metastasis or local invasion of the L5 vertebral body from the urinary  bladder mass.  3. Hyperdense stool in the right colon and remaining transverse colon,  which is nonspecific and may be due to inspissated stool, excretion of  prior contrast, blood products, or a combination of both.  4. Bilateral percutaneous nephrostomy tubes with persistent moderate  hydronephrosis.  5. Cholelithiasis without evidence of cholecystitis.[CB1.1]    Attending Note:    The patient was seen and examined by me and discussed at length with the resident. I have personally reviewed all labs, vital signs, imaging and culture results from the last 24 hours  I have read and agree with the resident note from today which reflects our joint findings, assessment and plan.    David Perlman, M.D.    Pulmonary, Allergy and Critical Care Medicine  [facility][DP1.1]         Revision History        User Key Date/Time User Provider Type Action    > DP1.1 5/29/2018  6:18 PM Perlman, David Morris, MD Physician Sign     CB1.4 5/29/2018  2:41 PM Queta Carmona MD Resident Sign     CB1.2 5/29/2018  1:03 PM Queta Carmona MD Resident Sign     CB1.3 5/29/2018  8:17 AM Queta Carmona MD Resident      CB1.1 5/29/2018  7:04 AM Queta Carmona MD Resident             Progress Notes by Perlman, David Morris, MD at 5/28/2018  8:11 AM     Author:  Perlman, David Morris, MD Service:  Medical ICU Author Type:  Physician    Filed:  5/29/2018  6:17 PM Date of  Service:  5/28/2018  8:11 AM Creation Time:  5/28/2018  8:11 AM    Status:  Signed :  Perlman, David Morris, MD (Physician)         [facility]      MICU Progress Note  Edy Uribe MRN: [id number]  1960  Date of Admission:5/27/2018  Primary care provider: No Ref-Primary, Physician      Assessment and Plan:     Edy Uribe is a 57 year old male with a history of stage IV bladder cancer complicated by obstructive uropathy s/p bilateral nephrostomy tube[CB1.1]s (currently receiving neoadjuvant chemo in hopes of surgical treatment)[CB1.2], rectal obstruction s/p diverting ostomy who presented[CB1.1] from nursing home with[CB1.2] blood coming from his left nephrostomy tube[CB1.1] and hypotension[CB1.2], admitted to MICU with shock (hemorrhagic vs septic) requiring pressor support this morning[CB1.3].[CB1.2]     PLAN:  ===NEURO===  # AMS  Patient protecting his airway[CB1.1] currently.[CB1.3] ABG without hypercarbia[CB1.1] overnight[CB1.3]. Likely secondary to shock and will resuscitate[CB1.1] as discussed below.[CB1.3] He does not have focal neuro deficits.   - Monitor[CB1.1]   - Repeat ABG this morning[CB1.3]     ===CARDIOVASCULAR===  # Shock secondary to hemorrhage[CB1.1] vs[CB1.3] sepsis  - Resuscitat[CB1.1]ed with 1.5L IVF overnight and 12.5g albumin, along with ~2u PRBC (concern for transfusion reaction as dicussed below).   - Will give additional 500cc LR bolus  - Levophed to maintain MAP>65[CB1.3]  - Vancomycin, zosyn   - GI aware, will see in AM  - Urology aware, will see in AM    ===PULMONARY===  # SAMUEL  Patient not on CPAP per family[CB1.1]    # Tachypnea  Likely 2/2 sepsis as discussed below. Will monitor closely. DNI.[CB1.3]     ===GASTROINTESTINAL===  #LGIB[CB1.1]   Maroon colored output from ostomy. May be secondary to invasive tumor burden.[CB1.3]   - Transfuse >7  - Q4H Hgb  - Protonix gtt[CB1.1]  - GI consulted as dicussed below[CB1.3]     # Recent history of  clostridium difficile colitis  - Enteric precautions  - C diff testing given reported diarrhea    ===RENAL===  # Chronic kidney disease related to obstructive uropathy  Baseline creatinine is 0.6-0.9. Suspect worsening in the setting of anemia and shock.  - Strict I/Os  - Blood, fluid[CB1.1] resuscitation as discussed above[CB1.3]    ===HEME/ONC===  # Acute on chronic anemia with[CB1.1] concern for[CB1.4] hemorrhagic shock  Baseline hemoglobins 6-8 at recent admission in [place], 7.5 at discharge. He report[CB1.1]ed[CB1.4] bloody output from his nephrostomy as well as from his ostomy in the past several days.[CB1.1] Currently, having pink tinged output from left nephrostomy tube and maroon output from ostomy.[CB1.4]   - Transfuse to Hgb >7[CB1.1] . 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 Unfortunately, we do not currently have these records. Per report, recently received neoadjuvant chemo in hopes of surgery.[CB1.4]   - Consider heme/onc consultation[CB1.1], will work on obtaining outside records[CB1.4]     ===ENDOCRINE===  No acute issues.[CB1.1] FSG q4 hr.[CB1.4] Monitor for hypoglycemia in the setting o[CB1.1]f[CB1.4] NPO status.     ===INFECTIOUS DISEASE===  # Sepsis[CB1.1]   Fevers as high as 103 recorded during blood transfusions.[CB1.2] May be secondary to transfusion reaction vs sepsis. Source of sepsis currently unclear, although patient has significant risk of infection in the setting of large, invasive bladder cancer, s/p ostomy and bilateral nephrostomy tubes. CXR without focal consolidation.   - Lactic acid 2.4 , procalcitonin  11.5[CB1.4]   - Blood cultures[CB1.1], urine culture pending[CB1.4]  -[CB1.1] Continue IV[CB1.4] Vanco[CB1.1]mycin and[CB1.4] zosyn       ===SKIN/MSK===  # L5/S1 osteomyelitis vs tumor invasion  - Monitor[CB1.1] closely[CB1.4]     Prophylaxis:  DVT: SCDs given bleeding   GI: Pantoprazole gtt  Family:  Updated sister Yesy over the phone  Disposition: Critically Ill    Code Status: DNR/DNI, discussed with family Yesy    Patient was seen and discussed with attending physician [CB1.1] Perlman[CB1.4], who agrees with above assessment and plan.[CB1.1]    Queta Carmona MD  Internal Medicine, PGY1  [id number]        Interval History[CB1.4]:[CB1.1]   Overnight, patient received 1.5L IVF and 12.5g albumin, along with ~2 units pRBCs. Patient febrile during transfusions and 3rd unit was held. Patient becoming more hypotensive overnight, requiring central line placement and initiation of levophed. More lethargic this morning. Patient's sister updated and on her way in.[CB1.4]          Past Medical History:   Medical History reviewed.   1. Bladder car[CB1.1]c[CB1.4]inoma with sarcomatoid changes and extensive rhabdomyoblastic differentiation  2. 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 Rectal obstruction related to bladder mass and intrinsic compression, status post diverting colostomy  4. Chronic anemia  5. L5/S1 osteomyelitis vs tumor invasion  6. Recent history of clostridium difficile colitis  7. SAMUEL         Past Surgical History:   Surgical History reviewed.   1. Bilateral nephrostomy tubes   2. TURP for prostate in 2017   3. Diverting colostomy for rectal obstruction 2/2 bladder mass 2/1/18         Allergies:   No Known Allergies[CB1.1]   Possible transfusion reaction to pRBC 5/28[CB1.4]         Medications:   Medications Reviewed.   Current Outpatient Prescriptions   Medication Sig     Acetaminophen (TYLENOL PO) Take 500 mg by mouth every 8 hours as needed for mild pain or fever     ascorbic acid  "(VITAMIN C) 500 MG CPCR CR capsule Take 500 mg by mouth daily     ferrous sulfate (IRON) 325 (65 Fe) MG tablet Take by mouth daily (with breakfast)     lidocaine (LIDODERM) 5 % Patch Place 1 patch onto the skin every 24 hours     MAGNESIUM OXIDE PO Take 400 mg by mouth daily     METOPROLOL TARTRATE PO Take 12.5 mg by mouth 2 times daily     OXYCODONE HCL PO Take 5 mg by mouth every 3 hours as needed     Prochlorperazine Maleate (COMPAZINE PO) Take 10 mg by mouth every 6 hours as needed for nausea           Physical Exam:   Vitals were reviewed.[CB1.1]  Blood pressure (!) 86/50, pulse 114, temperature 101.2  F (38.4  C), temperature source Axillary, resp. rate 18, height 1.885 m (6' 2.2\"), weight 104.9 kg (231 lb 4.2 oz), SpO2 100 %.[CB1.5]    General: Oriented to person[CB1.1] and time only, appears lethargic, pale[CB1.4]   HEENT: MMM,  EOM intact  CV:[CB1.1] tachycardic[CB1.4], normal S1S2, no murmur[CB1.1]/rubs[CB1.4]  Resp: Clear to auscultation bilaterally, no wheezes, rhonchi[CB1.1], tachypnic on nasal cannula[CB1.4]   Abd: Large, firm suprapubic mass[CB1.1], nontender to palpation. Colostomy with maroon output. Bilateral nephrostomy tubes- right draining yellow urine and left with pink tinged output. Quiet bowel sounds.[CB1.4]   Extremities: Warm[CB1.1] and well perfused,[CB1.4] palpable pulses, no[CB1.1] LE[CB1.4] edema  Neuro:[CB1.1] A&Ox2, repeatedly asking for an \"orange popsicle\"[CB1.4]   Skin: Not jaundiced, no rash, no ecchymoses[CB1.1] appreciated, pale[CB1.4]          Data:   I/O last 3 completed shifts:  In: 3290.08 [I.V.:1163; IV Piggyback:1500]  Out: 775 [Urine:775]    ROUTINE LABS (Last four results)  CMP    Recent Labs  Lab 05/28/18  0254 05/27/18  2320    134   POTASSIUM 4.5 4.5   CHLORIDE 104 102   CO2 24 22   ANIONGAP 10 10   * 146*   BUN 46* 47*   CR 1.55* 1.72*   GFRESTIMATED 46* 41*   GFRESTBLACK 56* 49*   KATHI 8.5 8.5   MAG  --  2.5*   PROTTOTAL 6.2* 5.9*   ALBUMIN 1.6* 1.5* "   BILITOTAL 0.4 0.4   ALKPHOS 166* 159*   AST 52* 54*   ALT 29 27     CBC    Recent Labs  Lab 05/28/18  0607 05/28/18  0354 05/27/18  2320   WBC 11.3* 12.0* 13.5*   RBC 2.71* 2.62* 2.28*   HGB 6.9* 6.5* 5.5*   HCT 22.7* 21.7* 18.8*   MCV 84 83 83   MCH 25.5* 24.8* 24.1*   MCHC 30.4* 30.0* 29.3*   RDW 16.7* 16.9* 17.1*    396 421     INR    Recent Labs  Lab 05/28/18  0254 05/27/18  2320   INR 1.20* 1.27*     Arterial Blood Gas    Recent Labs  Lab 05/28/18  0258   PH 7.40   PCO2 35   PO2 76*   HCO3 21   O2PER 2L     Imaging:  CXR 5/28: low lung[CB1.1] volumes, clear lungs    CT A/P 5/28/17 per prelim report:       1. Examination for malignancy of bleeding is limited by the lack of  intravenous contrast.  2a. Markedly abnormal appearance of the urinary bladder containing  soft tissue density as well as foci of air which is likely due to a  combination of bladder malignancy and blood clot. The urinary bladder  is significantly distended up to 26.2 cm.  2b. Confluent with the bladder mass posteriorly is a soft tissue mass  measuring 9.7 x 4.5 x 13.4 cm, which appears contiguous with the  sigmoid colon and suspicious for invasion of the sigmoid colon. There  is a diverting colostomy in the left upper quadrant at the level of  the distal transverse colon.  2c. Erosion of the L5 vertebral body which is suspicious for  metastasis or local invasion of the L5 vertebral body from the urinary  bladder mass.  3. Hyperdense stool in the right colon and remaining transverse colon,  which is nonspecific and may be due to inspissated stool, excretion of  prior contrast, blood products, or a combination of both.  4. Bilateral percutaneous nephrostomy tubes with persistent moderate  hydronephrosis.  5. Cholelithiasis without evidence of cholecystitis.[CB1.4]    Attending Note:    The patient was seen and examined by me and discussed at length with the resident. I have personally reviewed all labs, vital signs, imaging and  culture results from the last 24 hours  I have read and agree with the resident note from today which reflects our joint findings, assessment and plan.    David Perlman, M.D.    Pulmonary, Allergy and Critical Care Medicine  [facility][DP1.1]             Revision History        User Key Date/Time User Provider Type Action    > DP1.1 5/29/2018  6:17 PM Perlman, David Morris, MD Physician Sign     CB1.3 5/28/2018  9:37 AM Queta Carmona MD Resident Sign     CB1.5 5/28/2018  9:04 AM Queta Carmona MD Resident      CB1.4 5/28/2018  9:00 AM Queta Carmona MD Resident      CB1.2 5/28/2018  8:30 AM Queta Carmona MD Resident      CB1.1 5/28/2018  8:11 AM Queta Carmona MD Resident             Progress Notes by Adams Cox MD at 5/29/2018 12:22 PM     Author:  Adams Cox MD Service:  Gastroenterology Author Type:  Resident    Filed:  5/29/2018  4:58 PM Date of Service:  5/29/2018 12:22 PM Creation Time:  5/29/2018 12:22 PM    Status:  Signed :  Adams Cox MD (Resident)         GI Progress Note  5/29/2018     S:[TS1.1] Small amount of blood tinged ostomy output throughout the day. No abdominal pain. 4 point ROS otherwise negative.[TS1.2]    O:  B/P: 85/54, T: 97.3, P: 114, R: 12   Gen:[TS1.1] NAD[TS1.2]  HEENT:[TS1.1] MMM[TS1.2]  Neck:[TS1.1] supple[TS1.2]  CV:[TS1.1] tachycardic[TS1.2]  Pulm:[TS1.1] non labored[TS1.2]  Abd:[TS1.1] soft, non tender, well healed surgical scar, ostomy appliance removed and colostomy inspected- scant red tinged fluid on external mucosa and anastomosis, non tender digital inspection with brownish stool and no red blood.[TS1.2]   Skin:[TS1.1] WWP[TS1.2]  Neuro:[TS1.1] AAOx3[TS1.2]    Labs and imaging personally reviewed.   Lab Results   Component Value Date    WBC 12.7 05/29/2018     Lab Results   Component Value Date    RBC 2.91 05/29/2018     Lab Results    Component Value Date    HGB 7.4 05/29/2018     Lab Results   Component Value Date    HCT 24.4 05/29/2018     No components found for: MCT  Lab Results   Component Value Date    MCV 84 05/29/2018     Lab Results   Component Value Date    MCH 25.8 05/29/2018     Lab Results   Component Value Date    MCHC 30.7 05/29/2018     Lab Results   Component Value Date    RDW 16.8 05/29/2018     Lab Results   Component Value Date     05/29/2018       Assessment and recommendations:[TS1.1]   57 year old male with extensive primary bladder carcinoma c/b extrinsic compression on rectum causing obstruction s/p emergent diverting colostomy, in addition to obstructive uropathy (s/p B/L PNTs) - currently on neoadjuvant chemotherapy (one cycle of carboplatin and gemcitabine- held due to overall functional decline as well as C Diff infection), hx of C Diff colitis requiring a prolonged course of PO vancomycin, L5/S1 OM, SAMUEL who was initially admitted to the hospital for c/o bloody output from the nephrostomy tube.     C diff returned positive.     Output from colostomy is reddish[TS1.2] and may very well be likely from mucosal and/or peristomal irritation (supported by brown stool on digital examination). If he were to have evidence of more brisk bleeding, we would re-evaluate his stoma for edgar-stomal vessels vs intraluminal bleeding.[TS1.3]    [TS1.2]  - PPI QD  - vancomycin 125mg QID x2 weeks, then daily thereafter until no longer receiving antibiotics or chemotherapy[TS1.3]  - Will consider a endoscopic evaluation (EGD +/- colonoscopy) if patient has convincing evidence of ongoing GI bleeding leading to hemodynamic compromise[TS1.2]    Discussed with attending, Dr Garcia.     Adams Cox MD  GI Fellow  [phone][TS1.3]      Revision History        User Key Date/Time User Provider Type Action    > TS1.3 5/29/2018  4:58 PM Adams Cox MD Resident Sign     TS1.2 5/29/2018  3:22 PM Adams Cox MD  Resident      TS1.1 5/29/2018 12:22 PM Adams Cox MD Resident             Progress Notes by Cammie Medina OT at 5/29/2018 11:15 AM     Author:  Cammie Medina OT Service:  (none) Author Type:  Occupational Therapist    Filed:  5/29/2018 11:15 AM Date of Service:  5/29/2018 11:15 AM Creation Time:  5/29/2018 11:15 AM    Status:  Signed :  Cammie Medina OT (Occupational Therapist)          05/29/18 1105   Quick Adds   Type of Visit Initial Occupational Therapy Evaluation   Living Environment   Lives With significant other;other (see comments)   Living Arrangements house   Home Accessibility stairs to enter home   Number of Stairs to Enter Home 25   Number of Stairs Within Home 0   Transportation Available family or friend will provide;car   Living Environment Comment Pt was living in a house with his girlfriend and her father at baseline. Has been in/out of hospitals/rehab facilities for the last few months.   Self-Care   Dominant Hand right   Usual Activity Tolerance good   Current Activity Tolerance poor   Equipment Currently Used at Home none   Activity/Exercise/Self-Care Comment Pt shrugs shoulder when asked abotu prior activity level. Pt has been using a FWW most recently while in facilities for functional mobility and could walk ~300 feet at a time.   Functional Level Prior   Ambulation 0-->independent   Transferring 0-->independent   Toileting 0-->independent   Bathing 0-->independent   Dressing 0-->independent   Eating 0-->independent   Communication 0-->understands/communicates without difficulty   Swallowing 0-->swallows foods/liquids without difficulty   Cognition 0 - no cognition issues reported   Which of the above functional risks had a recent onset or change? ambulation;transferring;toileting;bathing;dressing;cognition   Prior Functional Level Comment Pt was IND with ADL prior to recent hospitalizations.   General Information   Onset of Illness/Injury or Date of Surgery - Date  05/27/18   Referring Physician Ludy Bower MD   Patient/Family Goals Statement Increase strength   Additional Occupational Profile Info/Pertinent History of Current Problem Edy Uribe is a 57 year old male with a history of stage IV bladder cancer complicated by obstructive uropathy s/p bilateral nephrostomy tubes (currently receiving neoadjuvant chemo in hopes of surgical treatment), rectal obstruction s/p diverting ostomy who presented from nursing home with blood coming from his left nephrostomy tube and hypotension, admitted to MICU with shock (hemorrhagic vs septic) requiring pressor support this morning.    Precautions/Limitations fall precautions;oxygen therapy device and L/min   General Observations Pt supine in bed upon arrival, agreeable to therapy.   Cognitive Status Examination   Orientation orientation to person, place and time   Level of Consciousness alert   Able to Follow Commands mild impairment   Cognitive Comment No cog issues reported. Slow processing speed noted.   Visual Perception   Visual Perception No deficits were identified   Sensory Examination   Sensory Comments BLE neuropathy at baseline.    Pain Assessment   Patient Currently in Pain No   Range of Motion (ROM)   ROM Quick Adds No deficits were identified   Strength   Strength Comments Generalized weakness   Instrumental Activities of Daily Living (IADL)   IADL Comments Pt receiving assist with all IADL at facility.   Activities of Daily Living Analysis   Impairments Contributing to Impaired Activities of Daily Living balance impaired;cognition impaired;fear and anxiety;strength decreased;sensation decreased   General Therapy Interventions   Planned Therapy Interventions ADL retraining;IADL retraining;cognition;bed mobility training;strengthening;transfer training;progressive activity/exercise;home program guidelines   Clinical Impression   Criteria for Skilled Therapeutic Interventions Met yes, treatment indicated   OT  "Diagnosis OT order for deconditioning   Influenced by the following impairments pain, activity tolerance, strength   Assessment of Occupational Performance 3-5 Performance Deficits   Identified Performance Deficits bathing, dressing, social skills   Clinical Decision Making (Complexity) Low complexity   Therapy Frequency 5 times/wk   Predicted Duration of Therapy Intervention (days/wks) 2 weeks   Anticipated Equipment Needs at Discharge (TBD with further therapy)   Anticipated Discharge Disposition Transitional Care Facility;Long Term Care Facility   Risks and Benefits of Treatment have been explained. Yes   Patient, Family & other staff in agreement with plan of care Yes   [facility] TM \"6 Clicks\"   2016, Trustees of [facility], under license to [employer].  All rights reserved.   6 Clicks Short Forms Daily Activity Inpatient Short Form   [facility]  \"6 Clicks\" Daily Activity Inpatient Short Form   1. Putting on and taking off regular lower body clothing? 2 - A Lot   2. Bathing (including washing, rinsing, drying)? 1 - Total   3. Toileting, which includes using toilet, bedpan or urinal? 1 - Total   4. Putting on and taking off regular upper body clothing? 2 - A Lot   5. Taking care of personal grooming such as brushing teeth? 3 - A Little   6. Eating meals? 2 - A Lot   Daily Activity Raw Score (Score out of 24.Lower scores equate to lower levels of function) 11   Total Evaluation Time   Total Evaluation Time (Minutes) 8[SS1.1]        Revision History        User Key Date/Time User Provider Type Action    > SS1.1 5/29/2018 11:15 AM Cammie Medina OT Occupational Therapist Sign            ED Provider Notes by Christopher Vincent MD at 5/27/2018 10:56 PM     Author:  Christopher Vincent MD Service:  Emergency Medicine Author Type:  Physician    Filed:  5/28/2018  8:45 AM Date of Service:  5/27/2018 10:56 PM Creation Time:  5/27/2018 10:58 PM    Status:  Signed :  Carlton, " Christopher Lynch MD (Physician)           History[LM1.1]     Chief Complaint   Patient presents with     Hypotension     Hematuria[KM1.1]     HPI  Edy Uribe is a 57 year old male with a history of[LM1.1] stage IV bladder cancer, obstructive uropathy status post 2 nephrostomy tubes in place. Patient is currently getting neoadjuvant chemotherapy in the hopes of surgical treatment of his bladder cancer. He was recently in [facility] where he was diagnosed with C. difficile colitis and was placed on oral vancomycin. His nephrostomy tubes were replaced during that stay, which was approximately[LM1.2] 2 months[LM1.3] ago[LM1.2] before being transferred to a nursing home. He was admitted again to the same hospital on 4/18/18 because his nephrostomy tubes were not draining.[LM1.3] Output from the nephrostomy tubes were noted to decrease over the[LM1.2] span of[LM1.3] 1-2 days.[LM1.2] His creatinine was noted to be elevated to 4.35 but his renal function was previously known to be normal. He had a nephrostomy replacement which improved his creatinine. He had a UTI which was also treated while there. He had a CT scan which indicated a distended bladder with increased density material likely clot although there could be a component of tumor. Patient was discharged to his current nursing home on 5/22/18 which is closer to his family who live in the [place] metropolitan area, although he is from [place].       Patient[LM1.3] presents to the Emergency Department for evaluation[LM1.1] from [facility] b[LM1.2]y[LM1.3] ambulance after staff noticed blood coming from his[LM1.2] left nephrostomy tube[LM1.1] around 1:00 pm today. Upon EMS arrival, patient was hypotensive with blood pressures about 84/52. He had a blood glucose of 155 and heart rate of 120 bpm per EMS. While en route to the ED he was given 600 units of fluid. EMS also note that patient was given 500 mg of oxycodone by nursing  home staff just prior to leaving around 22:30 for his chronic back pain. Patient himself denies previous history of[LM1.2] bleeding problems[LM1.1].[LM1.2]      I have reviewed the Medications, Allergies, Past Medical and Surgical History, and Social History in the Epic system.[LM1.1]    PAST MEDICAL HISTORY:[LM1.3] No past medical history on file.[KM1.1]    PAST SURGICAL HISTORY:[LM1.3] No past surgical history on file.[KM1.1]    FAMILY HISTORY:[LM1.3] No family history on file.[KM1.1]    SOCIAL HISTORY:[LM1.3]   Social History   Substance Use Topics     Smoking status: Not on file     Smokeless tobacco: Not on file     Alcohol use Not on file     Current Facility-Administered Medications   Medication     0.9% sodium chloride BOLUS     0.9% sodium chloride BOLUS     acetaminophen (TYLENOL) tablet 650 mg    Or     acetaminophen (TYLENOL) Suppository 650 mg     albumin human 25 % injection 25 g     HYDROmorphone (DILAUDID) injection 0.2 mg     lidocaine (LMX4) kit     lidocaine 1 % 1 mL     naloxone (NARCAN) injection 0.1-0.4 mg     norepinephrine (LEVOPHED) 16 mg in D5W 250 mL infusion     pantoprazole (PROTONIX) 80 mg in sodium chloride 0.9 % 100 mL infusion     piperacillin-tazobactam (ZOSYN) 4.5 g vial to attach to  mL bag     sodium chloride (PF) 0.9% PF flush 3 mL     sodium chloride (PF) 0.9% PF flush 3 mL     sodium chloride 0.9% infusion     vancomycin (VANCOCIN) 2,000 mg in sodium chloride 0.9 % 500 mL intermittent infusion      No Known Allergies[KM1.1]    Review of Systems   Constitutional: Positive for[LM1.3] activity change[KM1.2],[LM1.3] appetite change (decrease)[KM1.2] and[LM1.3] fatigue[KM1.2]. Negative for[LM1.3] fever[KM1.2].[LM1.3]        Patient recently placed from [place] to [facility] approximately 5 days ago.[KM1.2]   HENT: Negative for[LM1.3] congestion[KM1.2],[LM1.3] nosebleeds[KM1.2],[LM1.3] sore throat[KM1.2] and[LM1.3] trouble swallowing[KM1.2].    Eyes: Negative  "for[LM1.3] redness[KM1.2] and[LM1.3] visual disturbance[KM1.2].   Respiratory: Negative for[LM1.3] cough[KM1.2],[LM1.3] chest tightness[KM1.2] and[LM1.3] shortness of breath[KM1.2].    Cardiovascular: Positive for[LM1.3] leg swelling (chronic)[KM1.2]. Negative for[LM1.3] chest pain[KM1.2].   Gastrointestinal: Positive for[LM1.3] blood in stool (noted in ostomy bag  new to patient)[KM1.2]. Negative for[LM1.3] abdominal pain[KM1.2],[LM1.3] nausea[KM1.2] and[LM1.3] vomiting[KM1.2].   Genitourinary: Positive for[LM1.3] difficulty urinating[KM1.2]. Negative for[LM1.3] flank pain[KM1.2].        Positive for bleeding from left nephrostomy tube.   Musculoskeletal: Positive for[LM1.3] back pain (chronic hx of osteo/discitis)[KM1.2] and[LM1.3] gait problem[KM1.2]. Negative for[LM1.3] arthralgias[KM1.2],[LM1.3] joint swelling[KM1.2],[LM1.3] neck pain[KM1.2] and[LM1.3] neck stiffness[KM1.2].   Skin: Negative for[LM1.3] color change[KM1.2] and[LM1.3] rash[KM1.2].   Allergic/Immunologic: Negative for[LM1.3] immunocompromised state[KM1.2].   Neurological: Positive for[LM1.3] weakness[KM1.2]. Negative for[LM1.3] seizures[KM1.2],[LM1.3] syncope[KM1.2],[LM1.3] light-headedness[KM1.2] and[LM1.3] headaches[KM1.2].   Hematological:[LM1.3] Does not bruise/bleed easily (not currently on lovenox)[KM1.2].   Psychiatric/Behavioral: Positive for[LM1.3] decreased concentration[KM1.2] and[LM1.3] dysphoric mood[KM1.2]. Negative for[LM1.3] confusion[KM1.2] and[LM1.3] hallucinations[KM1.2].   All other systems reviewed and are negative.[LM1.3]      Physical Exam[LM1.1]   BP: (!) 89/54  Pulse: 120  Heart Rate: 118  Temp: 98.1  F (36.7  C)  Resp: 22  Height: 188.5 cm (6' 2.2\")  Weight: 107.7 kg (237 lb 8 oz)  SpO2: 99 %[KM1.1]      Physical Exam   Constitutional: He appears[LM1.3] well-developed[KM1.2] and[LM1.3] well-nourished[KM1.2]. He appears[LM1.3] distressed[KM1.2].[LM1.3]   Patient is in minimal distress this point is not writhing in pain at " all.  Is alert and oriented to person and place.[KM1.2]   HENT:   Head:[LM1.3] Normocephalic[KM1.2] and[LM1.3] atraumatic[KM1.2].[LM1.3]   Mucous membranes are dry.  No sign of any bleeding around the mouth nose etc.[KM1.2]   Eyes:[LM1.3] EOM[KM1.2] are normal.[LM1.3] Pupils are equal, round, and reactive to light[KM1.2].[LM1.3] No scleral icterus[KM1.2].   Neck:[LM1.3] Normal range of motion[KM1.2].[LM1.3] Neck supple[KM1.2].[LM1.3] No JVD[KM1.2] present.   Cardiovascular:[LM1.3] Regular rhythm[KM1.2].[LM1.3]    Sinus tachycardia noted.[KM1.2]   Pulmonary/Chest: No[LM1.3] stridor[KM1.2]. No[LM1.3] respiratory distress[KM1.2]. He has[LM1.3] no wheezes[KM1.2]. He has[LM1.3] no rales[KM1.2]. He exhibits[LM1.3] no tenderness[KM1.2].   Abdominal: He exhibits[LM1.3] mass[KM1.2]. There is[LM1.3] no tenderness[KM1.2]. There is[LM1.3] no rebound[KM1.2].[LM1.3]   Patient's upper abdomen is soft.  Patient's ostomy is noted to have gross blood in the bag which is new which is guaiac positive.  Patient has large lower mid pelvic mass consistent with bladder cancer history.  No tenderness with this at all is nonpulsatile.[KM1.2]   Musculoskeletal: He exhibits[LM1.3] edema[KM1.2]. He exhibits no[LM1.3] tenderness[KM1.2].[LM1.3]   Patient with bilateral percutaneous nephrostomy drains the right one has saul colored urine the left has gross hematuria noted in this.  There is no pain or swelling at the site of insertion in the CVA region bilateral.[KM1.2]   Neurological: He is[LM1.3] alert[KM1.2]. He has[LM1.3] normal reflexes[KM1.2].[LM1.3]   Patient is oriented to person place at this point.  No obvious neurological focal findings noted[KM1.2]   Skin: Skin is[LM1.3] warm[KM1.2] and[LM1.3] dry[KM1.2].[LM1.3] No rash[KM1.2] noted. He is[LM1.3] not diaphoretic[KM1.2]. No[LM1.3] erythema[KM1.2]. No[LM1.3] pallor[KM1.2].   Psychiatric:[LM1.3]   Mildly flattened but appropriate here in the ER.   Nursing note[KM1.2] and[LM1.3]  vitals[KM1.2] reviewed.[LM1.3]      ED Course[LM1.1]     ED Course[KM1.1]     Procedures[LM1.1]         Patient evaluated in the ER.  As noted initially no history was available I talked to his sister Yesy to verify that he is full code.  Records were able to review from [facility] via [employer].  Recent hospitalization April of this year.  Patient with complex history metastatic bladder cancer with bowel obstructive pictures and renal obstruction with renal failure etc.  Patient's vital signs were monitored  And remained somewhat stable in the ER with pressures in the 90s heart rate is come down to the 1 teens.  Clinically patient was stable without decompensation in the ER.  2 large-bore IVs were placed IV fluid normal saline boluses given for fluid resuscitation ER.  As noted patient's ostomy bag has gross blood in this which is guaiac positive concerning for some GI bleed etiology.  Patient's laboratory testing revealed hemoglobin of 5.5 white count 13.5.  Urinalysis was sent showing many white cells red cells.  Patient's creatinine 1.72.  Potassium 4.5 glucose 146.  BUN is 47 alk phos 159 AST 54 total bilirubin 0.4 troponin negative.  Patient type and cross for 4 unit packed red cells.  Patient was consented also and agreed for transfusion.  Discussed case with ICU attending along with GI also who is aware the patient.  Patient will be transfused.  Patient will be admitted to ICU for ongoing management of concern for hypotension tachycardia with hemoglobin 5.5 with signs of acute GI bleeding.  Patient started on Protonix bolus and continuous infusion IV also.  Chest x-ray done reveals no acute infiltrate heart size normal no mediastinal widening.  No large effusions or infiltrates.    Patient prior to admit to ICU had a noncontrast CT scan of the abdomen pelvis.  Findings did not report any acute bleeding into the abdomen with this.  Please see report for further findings that appear to be chronic.    As noted  patient does not have a fever here appears otherwise stable relatively mildly hypotensive but looking back patient's blood pressure ranges approximately 100 or so also.  I do not see any sign of infection at this point more likely symptoms are reflective of decreased p.o. intake along with acute on chronic anemia with concerns for GI blood loss.             EKG Interpretation:      Interpreted by Christopher Vincent  Time reviewed: 2330  Symptoms at time of EKG: hypotension   Rhythm:sinus tach   Rate: 118  Axis: normal  Ectopy: none  Conduction: normal  ST Segments/ T Waves: No hyperacute ST-T wave changes  Q Waves: none  Comparison to prior: No old EKG available    Clinical Impression:sinus tachycardia[KM1.2]          Critical Care time:[LM1.1]  was 60 minutes for this patient excluding procedures.[KM1.2]            Labs Ordered and Resulted from Time of ED Arrival Up to the Time of Departure from the ED   CBC WITH PLATELETS DIFFERENTIAL - Abnormal; Notable for the following:        Result Value    WBC 13.5 (*)     RBC Count 2.28 (*)     Hemoglobin 5.5 (*)     Hematocrit 18.8 (*)     MCH 24.1 (*)     MCHC 29.3 (*)     RDW 17.1 (*)     Absolute Neutrophil 12.4 (*)     Absolute Lymphocytes 0.6 (*)     All other components within normal limits   INR - Abnormal; Notable for the following:     INR 1.27 (*)     All other components within normal limits   COMPREHENSIVE METABOLIC PANEL - Abnormal; Notable for the following:     Glucose 146 (*)     Urea Nitrogen 47 (*)     Creatinine 1.72 (*)     GFR Estimate 41 (*)     GFR Estimate If Black 49 (*)     Albumin 1.5 (*)     Protein Total 5.9 (*)     Alkaline Phosphatase 159 (*)     AST 54 (*)     All other components within normal limits   MAGNESIUM - Abnormal; Notable for the following:     Magnesium 2.5 (*)     All other components within normal limits   ROUTINE UA WITH MICROSCOPIC REFLEX TO CULTURE - Abnormal; Notable for the following:     Blood Urine Moderate (*)      Protein Albumin Urine 100 (*)     Leukocyte Esterase Urine Large (*)     WBC Urine >182 (*)     RBC Urine 112 (*)     WBC Clumps Present (*)     Bacteria Urine Many (*)     Yeast Urine Few (*)     Mucous Urine Present (*)     Hyaline Casts 33 (*)     All other components within normal limits   UA MACROSCOPIC WITH REFLEX TO MICRO AND CULTURE - Abnormal; Notable for the following:     Blood Urine Large (*)     Protein Albumin Urine 300 (*)     Nitrite Urine Positive (*)     Leukocyte Esterase Urine Moderate (*)     RBC Urine 1923 (*)     WBC Urine 2853 (*)     WBC Clumps Present (*)     Bacteria Urine Many (*)     Yeast Urine Moderate (*)     Transitional Epi 2 (*)     Mucous Urine Present (*)     Hyaline Casts 31 (*)     Granular Casts 31 (*)     All other components within normal limits   PARTIAL THROMBOPLASTIN TIME   TROPONIN I   PULSE OXIMETRY NURSING   CARDIAC CONTINUOUS MONITORING   PERIPHERAL IV CATHETER   IV ACCESS   ABO/RH TYPE AND SCREEN   RED BLOOD CELL PREPARE ORDER UNIT   BLOOD COMPONENT   URINE CULTURE AEROBIC BACTERIAL[KM1.1]     Results for orders placed or performed during the hospital encounter of 05/27/18   XR Chest Port 1 View    Narrative    RESIDENT PRELIMINARY REPORT - The following report is a preliminary  interpretation.   Low lung volumes with bibasilar and perihilar atelectasis.   CT Abdomen Pelvis w/o Contrast    Narrative    RESIDENT PRELIMINARY REPORT - The following report is a preliminary  interpretation.      1. Examination for malignancy of bleeding is limited by the lack of  intravenous contrast.  2a. Markedly abnormal appearance of the urinary bladder containing  soft tissue density as well as foci of air which is likely due to a  combination of bladder malignancy and blood clot. The urinary bladder  is significantly distended up to 26.2 cm.  2b. Confluent with the bladder mass posteriorly is a soft tissue mass  measuring 9.7 x 4.5 x 13.4 cm, which appears contiguous with  the  sigmoid colon and suspicious for invasion of the sigmoid colon. There  is a diverting colostomy in the left upper quadrant at the level of  the distal transverse colon.  2c. Erosion of the L5 vertebral body which is suspicious for  metastasis or local invasion of the L5 vertebral body from the urinary  bladder mass.  3. Hyperdense stool in the right colon and remaining transverse colon,  which is nonspecific and may be due to inspissated stool, excretion of  prior contrast, blood products, or a combination of both.  4. Bilateral percutaneous nephrostomy tubes with persistent moderate  hydronephrosis.  5. Cholelithiasis without evidence of cholecystitis.   XR Chest Port 1 View    Narrative    Chest radiograph one view  5/28/2018 7:58 AM      HISTORY: New central line placement    COMPARISON: 5/28/2018    FINDINGS: Left PICC tip projects over the lower SVC. Cardiac  silhouette is unchanged. Unchanged perihilar opacities. No pleural  effusion or pneumothorax.      Impression    IMPRESSION:   1. Left PICC tip projects over the lower SVC. No pneumothorax.  2. Unchanged perihilar opacities.   CBC with platelets differential   Result Value Ref Range    WBC 13.5 (H) 4.0 - 11.0 10e9/L    RBC Count 2.28 (L) 4.4 - 5.9 10e12/L    Hemoglobin 5.5 (LL) 13.3 - 17.7 g/dL    Hematocrit 18.8 (L) 40.0 - 53.0 %    MCV 83 78 - 100 fl    MCH 24.1 (L) 26.5 - 33.0 pg    MCHC 29.3 (L) 31.5 - 36.5 g/dL    RDW 17.1 (H) 10.0 - 15.0 %    Platelet Count 421 150 - 450 10e9/L    Diff Method Automated Method     % Neutrophils 91.6 %    % Lymphocytes 4.5 %    % Monocytes 3.0 %    % Eosinophils 0.4 %    % Basophils 0.1 %    % Immature Granulocytes 0.4 %    Nucleated RBCs 0 0 /100    Absolute Neutrophil 12.4 (H) 1.6 - 8.3 10e9/L    Absolute Lymphocytes 0.6 (L) 0.8 - 5.3 10e9/L    Absolute Monocytes 0.4 0.0 - 1.3 10e9/L    Absolute Eosinophils 0.1 0.0 - 0.7 10e9/L    Absolute Basophils 0.0 0.0 - 0.2 10e9/L    Abs Immature Granulocytes 0.1 0 - 0.4  10e9/L    Absolute Nucleated RBC 0.0    Partial thromboplastin time   Result Value Ref Range    PTT 29 22 - 37 sec   INR   Result Value Ref Range    INR 1.27 (H) 0.86 - 1.14   Comprehensive metabolic panel   Result Value Ref Range    Sodium 134 133 - 144 mmol/L    Potassium 4.5 3.4 - 5.3 mmol/L    Chloride 102 94 - 109 mmol/L    Carbon Dioxide 22 20 - 32 mmol/L    Anion Gap 10 3 - 14 mmol/L    Glucose 146 (H) 70 - 99 mg/dL    Urea Nitrogen 47 (H) 7 - 30 mg/dL    Creatinine 1.72 (H) 0.66 - 1.25 mg/dL    GFR Estimate 41 (L) >60 mL/min/1.7m2    GFR Estimate If Black 49 (L) >60 mL/min/1.7m2    Calcium 8.5 8.5 - 10.1 mg/dL    Bilirubin Total 0.4 0.2 - 1.3 mg/dL    Albumin 1.5 (L) 3.4 - 5.0 g/dL    Protein Total 5.9 (L) 6.8 - 8.8 g/dL    Alkaline Phosphatase 159 (H) 40 - 150 U/L    ALT 27 0 - 70 U/L    AST 54 (H) 0 - 45 U/L   Magnesium   Result Value Ref Range    Magnesium 2.5 (H) 1.6 - 2.3 mg/dL   Troponin I   Result Value Ref Range    Troponin I ES <0.015 0.000 - 0.045 ug/L   UA with Microscopic reflex to Culture   Result Value Ref Range    Color Urine Yellow     Appearance Urine Cloudy     Glucose Urine Negative NEG^Negative mg/dL    Bilirubin Urine Negative NEG^Negative    Ketones Urine Negative NEG^Negative mg/dL    Specific Gravity Urine 1.016 1.003 - 1.035    Blood Urine Moderate (A) NEG^Negative    pH Urine 5.5 5.0 - 7.0 pH    Protein Albumin Urine 100 (A) NEG^Negative mg/dL    Urobilinogen mg/dL Normal 0.0 - 2.0 mg/dL    Nitrite Urine Negative NEG^Negative    Leukocyte Esterase Urine Large (A) NEG^Negative    Source Catheterized Urine     WBC Urine >182 (H) 0 - 5 /HPF    RBC Urine 112 (H) 0 - 2 /HPF    WBC Clumps Present (A) NEG^Negative /HPF    Bacteria Urine Many (A) NEG^Negative /HPF    Yeast Urine Few (A) NEG^Negative /HPF    Mucous Urine Present (A) NEG^Negative /LPF    Hyaline Casts 33 (H) 0 - 2 /LPF   UA reflex to Microscopic and Culture   Result Value Ref Range    Color Urine Dark Red     Appearance Urine  Cloudy     Glucose Urine Negative NEG^Negative mg/dL    Bilirubin Urine Negative NEG^Negative    Ketones Urine Negative NEG^Negative mg/dL    Specific Gravity Urine 1.020 1.003 - 1.035    Blood Urine Large (A) NEG^Negative    pH Urine 6.0 5.0 - 7.0 pH    Protein Albumin Urine 300 (A) NEG^Negative mg/dL    Urobilinogen mg/dL Normal 0.0 - 2.0 mg/dL    Nitrite Urine Positive (A) NEG^Negative    Leukocyte Esterase Urine Moderate (A) NEG^Negative    Source Catheterized Urine     RBC Urine 1923 (H) 0 - 2 /HPF    WBC Urine 2853 (H) 0 - 5 /HPF    WBC Clumps Present (A) NEG^Negative /HPF    Bacteria Urine Many (A) NEG^Negative /HPF    Yeast Urine Moderate (A) NEG^Negative /HPF    Transitional Epi 2 (H) 0 - 1 /HPF    Mucous Urine Present (A) NEG^Negative /LPF    Hyaline Casts 31 (H) 0 - 2 /LPF    Granular Casts 31 (A) NEG^Negative /LPF   Comprehensive metabolic panel   Result Value Ref Range    Sodium 138 133 - 144 mmol/L    Potassium 4.5 3.4 - 5.3 mmol/L    Chloride 104 94 - 109 mmol/L    Carbon Dioxide 24 20 - 32 mmol/L    Anion Gap 10 3 - 14 mmol/L    Glucose 113 (H) 70 - 99 mg/dL    Urea Nitrogen 46 (H) 7 - 30 mg/dL    Creatinine 1.55 (H) 0.66 - 1.25 mg/dL    GFR Estimate 46 (L) >60 mL/min/1.7m2    GFR Estimate If Black 56 (L) >60 mL/min/1.7m2    Calcium 8.5 8.5 - 10.1 mg/dL    Bilirubin Total 0.4 0.2 - 1.3 mg/dL    Albumin 1.6 (L) 3.4 - 5.0 g/dL    Protein Total 6.2 (L) 6.8 - 8.8 g/dL    Alkaline Phosphatase 166 (H) 40 - 150 U/L    ALT 29 0 - 70 U/L    AST 52 (H) 0 - 45 U/L   INR   Result Value Ref Range    INR 1.20 (H) 0.86 - 1.14                 Procalcitonin   Result Value Ref Range    Procalcitonin 11.54 (HH) ng/ml   Blood gas arterial   Result Value Ref Range    pH Arterial 7.40 7.35 - 7.45 pH    pCO2 Arterial 35 35 - 45 mm Hg    pO2 Arterial 76 (L) 80 - 105 mm Hg    Bicarbonate Arterial 21 21 - 28 mmol/L    Base Deficit Art 3.2 mmol/L    FIO2 2L    CBC (AM Draw)   Result Value Ref Range    WBC 12.0 (H) 4.0 - 11.0  10e9/L    RBC Count 2.62 (L) 4.4 - 5.9 10e12/L    Hemoglobin 6.5 (LL) 13.3 - 17.7 g/dL    Hematocrit 21.7 (L) 40.0 - 53.0 %    MCV 83 78 - 100 fl    MCH 24.8 (L) 26.5 - 33.0 pg    MCHC 30.0 (L) 31.5 - 36.5 g/dL    RDW 16.9 (H) 10.0 - 15.0 %    Platelet Count 396 150 - 450 10e9/L   CBC with platelets   Result Value Ref Range    WBC 11.3 (H) 4.0 - 11.0 10e9/L    RBC Count 2.71 (L) 4.4 - 5.9 10e12/L    Hemoglobin 6.9 (LL) 13.3 - 17.7 g/dL    Hematocrit 22.7 (L) 40.0 - 53.0 %    MCV 84 78 - 100 fl    MCH 25.5 (L) 26.5 - 33.0 pg    MCHC 30.4 (L) 31.5 - 36.5 g/dL    RDW 16.7 (H) 10.0 - 15.0 %    Platelet Count 377 150 - 450 10e9/L   EKG 12 lead   Result Value Ref Range    Interpretation ECG Click View Image link to view waveform and result    ABO/Rh type and screen   Result Value Ref Range    Units Ordered 4     ABO A     RH(D) Pos     Antibody Screen Neg     Test Valid Only At          [facility]    Specimen Expires 05/30/2018     Crossmatch Red Blood Cells    Blood component   Result Value Ref Range    Unit Number [id number]     Blood Component Type Red Blood Cells Leukocyte Reduced     Division Number 00     Status of Unit Released to care unit 05/28/2018 0506     Blood Product Code I3559M69     Unit Status ISS    Blood component   Result Value Ref Range    Unit Number [id number]     Blood Component Type Red Blood Cells Leukocyte Reduced     Division Number 00     Status of Unit Released to care unit 05/28/2018 0358     Blood Product Code V4399S88     Unit Status ISS    Blood component   Result Value Ref Range    Unit Number [id number]     Blood Component Type Red Blood Cells Leukocyte Reduced     Division Number 00     Status of Unit Ready for patient 05/28/2018 0018     Blood Product Code P2640O29     Unit Status BENOIT    Blood component   Result Value Ref Range    Unit Number [id number]     Blood Component Type Red Blood Cells Leukocyte Reduced     Division  Number 00     Status of Unit Ready for patient 05/28/2018 0739     Blood Product Code M7762L33     Unit Status BENOIT      *Note: Due to a large number of results and/or encounters for the requested time period, some results have not been displayed. A complete set of results can be found in Results Review.[KM1.3]              Assessments & Plan (with Medical Decision Making)[LM1.1]  57-year-old male history of metastatic latter cancer who is still full code presents from [facility] with low blood pressure with blood and left percutaneous nephrostomy drain.  History initially was limited as we did not have records available I did talk to patient's sister after he okayed this and she did give some history and agreed that he was still full code.  Patient evaluation ER had 2 large-bore IVs placed IV fluid resuscitation.  Hemoglobin noted be 5.5.  Patient's abdomen although the upper abdomen is soft is not complaining abdominal pain he has a ostomy was placed in February this year.  There is gross blood noted in that concerning of some GI bleeding etiology.  Patient's hemoglobin is 5.5 he was consented and type and cross for 4 units and 2 units were ordered to be transfused.  Patient started on Protonix no history of varices noted.  EKG did not show ischemic changes some mild tachycardia noted chest x-ray without any acute changes noted.  Patient had a noncontrast CT as he has chronic kidney disease findings did not show any acute blood in the abdomen or acute obstructive pictures free air etc.  As noted patient was treated with Protonix IV also discussed with GI discussed with ICU patient then admitted to ICU for ongoing management of hypotensive patient with signs of GI bleeding with low hemoglobin requiring transfusion.  No sign of obvious sepsis seen at this point.  Patient also has decreased p.o. intake as is been at the care unit for approximate 5 days.  As noted patient given fluid resuscitation ER  also prior.[KM1.2]       I have reviewed the nursing notes.    I have reviewed the findings, diagnosis, plan and need for follow up with the patient.[LM1.1]    Current Discharge Medication List          Final diagnoses:   GI bleed   Hypotension due to blood loss   EVERTON (acute kidney injury) (H)   Malignant neoplasm of urinary bladder, unspecified site (H)   Hematuria, gross   Nephrostomy tube bleed (H)   Dehydration[KM1.1]     I, Mary Block, am serving as a trained medical scribe to document services personally performed by Christopher Vincent MD, based on the provider's statements to me.   IChristopher MD, was physically present and have reviewed and verified the accuracy of this note documented by Mary Block.[LM1.2]    5/27/2018   [facility], [place], EMERGENCY DEPARTMENT[LM1.1]    This note was created at least in part by the use of dragon voice dictation system. Inadvertent typographical errors may still exist.  Christopher Vincent MD.[KM1.2]         Christopher Vincent MD  05/28/18 0845  [KM1.4]     Revision History        User Key Date/Time User Provider Type Action    > KM1.4 5/28/2018  8:45 AM Christopher Vincent MD Physician Sign     KM1.1 5/28/2018  8:44 AM Christopher Vincent MD Physician      KM1.3 5/28/2018  8:26 AM Christopher Vincent MD Physician      KM1.2 5/28/2018  7:56 AM Christopher Vincent MD Physician      LM1.3 5/28/2018 12:14 AM Mary Block Scribtrevor Balderas     LM1.2 5/27/2018 11:53 PM Mary Block Scribe Sherrie     LM1.1 5/27/2018 11:30 PM Mary Block            Progress Notes by Chen Carrera MD at 5/28/2018 12:56 AM     Author:  Chen Carrera MD Service:  Gastroenterology Author Type:  Fellow    Filed:  5/28/2018  2:12 AM Date of Service:  5/28/2018 12:56 AM Creation Time:  5/28/2018 12:56 AM    Status:  Addendum :  Chen Carrera MD (Fellow)         Brief GI Note  05/28/18    Called by ER MD Dr. Vincent regarding Mr. Uribe who is a 57 year old male with h/o  extensive bladder cancer s/p diverting colostomy and bilateral percutaneous nephrostomy tubes with recent C difficile infection presenting from SNF with bloody nephrostomy tube output, leukocytosis, hemoglobin 5.5, pyuria on UA and borderline hypotension (MAPs currently in mid 60s), HRs 110s. On evaluation noted to have some bloody ostomy output. Patient is being admitted to MICU for further evaluation. Patient has been started on PPI gtt.     Unclear what is driving clinical picture at this point (bleeding vs infection). After discussion with ICU resident minimal/small (~10 cc) amount of bloody output in ostomy bag, patient notes symptoms of nephrostomy and ostomy bleeding x ~5 days, otherwise asymptomatic Hemoglobin during April admission in 7s range (~1.5 gm higher than current hemoglobin).     Recommend infectious evaluation given recent admission at OSH for osteomyelitis, UTI, rectus sheath abscess.  Note pending CT scan of the abdomen/pelvis.  Agree with blood transfusion and supportive care per ICU team. Ensure two large bore peripheral IVs. Trend hemoglobin. Strict documentation of nephrostomy and ostomy output.     Differential diagnosis for Mr. Uribe's bloody ostomy output can include direct tumor invasion of surrounding structures +/- vascular involvement (would be concerned for this given nephrostomy bleeding), stomal irritation, C difficile infection; less likely stomal varices (no h/o portal hypertension), brisk upper GI bleed, AVM, etc.      Formal GI consult in AM. If patient decompensates overnight, does not correct hemoglobin with transfusion, has increasing bloody output from ostomy, please contact me.[AA1.1]     Addendum @ 2:11a: Discussed CT imaging with reading radiologist, overall unable to evaluate for  bleeding given non-contrast study, however, most likely hyperenhancement noted on images is stool burden, prior contrast and medications rather than active bleeding. More concerning is  bladder mass, extensive mass effect. Recommend obtaining outside images with outside reads requested by our radiologists.[AA1.2]    Chen Carrera MD  Gastroenterology Fellow[AA1.1]     Revision History        User Key Date/Time User Provider Type Action    > AA1.2 5/28/2018  2:12 AM Chen Carrera MD Fellow Addend     AA1.1 5/28/2018  1:16 AM Chen Carrera MD Fellow Sign            ED Notes by Catherine Sumner RN at 5/28/2018 12:55 AM     Author:  Taisha, Catherine, RN Service:  Nursing Author Type:  Registered Nurse    Filed:  5/28/2018 12:55 AM Date of Service:  5/28/2018 12:55 AM Creation Time:  5/28/2018 12:55 AM    Status:  Signed :  Catherine Sumner RN (Registered Nurse)         Finger stick oxygen sat probe placed on pts finger. Pt placed on 1L oxygen to maintain saturations while sleeping (pt is snoring)[KK1.1]     Revision History        User Key Date/Time User Provider Type Action    > KK1.1 5/28/2018 12:55 AM Catherine Sumner RN Registered Nurse Sign            ED Notes by Catherine Sumner RN at 5/28/2018 12:50 AM     Author:  Taisha, Catherine, RN Service:  Nursing Author Type:  Registered Nurse    Filed:  5/28/2018 12:50 AM Date of Service:  5/28/2018 12:50 AM Creation Time:  5/28/2018 12:50 AM    Status:  Signed :  Catherine Sumner RN (Registered Nurse)         Blood started with GENET Angela. Vitals pre-transfusion: 97.8 temp, 95% room air, 112 heart rate, 94/53 bp[KK1.1]     Revision History        User Key Date/Time User Provider Type Action    > KK1.1 5/28/2018 12:50 AM Catherine Sumner RN Registered Nurse Sign            ED Notes by Catherine Sumner RN at 5/28/2018 12:50 AM     Author:  Taisha, Catherine, RN Service:  Nursing Author Type:  Registered Nurse    Filed:  5/28/2018 12:50 AM Date of Service:  5/28/2018 12:50 AM Creation Time:  5/28/2018 12:50 AM    Status:  Signed :  Catherine Sumner RN (Registered Nurse)         Critical Care Time (RN) Mins:20[KK1.1]      Revision History        User Key Date/Time User Provider Type Action    > KK1.1 5/28/2018 12:50 AM Catherine Sumner RN Registered Nurse Sign            ED Notes by Catherine Sumner RN at 5/28/2018 12:00 AM     Author:  Taisha, Catherine, RN Service:  Nursing Author Type:  Registered Nurse    Filed:  5/28/2018 12:03 AM Date of Service:  5/28/2018 12:00 AM Creation Time:  5/28/2018 12:03 AM    Status:  Signed :  Catherine Sumner RN (Registered Nurse)         [facility]   ED Nurse to Floor Handoff     Edy Uribe is a 57 year old male who speaks English and lives alone,  in a nursing home  They arrived in the ED by ambulance from home    ED Chief Complaint: Hypotension and Hematuria    ED Dx;   Final diagnoses:   GI bleed         Needed?: No    Allergies: No Known Allergies.  Past Medical Hx: No past medical history on file.   Baseline Mental status: WDL  Current Mental Status changes: at basesline    Infection present or suspected this encounter: no  Sepsis suspected: No  Isolation type: No active isolations     Activity level - Baseline/Home:  Stand with Assist  Activity Level - Current:   Stand with Assist    Bariatric equipment needed?: No    In the ED these meds were given:   Medications   lidocaine 1 % 1 mL (not administered)   lidocaine (LMX4) kit (not administered)   sodium chloride (PF) 0.9% PF flush 3 mL (not administered)   sodium chloride (PF) 0.9% PF flush 3 mL (not administered)   0.9% sodium chloride BOLUS (1,000 mLs Intravenous New Bag 5/27/18 2343)     Followed by   0.9% sodium chloride BOLUS (1,000 mLs Intravenous New Bag 5/27/18 2321)     Followed by   sodium chloride 0.9% infusion (not administered)   pantoprazole (PROTONIX) 40 mg IV push injection (not administered)   pantoprazole (PROTONIX) 80 mg in sodium chloride 0.9 % 100 mL infusion (not administered)       Drips running?  Yes    Home pump  No    Current LDAs  Peripheral IV 05/27/18  Right Lower forearm (Active)   Site Assessment [facility] 5/27/2018 11:24 PM   Line Status Infusing 5/27/2018 11:24 PM   Infiltration Scale 0 5/27/2018 11:24 PM   Infiltration Site Treatment Method  None 5/27/2018 11:24 PM   Extravasation? No 5/27/2018 11:24 PM   Number of days:1       Peripheral IV 05/27/18 Right Upper forearm (Active)   Site Assessment [facility] 5/27/2018 11:31 PM   Line Status Saline locked 5/27/2018 11:31 PM   Phlebitis Scale 0-->no symptoms 5/27/2018 11:31 PM   Infiltration Scale 0 5/27/2018 11:31 PM   Infiltration Site Treatment Method  None 5/27/2018 11:31 PM   Extravasation? No 5/27/2018 11:31 PM   Number of days:1       Labs results:   Labs Ordered and Resulted from Time of ED Arrival Up to the Time of Departure from the ED   CBC WITH PLATELETS DIFFERENTIAL - Abnormal; Notable for the following:        Result Value    WBC 13.5 (*)     RBC Count 2.28 (*)     Hemoglobin 5.5 (*)     Hematocrit 18.8 (*)     MCH 24.1 (*)     MCHC 29.3 (*)     RDW 17.1 (*)     All other components within normal limits   INR - Abnormal; Notable for the following:     INR 1.27 (*)     All other components within normal limits   COMPREHENSIVE METABOLIC PANEL - Abnormal; Notable for the following:     Glucose 146 (*)     Urea Nitrogen 47 (*)     Creatinine 1.72 (*)     GFR Estimate 41 (*)     GFR Estimate If Black 49 (*)     Albumin 1.5 (*)     Protein Total 5.9 (*)     Alkaline Phosphatase 159 (*)     AST 54 (*)     All other components within normal limits   MAGNESIUM - Abnormal; Notable for the following:     Magnesium 2.5 (*)     All other components within normal limits   PARTIAL THROMBOPLASTIN TIME   TROPONIN I   ROUTINE UA WITH MICROSCOPIC REFLEX TO CULTURE   PULSE OXIMETRY NURSING   CARDIAC CONTINUOUS MONITORING   PERIPHERAL IV CATHETER   IV ACCESS   ABO/RH TYPE AND SCREEN   RED BLOOD CELL PREPARE ORDER UNIT       Imaging Studies: No results found for this or any previous visit (from the past 24 hour(s)).    Recent vital  signs:   BP (!) 89/54  Pulse 120  Temp 98.1  F (36.7  C) (Oral)  Wt 107.7 kg (237 lb 8 oz)  SpO2 99%    Cardiac Rhythm: Tachycardia  Pt needs tele? Yes  Skin/wound Issues: pt has an ostomy in his stomach and nephrostomy tubes    Code Status: Full Code    Pain control: good    Nausea control: pt had none    Abnormal labs/tests/findings requiring intervention: Hg 5.5, magnesium 2.5, urine sample taken from right neph tube. Pt was hypotensive on arrival 89 systolic. Now has gone up into the 90's. According to EMS, pt has a hx of a personality disorder. Staff at [facility] noticed he was bleeding from his ostomy earlier this afternoon. He wanted to be taken to [facility] Health     Family present during ED course? No   Family Comments/Social Situation comments: sister Yesy is an RN and his point person in the family who knows all his info.     Tasks needing completion: None    Catherine Sumner RN  [facility]-- [id number]  [phone] [place] [facility]  [phone] [facility] ED[KK1.1]         Revision History        User Key Date/Time User Provider Type Action    > KK1.1 5/28/2018 12:03 AM Catherine Sumner RN Registered Nurse Sign                     Procedure Notes      Procedures by Edison Baltazar MD at 5/28/2018  7:21 AM     Author:  Edison Baltazar MD Service:  Surgical ICU Author Type:  Anesthesiologist    Filed:  5/28/2018  7:21 AM Date of Service:  5/28/2018  7:21 AM Creation Time:  5/28/2018  7:15 AM    Status:  Signed :  Edison Baltazar MD (Anesthesiologist)     Pre-procedure Diagnoses:    1. Anemia due to blood loss, acute [D62]           Post-procedure Diagnoses:    1. Anemia due to blood loss, acute [D62]           Procedures:    1. CENTRAL LINE [PRO85 (Custom)]               After obtaining verbal consent from sister the patient was prepped and draped in usual sterile fashion.  The are over the left subclavian vein was injected with 5cc of 1% lidocaine.  After waiting one minute, the left subclavian vein was entered with a hollow  bore needle with brisk blood return which was venous in nature.  A TLC was inserted to 18 cm using the seldinger technique.  The wire was removed intact.  Blood was withdrawn from all three ports and flushed.  The line was sutured in place and dressed.  CXR was ordered and will be checked by the MICU team.  Patient tolerated the procedure well.    Edison Baltazar MD[RG1.1]     Revision History        User Key Date/Time User Provider Type Action    > RG1.1 5/28/2018  7:21 AM Edison Baltazar MD Anesthesiologist Sign                     Progress Notes - Therapies (Notes from 05/28/18 through 05/31/18)      Progress Notes by Cammie Medina OT at 5/29/2018 11:15 AM     Author:  Cammie Medina OT Service:  (none) Author Type:  Occupational Therapist    Filed:  5/29/2018 11:15 AM Date of Service:  5/29/2018 11:15 AM Creation Time:  5/29/2018 11:15 AM    Status:  Signed :  Cammie Medina OT (Occupational Therapist)          05/29/18 1105   Quick Adds   Type of Visit Initial Occupational Therapy Evaluation   Living Environment   Lives With significant other;other (see comments)   Living Arrangements house   Home Accessibility stairs to enter home   Number of Stairs to Enter Home 25   Number of Stairs Within Home 0   Transportation Available family or friend will provide;car   Living Environment Comment Pt was living in a house with his girlfriend and her father at baseline. Has been in/out of hospitals/rehab facilities for the last few months.   Self-Care   Dominant Hand right   Usual Activity Tolerance good   Current Activity Tolerance poor   Equipment Currently Used at Home none   Activity/Exercise/Self-Care Comment Pt shrugs shoulder when asked abotu prior activity level. Pt has been using a FWW most recently while in facilities for functional mobility and could walk ~300 feet at a time.   Functional Level Prior   Ambulation 0-->independent   Transferring 0-->independent   Toileting 0-->independent   Bathing  0-->independent   Dressing 0-->independent   Eating 0-->independent   Communication 0-->understands/communicates without difficulty   Swallowing 0-->swallows foods/liquids without difficulty   Cognition 0 - no cognition issues reported   Which of the above functional risks had a recent onset or change? ambulation;transferring;toileting;bathing;dressing;cognition   Prior Functional Level Comment Pt was IND with ADL prior to recent hospitalizations.   General Information   Onset of Illness/Injury or Date of Surgery - Date 05/27/18   Referring Physician Ludy Bower MD   Patient/Family Goals Statement Increase strength   Additional Occupational Profile Info/Pertinent History of Current Problem Edy Uribe is a 57 year old male with a history of stage IV bladder cancer complicated by obstructive uropathy s/p bilateral nephrostomy tubes (currently receiving neoadjuvant chemo in hopes of surgical treatment), rectal obstruction s/p diverting ostomy who presented from nursing home with blood coming from his left nephrostomy tube and hypotension, admitted to MICU with shock (hemorrhagic vs septic) requiring pressor support this morning.    Precautions/Limitations fall precautions;oxygen therapy device and L/min   General Observations Pt supine in bed upon arrival, agreeable to therapy.   Cognitive Status Examination   Orientation orientation to person, place and time   Level of Consciousness alert   Able to Follow Commands mild impairment   Cognitive Comment No cog issues reported. Slow processing speed noted.   Visual Perception   Visual Perception No deficits were identified   Sensory Examination   Sensory Comments BLE neuropathy at baseline.    Pain Assessment   Patient Currently in Pain No   Range of Motion (ROM)   ROM Quick Adds No deficits were identified   Strength   Strength Comments Generalized weakness   Instrumental Activities of Daily Living (IADL)   IADL Comments Pt receiving assist with all IADL  "at facility.   Activities of Daily Living Analysis   Impairments Contributing to Impaired Activities of Daily Living balance impaired;cognition impaired;fear and anxiety;strength decreased;sensation decreased   General Therapy Interventions   Planned Therapy Interventions ADL retraining;IADL retraining;cognition;bed mobility training;strengthening;transfer training;progressive activity/exercise;home program guidelines   Clinical Impression   Criteria for Skilled Therapeutic Interventions Met yes, treatment indicated   OT Diagnosis OT order for deconditioning   Influenced by the following impairments pain, activity tolerance, strength   Assessment of Occupational Performance 3-5 Performance Deficits   Identified Performance Deficits bathing, dressing, social skills   Clinical Decision Making (Complexity) Low complexity   Therapy Frequency 5 times/wk   Predicted Duration of Therapy Intervention (days/wks) 2 weeks   Anticipated Equipment Needs at Discharge (TBD with further therapy)   Anticipated Discharge Disposition Transitional Care Facility;Long Term Care Facility   Risks and Benefits of Treatment have been explained. Yes   Patient, Family & other staff in agreement with plan of care Yes   [facility] TM \"6 Clicks\"   2016, Trustees of [facility], under license to [employer]Â®.  All rights reserved.   6 Clicks Short Forms Daily Activity Inpatient Short Form   [facility]  \"6 Clicks\" Daily Activity Inpatient Short Form   1. Putting on and taking off regular lower body clothing? 2 - A Lot   2. Bathing (including washing, rinsing, drying)? 1 - Total   3. Toileting, which includes using toilet, bedpan or urinal? 1 - Total   4. Putting on and taking off regular upper body clothing? 2 - A Lot   5. Taking care of personal grooming such as brushing teeth? 3 - A Little   6. Eating meals? 2 - A Lot   Daily Activity Raw Score (Score out of 24.Lower scores equate to lower levels of function) 11 "   Total Evaluation Time   Total Evaluation Time (Minutes) 8[SS1.1]        Revision History        User Key Date/Time User Provider Type Action    > SS1.1 5/29/2018 11:15 AM Cammie Medina OT Occupational Therapist Sign

## 2018-05-27 NOTE — IP AVS SNAPSHOT
` `       Patient Information     Patient Name Sex     Edy Uribe ([id number]) Male 1960       Room Bed    4307 4307-01      Patient Demographics     Address Phone    [address]  [place] [address] [phone] (Home)  [phone] (Mobile)      Patient Ethnicity & Race     Ethnic Group Patient Race    American White      Emergency Contact(s)     Name Relation Home Work Mobile    Yesy Ortega [phone]        Documents on File        Status Date Received Description       Documents for the Patient    Affiliate Privacy placeholder   phase3    Consent for EHR Access Received 18     Insurance Card       Patient ID       [facility] Specified Other       Privacy Notice - [place] Received 18     Consent for Services - Hospital and Clinic Received 18     [facility] Received 18        Documents for the Encounter    CMS IM for Patient Signature         Admission Information     Attending Provider Admitting Provider Admission Type Admission Date/Time    Kelley Lozano MD Gould, Robert, MD Emergency 187    Discharge Date Hospital Service Auth/Cert Status Service Area     Medical ICU Incomplete [facility] SERVICES    Unit Room/Bed Admission Status       [facility] MEDICAL ICU 4307/4307-01 Admission (Confirmed)       Admission     Complaint    GIB (gastrointestinal bleeding)      Hospital Account     Name Acct ID Class Status Primary Coverage    Edy Uribe [id number] Inpatient Open BLUE PLUS - BLUE PLUS [place]            Guarantor Account (for Hospital Account #[id number])     Name Relation to Pt Service Area Active? Acct Type    Edy Uribe Self FCS Yes Personal/Family    Address Phone          [address]  [place] [address] [phone](H)              Coverage Information (for Hospital Account #[id number])     F/O Payor/Plan Precert #    BLUE PLUS/BLUE PLUS [place]     Subscriber Subscriber #    Edy Uribe [id number]     Address Phone    [address] [address]  [place] [address] [phone]

## 2018-05-28 PROBLEM — K92.2 GIB (GASTROINTESTINAL BLEEDING): Status: ACTIVE | Noted: 2018-01-01

## 2018-05-28 NOTE — ED NOTES
[facility], [place]   ED Nurse to Floor Handoff     Edy Uribe is a 57 year old male who speaks English and lives alone,  in a nursing home  They arrived in the ED by ambulance from home    ED Chief Complaint: Hypotension and Hematuria    ED Dx;   Final diagnoses:   GI bleed         Needed?: No    Allergies: No Known Allergies.  Past Medical Hx: No past medical history on file.   Baseline Mental status: WDL  Current Mental Status changes: at basesline    Infection present or suspected this encounter: no  Sepsis suspected: No  Isolation type: No active isolations     Activity level - Baseline/Home:  Stand with Assist  Activity Level - Current:   Stand with Assist    Bariatric equipment needed?: No    In the ED these meds were given:   Medications   lidocaine 1 % 1 mL (not administered)   lidocaine (LMX4) kit (not administered)   sodium chloride (PF) 0.9% PF flush 3 mL (not administered)   sodium chloride (PF) 0.9% PF flush 3 mL (not administered)   0.9% sodium chloride BOLUS (1,000 mLs Intravenous New Bag 5/27/18 2343)     Followed by   0.9% sodium chloride BOLUS (1,000 mLs Intravenous New Bag 5/27/18 2321)     Followed by   sodium chloride 0.9% infusion (not administered)   pantoprazole (PROTONIX) 40 mg IV push injection (not administered)   pantoprazole (PROTONIX) 80 mg in sodium chloride 0.9 % 100 mL infusion (not administered)       Drips running?  Yes    Home pump  No    Current LDAs  Peripheral IV 05/27/18 Right Lower forearm (Active)   Site Assessment [facility] 5/27/2018 11:24 PM   Line Status Infusing 5/27/2018 11:24 PM   Infiltration Scale 0 5/27/2018 11:24 PM   Infiltration Site Treatment Method  None 5/27/2018 11:24 PM   Extravasation? No 5/27/2018 11:24 PM   Number of days:1       Peripheral IV 05/27/18 Right Upper forearm (Active)   Site Assessment [facility] 5/27/2018 11:31 PM   Line Status Saline locked 5/27/2018 11:31 PM   Phlebitis Scale 0-->no symptoms 5/27/2018  11:31 PM   Infiltration Scale 0 5/27/2018 11:31 PM   Infiltration Site Treatment Method  None 5/27/2018 11:31 PM   Extravasation? No 5/27/2018 11:31 PM   Number of days:1       Labs results:   Labs Ordered and Resulted from Time of ED Arrival Up to the Time of Departure from the ED   CBC WITH PLATELETS DIFFERENTIAL - Abnormal; Notable for the following:        Result Value    WBC 13.5 (*)     RBC Count 2.28 (*)     Hemoglobin 5.5 (*)     Hematocrit 18.8 (*)     MCH 24.1 (*)     MCHC 29.3 (*)     RDW 17.1 (*)     All other components within normal limits   INR - Abnormal; Notable for the following:     INR 1.27 (*)     All other components within normal limits   COMPREHENSIVE METABOLIC PANEL - Abnormal; Notable for the following:     Glucose 146 (*)     Urea Nitrogen 47 (*)     Creatinine 1.72 (*)     GFR Estimate 41 (*)     GFR Estimate If Black 49 (*)     Albumin 1.5 (*)     Protein Total 5.9 (*)     Alkaline Phosphatase 159 (*)     AST 54 (*)     All other components within normal limits   MAGNESIUM - Abnormal; Notable for the following:     Magnesium 2.5 (*)     All other components within normal limits   PARTIAL THROMBOPLASTIN TIME   TROPONIN I   ROUTINE UA WITH MICROSCOPIC REFLEX TO CULTURE   PULSE OXIMETRY NURSING   CARDIAC CONTINUOUS MONITORING   PERIPHERAL IV CATHETER   IV ACCESS   ABO/RH TYPE AND SCREEN   RED BLOOD CELL PREPARE ORDER UNIT       Imaging Studies: No results found for this or any previous visit (from the past 24 hour(s)).    Recent vital signs:   BP (!) 89/54  Pulse 120  Temp 98.1  F (36.7  C) (Oral)  Wt 107.7 kg (237 lb 8 oz)  SpO2 99%    Cardiac Rhythm: Tachycardia  Pt needs tele? Yes  Skin/wound Issues: pt has an ostomy in his stomach and nephrostomy tubes    Code Status: Full Code    Pain control: good    Nausea control: pt had none    Abnormal labs/tests/findings requiring intervention: Hg 5.5, magnesium 2.5, urine sample taken from right neph tube. Pt was hypotensive on arrival 89  systolic. Now has gone up into the 90's. According to EMS, pt has a hx of a personality disorder. Staff at [facility] noticed he was bleeding from his ostomy earlier this afternoon. He wanted to be taken to [facility]     Family present during ED course? No   Family Comments/Social Situation comments: sister Yesy is an RN and his point person in the family who knows all his info.     Tasks needing completion: None    Catherine Sumner RN  [facility]-- [phone] [place] ED  [phone] [facility] ED

## 2018-05-28 NOTE — ED NOTES
Finger stick oxygen sat probe placed on pts finger. Pt placed on 1L oxygen to maintain saturations while sleeping (pt is snoring)

## 2018-05-28 NOTE — H&P
[facility]      MICU History and Physical  Edy Uribe MRN: [id number]  1960  Date of Admission:5/27/2018  Primary care provider: No Ref-Primary, Physician      Assessment and Plan:     Edy Uribe is a 57 year old male with a history of stage IV bladder cancer complicated by obstructive uropathy s/p bilateral nephrostomy tubes, rectal obstruction s/p diverting ostomy who presented for blood coming from his left nephrostomy tube from nursing home.     PLAN:  ===NEURO===  # AMS  Patient protecting his airway, ABG without hypercarbia. Likely secondary to shock and will resuscitate per below. He does not have focal neuro deficits.   - Monitor     ===CARDIOVASCULAR===  # Shock secondary to hemorrhage, sepsis  - Resuscitation with blood, fluids overnight   - Vancomycin, zosyn   - GI aware, will see in AM  - Urology aware, will see in AM    ===PULMONARY===  # SAMUEL  Patient not on CPAP pta per family.     ===GASTROINTESTINAL===  #LGIB   - Transfuse >7  - Q4H Hgb  - Protonix gtt    # Recent history of clostridium difficile colitis  - Enteric precautions  - C diff testing given reported diarrhea    ===RENAL===  # Chronic kidney disease related to obstructive uropathy  Baseline creatinine is 0.6-0.9. Suspect worsening in the setting of anemia and shock.  - Strict I/Os  - Blood, fluids per above    ===HEME/ONC===  # Acute on chronic anemia with hemorrhagic shock  Baseline hemoglobins 6-8 at recent admission in [place], 7.5 at discharge. He reports bloody output from his nephrostomy as well as from his ostomy in the past several days.   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     ===ENDOCRINE===  No acute issues. Monitor for hypoglycemia in the  setting on NPO status.     ===INFECTIOUS DISEASE===  # Sepsis  - Blood cultures  - Urine culture  - Procalcitonin   - Vanco/zosyn.     ===SKIN/MSK===  # L5/S1 osteomyelitis vs tumor invasion  - Monitor    Prophylaxis:  DVT: SCDs given bleeding   GI: Pantoprazole gtt  Family:  Updated sister Yesy over the phone  Disposition: Critically Ill    Code Status: DNR/DNI, discussed with family Yesy    Patient was seen and discussed with attending physician Dr. Baltazar, who agrees with above assessment and plan.    Ludy Bower  Internal Medicine PGY-2  [id number]         Chief Complaint:   Blood in ostomy         History of Present Illness:   Edy Uribe is a 57 year old male with a history of stage IV bladder cancer complicated by obstructive uropathy s/p bilateral nephrostomy tubes, rectal obstruction s/p diverting ostomy who presented for blood coming from his left nephrostomy tube from nursing home.     Upon EMS arrival, patient was hypotensive with blood pressures about 84/52. He had a blood glucose of 155 and heart rate of 120 bpm per EMS. While en route to the ED he was given 600 units of fluid. EMS also note that patient was given oxycodone by nursing home staff just prior to leaving around 22:30 for his chronic back pain.     History from patient is limited from patient due to somnolence. He denies pain in chest, abdomen. He reports chronic back pain which is unchanged from baseline. He denies difficulty breathing. He reports the bleeding from ostomy and nephrostomy have been occurring for the past several days.          Review of Systems:   10 point ROS negative other than that noted in HPI.          Past Medical History:   Medical History reviewed.     1. 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 Rectal obstruction related to bladder mass and intrinsic compression, status post diverting colostomy  4.  Chronic anemia  5. L5/S1 osteomyelitis vs tumor invasion  6. Recent history of clostridium difficile colitis  7. SAMUEL         Past Surgical History:   Surgical History reviewed.     1. Bilateral nephrostomy tubes   2. TURP for prostate in 2017   3. Diverting colostomy for rectal obstruction 2/2 bladder mass 2/1/18         Social History:   Social History reviewed. Patient worked in the [employer] business.     Tobacco: Never smoker  Alcohol: No         Family History:   Family History reviewed. Positive congenital heart defect in late brother, parkinsonism in father.          Allergies:   No Known Allergies          Medications:   Medications Reviewed.   Current Outpatient Prescriptions   Medication Sig     Acetaminophen (TYLENOL PO) Take 500 mg by mouth every 8 hours as needed for mild pain or fever     ascorbic acid (VITAMIN C) 500 MG CPCR CR capsule Take 500 mg by mouth daily     ferrous sulfate (IRON) 325 (65 Fe) MG tablet Take by mouth daily (with breakfast)     lidocaine (LIDODERM) 5 % Patch Place 1 patch onto the skin every 24 hours     MAGNESIUM OXIDE PO Take 400 mg by mouth daily     METOPROLOL TARTRATE PO Take 12.5 mg by mouth 2 times daily     OXYCODONE HCL PO Take 5 mg by mouth every 3 hours as needed     Prochlorperazine Maleate (COMPAZINE PO) Take 10 mg by mouth every 6 hours as needed for nausea           Physical Exam:   Vitals were reviewed.  Blood pressure 91/53, pulse 114, temperature 99.4  F (37.4  C), temperature source Oral, resp. rate 23, weight 107.7 kg (237 lb 8 oz), SpO2 94 %.    General: Oriented to person, time, not place ('[place]'), NAD  Skin: Not jaundiced, no rash, no ecchymoses  HEENT: MMM, PERRLA, EOM intact  CV: RRR, normal S1S2, no murmur, clicks, rubs  Resp: Clear to auscultation bilaterally, no wheezes, rhonchi  Abd: Large, firm suprapubic mass involving both lower quadrants. Colostomy in LLQ with some (~10 mL) maroon output. Bilateral nephrostomy tubes, right draining clear  urine and left with hematuria. Non-tender abdomen. Soft in upper quadrants. +BS.   Extremities: Pale. Warm, palpable pulses, no edema  Neuro: No lateralizing symptoms or focal neurologic deficits         Data:        ROUTINE LABS (Last four results)  CMP  Recent Labs  Lab 05/27/18  2320      POTASSIUM 4.5   CHLORIDE 102   CO2 22   ANIONGAP 10   *   BUN 47*   CR 1.72*   GFRESTIMATED 41*   GFRESTBLACK 49*   KATHI 8.5   MAG 2.5*   PROTTOTAL 5.9*   ALBUMIN 1.5*   BILITOTAL 0.4   ALKPHOS 159*   AST 54*   ALT 27     CBC  Recent Labs  Lab 05/27/18  2320   WBC 13.5*   RBC 2.28*   HGB 5.5*   HCT 18.8*   MCV 83   MCH 24.1*   MCHC 29.3*   RDW 17.1*        INR  Recent Labs  Lab 05/27/18  2320   INR 1.27*     Arterial Blood GasNo lab results found in last 7 days.

## 2018-05-28 NOTE — ED TRIAGE NOTES
Pt arrived from [facility]. Staff noticed blood coming from his nephrostomy tube. He was hypotensive upon EMS arrival, and tachycardic. Pt is now 89/54 and heart rate 120. He has a hx of a personality disorder and got 5 mg oxycodone before leaving the nursing home. He has a hx of bladder cancer

## 2018-05-28 NOTE — PROGRESS NOTES
[facility]      MICU Progress Note  Edy Uribe MRN: [id number]  1960  Date of Admission:5/27/2018  Primary care provider: No Ref-Primary, Physician      Assessment and Plan:     Edy Uribe is a 57 year old male with a history of stage IV bladder cancer complicated by obstructive uropathy s/p bilateral nephrostomy tubes (currently receiving neoadjuvant chemo in hopes of surgical treatment), rectal obstruction s/p diverting ostomy who presented from nursing home with blood coming from his left nephrostomy tube and hypotension, admitted to MICU with shock (hemorrhagic vs septic) requiring pressor support this morning.     PLAN:  ===NEURO===  # AMS  Patient protecting his airway currently. ABG without hypercarbia overnight. Likely secondary to shock and will resuscitate as discussed below. He does not have focal neuro deficits.   - Monitor   - Repeat ABG this morning     ===CARDIOVASCULAR===  # Shock secondary to hemorrhage vs sepsis  - Resuscitated with 1.5L IVF overnight and 12.5g albumin, along with ~2u PRBC (concern for transfusion reaction as dicussed below).   - Will give additional 500cc LR bolus  - Levophed to maintain MAP>65  - Vancomycin, zosyn   - GI aware, will see in AM  - Urology aware, will see in AM    ===PULMONARY===  # SAMUEL  Patient not on CPAP per family    # Tachypnea  Likely 2/2 sepsis as discussed below. Will monitor closely. DNI.     ===GASTROINTESTINAL===  #LGIB   Maroon colored output from ostomy. May be secondary to invasive tumor burden.   - Transfuse >7  - Q4H Hgb  - Protonix gtt  - GI consulted as dicussed below     # Recent history of clostridium difficile colitis  - Enteric precautions  - C diff testing given reported diarrhea    ===RENAL===  # Chronic kidney disease related to obstructive uropathy  Baseline creatinine is 0.6-0.9. Suspect worsening in the setting of anemia and shock.  - Strict I/Os  - Blood, fluid resuscitation as discussed  above    ===HEME/ONC===  # Acute on chronic anemia with concern for hemorrhagic shock  Baseline hemoglobins 6-8 at recent admission in [place], 7.5 at discharge. He reported bloody output from his nephrostomy as well as from his ostomy in the past several days. Currently, having pink tinged output from left nephrostomy tube and maroon output from ostomy.   - Transfuse to Hgb >7 . 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 Unfortunately, we do not currently have these records. Per report, recently received neoadjuvant chemo in hopes of surgery.   - Consider heme/onc consultation, will work on obtaining outside records     ===ENDOCRINE===  No acute issues. FSG q4 hr. Monitor for hypoglycemia in the setting of NPO status.     ===INFECTIOUS DISEASE===  # Sepsis   Fevers as high as 103 recorded during blood transfusions. May be secondary to transfusion reaction vs sepsis. Source of sepsis currently unclear, although patient has significant risk of infection in the setting of large, invasive bladder cancer, s/p ostomy and bilateral nephrostomy tubes. CXR without focal consolidation.   - Lactic acid 2.4 , procalcitonin 11.5   - Blood cultures, urine culture pending  - Continue IV Vancomycin and zosyn       ===SKIN/MSK===  # L5/S1 osteomyelitis vs tumor invasion  - Monitor closely     Prophylaxis:  DVT: SCDs given bleeding   GI: Pantoprazole gtt  Family:  Updated sister Yesy over the phone  Disposition: Critically Ill    Code Status: DNR/DNI, discussed with family Yesy    Patient was seen and discussed with attending physician Dr. Perlman, who agrees with above assessment and  plan.    Queta Carmona MD  Internal Medicine, PGY1  [id number]        Interval History:   Overnight, patient received 1.5L IVF and 12.5g albumin, along with ~2 units pRBCs. Patient febrile during transfusions and 3rd unit was held. Patient becoming more hypotensive overnight, requiring central line placement and initiation of levophed. More lethargic this morning. Patient's sister updated and on her way in.          Past Medical History:   Medical History reviewed.   1. 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 Rectal obstruction related to bladder mass and intrinsic compression, status post diverting colostomy  4. Chronic anemia  5. L5/S1 osteomyelitis vs tumor invasion  6. Recent history of clostridium difficile colitis  7. SAMUEL         Past Surgical History:   Surgical History reviewed.   1. Bilateral nephrostomy tubes   2. TURP for prostate in 2017   3. Diverting colostomy for rectal obstruction 2/2 bladder mass 2/1/18         Allergies:   No Known Allergies   Possible transfusion reaction to pRBC 5/28         Medications:   Medications Reviewed.   Current Outpatient Prescriptions   Medication Sig     Acetaminophen (TYLENOL PO) Take 500 mg by mouth every 8 hours as needed for mild pain or fever     ascorbic acid (VITAMIN C) 500 MG CPCR CR capsule Take 500 mg by mouth daily     ferrous sulfate (IRON) 325 (65 Fe) MG tablet Take by mouth daily (with breakfast)     lidocaine (LIDODERM) 5 % Patch Place 1 patch onto the skin every 24 hours     MAGNESIUM OXIDE PO Take 400 mg by mouth daily     METOPROLOL TARTRATE PO Take 12.5 mg by mouth 2 times daily     OXYCODONE HCL PO Take 5 mg by mouth every 3 hours as needed     Prochlorperazine Maleate (COMPAZINE PO) Take 10 mg by mouth every 6 hours as needed for nausea           Physical Exam:   Vitals were reviewed.  Blood pressure (!) 86/50, pulse 114, temperature  "101.2  F (38.4  C), temperature source Axillary, resp. rate 18, height 1.885 m (6' 2.2\"), weight 104.9 kg (231 lb 4.2 oz), SpO2 100 %.    General: Oriented to person and time only, appears lethargic, pale   HEENT: MMM,  EOM intact  CV: tachycardic, normal S1S2, no murmur/rubs  Resp: Clear to auscultation bilaterally, no wheezes, rhonchi, tachypnic on nasal cannula   Abd: Large, firm suprapubic mass, nontender to palpation. Colostomy with maroon output. Bilateral nephrostomy tubes- right draining yellow urine and left with pink tinged output. Quiet bowel sounds.   Extremities: Warm and well perfused, palpable pulses, no LE edema  Neuro: A&Ox2, repeatedly asking for an \"orange popsicle\"   Skin: Not jaundiced, no rash, no ecchymoses appreciated, pale          Data:   I/O last 3 completed shifts:  In: 3290.08 [I.V.:1163; IV Piggyback:1500]  Out: 775 [Urine:775]    ROUTINE LABS (Last four results)  CMP    Recent Labs  Lab 05/28/18  0254 05/27/18  2320    134   POTASSIUM 4.5 4.5   CHLORIDE 104 102   CO2 24 22   ANIONGAP 10 10   * 146*   BUN 46* 47*   CR 1.55* 1.72*   GFRESTIMATED 46* 41*   GFRESTBLACK 56* 49*   KATHI 8.5 8.5   MAG  --  2.5*   PROTTOTAL 6.2* 5.9*   ALBUMIN 1.6* 1.5*   BILITOTAL 0.4 0.4   ALKPHOS 166* 159*   AST 52* 54*   ALT 29 27     CBC    Recent Labs  Lab 05/28/18  0607 05/28/18  0354 05/27/18  2320   WBC 11.3* 12.0* 13.5*   RBC 2.71* 2.62* 2.28*   HGB 6.9* 6.5* 5.5*   HCT 22.7* 21.7* 18.8*   MCV 84 83 83   MCH 25.5* 24.8* 24.1*   MCHC 30.4* 30.0* 29.3*   RDW 16.7* 16.9* 17.1*    396 421     INR    Recent Labs  Lab 05/28/18  0254 05/27/18  2320   INR 1.20* 1.27*     Arterial Blood Gas    Recent Labs  Lab 05/28/18  0258   PH 7.40   PCO2 35   PO2 76*   HCO3 21   O2PER 2L     Imaging:  CXR 5/28: low lung volumes, clear lungs    CT A/P 5/28/17 per prelim report:       1. Examination for malignancy of bleeding is limited by the lack of  intravenous contrast.  2a. Markedly abnormal " appearance of the urinary bladder containing  soft tissue density as well as foci of air which is likely due to a  combination of bladder malignancy and blood clot. The urinary bladder  is significantly distended up to 26.2 cm.  2b. Confluent with the bladder mass posteriorly is a soft tissue mass  measuring 9.7 x 4.5 x 13.4 cm, which appears contiguous with the  sigmoid colon and suspicious for invasion of the sigmoid colon. There  is a diverting colostomy in the left upper quadrant at the level of  the distal transverse colon.  2c. Erosion of the L5 vertebral body which is suspicious for  metastasis or local invasion of the L5 vertebral body from the urinary  bladder mass.  3. Hyperdense stool in the right colon and remaining transverse colon,  which is nonspecific and may be due to inspissated stool, excretion of  prior contrast, blood products, or a combination of both.  4. Bilateral percutaneous nephrostomy tubes with persistent moderate  hydronephrosis.  5. Cholelithiasis without evidence of cholecystitis.    Attending Note:    The patient was seen and examined by me and discussed at length with the resident. I have personally reviewed all labs, vital signs, imaging and culture results from the last 24 hours  I have read and agree with the resident note from today which reflects our joint findings, assessment and plan.    David Perlman, M.D.    Pulmonary, Allergy and Critical Care Medicine  [facility]

## 2018-05-28 NOTE — PROCEDURES
After obtaining verbal consent from sister the patient was prepped and draped in usual sterile fashion.  The are over the left subclavian vein was injected with 5cc of 1% lidocaine.  After waiting one minute, the left subclavian vein was entered with a hollow bore needle with brisk blood return which was venous in nature.  A TLC was inserted to 18 cm using the seldinger technique.  The wire was removed intact.  Blood was withdrawn from all three ports and flushed.  The line was sutured in place and dressed.  CXR was ordered and will be checked by the MICU team.  Patient tolerated the procedure well.    Edison Baltazar MD

## 2018-05-28 NOTE — PHARMACY-VANCOMYCIN DOSING SERVICE
Pharmacy Vancomycin Initial Note  Date of Service May 28, 2018  Patient's  1960  57 year old, male    Indication: Sepsis    Current estimated CrCl = Estimated Creatinine Clearance: 59.9 mL/min (based on Cr of 1.72).    Creatinine for last 3 days  2018: 11:20 PM Creatinine 1.72 mg/dL    Recent Vancomycin Level(s) for last 3 days  No results found for requested labs within last 72 hours.      Vancomycin IV Administrations (past 72 hours)      No vancomycin orders with administrations in past 72 hours.                Nephrotoxins and other renal medications (Future)    Start     Dose/Rate Route Frequency Ordered Stop    18 0400  vancomycin (VANCOCIN) 2,000 mg in sodium chloride 0.9 % 500 mL intermittent infusion      2,000 mg  over 2 Hours Intravenous EVERY 12 HOURS 18 0300      18 0330  piperacillin-tazobactam (ZOSYN) 4.5 g vial to attach to  mL bag      4.5 g  over 30 Minutes Intravenous EVERY 6 HOURS 18 0230            Contrast Orders - past 72 hours     None        Plan:  1.  Start vancomycin 2000 mg IV q12h.   2.  Goal Trough Level: 15-20 mg/L   3.  Pharmacy will check trough levels as appropriate in 1-3 Days.    4. Serum creatinine levels will be ordered daily for the first week of therapy and at least twice weekly for subsequent weeks.    5. Hatch method utilized to dose vancomycin therapy: Method 2    Luci Dhillon, PharmD, BCPS

## 2018-05-28 NOTE — PLAN OF CARE
Problem: Patient Care Overview  Goal: Plan of Care/Patient Progress Review  Outcome: Declining  D:  Patient admitted from ED to MICU for bleeding from nephrostomy tube, Hgb 5.5.     I/A: Patient lethargic, confused.  Intermittently able to answer orientation questions correctly.  PERRLA.  Tachycardic 110-120.  Pressures soft.  Given 2 U PRBC, started third and patient had temp increase from 99.2-100.4.  Called blood blank and team to notify.  Labs ordered. Blood sent back to blood bank.  Patient given 1.5 units fluids overnight and 12.5 g albumin for low pressures.  Required trendelenberg positioning most of this shift.  Attending MD called sister for consent for line placement for pressors because MAPs continued to be low despite blood/fluids/albumin/positioning.  Line placed with no complications, awaiting results of STAT CXR.  Nephrostomy tubes remain in place bilaterally.  Left side originally draining linh red blood, now with blood tinged output.  Right side draining yellow/orange with large amount sediment.  Both sides flush with no problem.  Of note, patient abdomen above bladder increasingly distended and firm throughout shift, painful.  Team notified.  Patient with small bloody output from penis.  Team aware.     P: Continue with current plan of care.  Pressors if needed to maintain MAPs per patient/sister.  Palliative to see.  Continue to monitor and assess and notify team of any changes.

## 2018-05-28 NOTE — ED PROVIDER NOTES
History     Chief Complaint   Patient presents with     Hypotension     Hematuria     HPI  Edy Uribe is a 57 year old male with a history of stage IV bladder cancer, obstructive uropathy status post 2 nephrostomy tubes in place. Patient is currently getting neoadjuvant chemotherapy in the hopes of surgical treatment of his bladder cancer. He was recently in [facility] where he was diagnosed with C. difficile colitis and was placed on oral vancomycin. His nephrostomy tubes were replaced during that stay, which was approximately 2 months ago before being transferred to a nursing home. He was admitted again to the same hospital on 4/18/18 because his nephrostomy tubes were not draining. Output from the nephrostomy tubes were noted to decrease over the span of 1-2 days. His creatinine was noted to be elevated to 4.35 but his renal function was previously known to be normal. He had a nephrostomy replacement which improved his creatinine. He had a UTI which was also treated while there. He had a CT scan which indicated a distended bladder with increased density material likely clot although there could be a component of tumor. Patient was discharged to his current nursing home on 5/22/18 which is closer to his family who live in the [place] metropolitan area, although he is from [place].       Patient presents to the Emergency Department for evaluation from [facility] by ambulance after staff noticed blood coming from his left nephrostomy tube around 1:00 pm today. Upon EMS arrival, patient was hypotensive with blood pressures about 84/52. He had a blood glucose of 155 and heart rate of 120 bpm per EMS. While en route to the ED he was given 600 units of fluid. EMS also note that patient was given 500 mg of oxycodone by nursing home staff just prior to leaving around 22:30 for his chronic back pain. Patient himself denies previous history of bleeding problems.      I have  reviewed the Medications, Allergies, Past Medical and Surgical History, and Social History in the [facility] system.    PAST MEDICAL HISTORY: No past medical history on file.    PAST SURGICAL HISTORY: No past surgical history on file.    FAMILY HISTORY: No family history on file.    SOCIAL HISTORY:   Social History   Substance Use Topics     Smoking status: Not on file     Smokeless tobacco: Not on file     Alcohol use Not on file     Current Facility-Administered Medications   Medication     0.9% sodium chloride BOLUS     0.9% sodium chloride BOLUS     acetaminophen (TYLENOL) tablet 650 mg    Or     acetaminophen (TYLENOL) Suppository 650 mg     albumin human 25 % injection 25 g     HYDROmorphone (DILAUDID) injection 0.2 mg     lidocaine (LMX4) kit     lidocaine 1 % 1 mL     naloxone (NARCAN) injection 0.1-0.4 mg     norepinephrine (LEVOPHED) 16 mg in D5W 250 mL infusion     pantoprazole (PROTONIX) 80 mg in sodium chloride 0.9 % 100 mL infusion     piperacillin-tazobactam (ZOSYN) 4.5 g vial to attach to  mL bag     sodium chloride (PF) 0.9% PF flush 3 mL     sodium chloride (PF) 0.9% PF flush 3 mL     sodium chloride 0.9% infusion     vancomycin (VANCOCIN) 2,000 mg in sodium chloride 0.9 % 500 mL intermittent infusion      No Known Allergies    Review of Systems   Constitutional: Positive for activity change, appetite change (decrease) and fatigue. Negative for fever.        Patient recently placed from [facility] to [facility] approximately 5 days ago.   HENT: Negative for congestion, nosebleeds, sore throat and trouble swallowing.    Eyes: Negative for redness and visual disturbance.   Respiratory: Negative for cough, chest tightness and shortness of breath.    Cardiovascular: Positive for leg swelling (chronic). Negative for chest pain.   Gastrointestinal: Positive for blood in stool (noted in ostomy bag  new to patient). Negative for abdominal pain, nausea and vomiting.   Genitourinary: Positive  "for difficulty urinating. Negative for flank pain.        Positive for bleeding from left nephrostomy tube.   Musculoskeletal: Positive for back pain (chronic hx of osteo/discitis) and gait problem. Negative for arthralgias, joint swelling, neck pain and neck stiffness.   Skin: Negative for color change and rash.   Allergic/Immunologic: Negative for immunocompromised state.   Neurological: Positive for weakness. Negative for seizures, syncope, light-headedness and headaches.   Hematological: Does not bruise/bleed easily (not currently on lovenox).   Psychiatric/Behavioral: Positive for decreased concentration and dysphoric mood. Negative for confusion and hallucinations.   All other systems reviewed and are negative.      Physical Exam   BP: (!) 89/54  Pulse: 120  Heart Rate: 118  Temp: 98.1  F (36.7  C)  Resp: 22  Height: 188.5 cm (6' 2.2\")  Weight: 107.7 kg (237 lb 8 oz)  SpO2: 99 %      Physical Exam   Constitutional: He appears well-developed and well-nourished. He appears distressed.   Patient is in minimal distress this point is not writhing in pain at all.  Is alert and oriented to person and place.   HENT:   Head: Normocephalic and atraumatic.   Mucous membranes are dry.  No sign of any bleeding around the mouth nose etc.   Eyes: EOM are normal. Pupils are equal, round, and reactive to light. No scleral icterus.   Neck: Normal range of motion. Neck supple. No JVD present.   Cardiovascular: Regular rhythm.    Sinus tachycardia noted.   Pulmonary/Chest: No stridor. No respiratory distress. He has no wheezes. He has no rales. He exhibits no tenderness.   Abdominal: He exhibits mass. There is no tenderness. There is no rebound.   Patient's upper abdomen is soft.  Patient's ostomy is noted to have gross blood in the bag which is new which is guaiac positive.  Patient has large lower mid pelvic mass consistent with bladder cancer history.  No tenderness with this at all is nonpulsatile.   Musculoskeletal: He " exhibits edema. He exhibits no tenderness.   Patient with bilateral percutaneous nephrostomy drains the right one has saul colored urine the left has gross hematuria noted in this.  There is no pain or swelling at the site of insertion in the CVA region bilateral.   Neurological: He is alert. He has normal reflexes.   Patient is oriented to person place at this point.  No obvious neurological focal findings noted   Skin: Skin is warm and dry. No rash noted. He is not diaphoretic. No erythema. No pallor.   Psychiatric:   Mildly flattened but appropriate here in the ER.   Nursing note and vitals reviewed.      ED Course     ED Course     Procedures         Patient evaluated in the ER.  As noted initially no history was available I talked to his sister Yesy to verify that he is full code.  Records were able to review from [employer] via [employer].  Recent hospitalization April of this year.  Patient with complex history metastatic bladder cancer with bowel obstructive pictures and renal obstruction with renal failure etc.  Patient's vital signs were monitored  And remained somewhat stable in the ER with pressures in the 90s heart rate is come down to the 1 teens.  Clinically patient was stable without decompensation in the ER.  2 large-bore IVs were placed IV fluid normal saline boluses given for fluid resuscitation ER.  As noted patient's ostomy bag has gross blood in this which is guaiac positive concerning for some GI bleed etiology.  Patient's laboratory testing revealed hemoglobin of 5.5 white count 13.5.  Urinalysis was sent showing many white cells red cells.  Patient's creatinine 1.72.  Potassium 4.5 glucose 146.  BUN is 47 alk phos 159 AST 54 total bilirubin 0.4 troponin negative.  Patient type and cross for 4 unit packed red cells.  Patient was consented also and agreed for transfusion.  Discussed case with ICU attending along with GI also who is aware the patient.  Patient will be transfused.  Patient will be  admitted to ICU for ongoing management of concern for hypotension tachycardia with hemoglobin 5.5 with signs of acute GI bleeding.  Patient started on Protonix bolus and continuous infusion IV also.  Chest x-ray done reveals no acute infiltrate heart size normal no mediastinal widening.  No large effusions or infiltrates.    Patient prior to admit to ICU had a noncontrast CT scan of the abdomen pelvis.  Findings did not report any acute bleeding into the abdomen with this.  Please see report for further findings that appear to be chronic.    As noted patient does not have a fever here appears otherwise stable relatively mildly hypotensive but looking back patient's blood pressure ranges approximately 100 or so also.  I do not see any sign of infection at this point more likely symptoms are reflective of decreased p.o. intake along with acute on chronic anemia with concerns for GI blood loss.             EKG Interpretation:      Interpreted by Christopher Vincent  Time reviewed: 2330  Symptoms at time of EKG: hypotension   Rhythm:sinus tach   Rate: 118  Axis: normal  Ectopy: none  Conduction: normal  ST Segments/ T Waves: No hyperacute ST-T wave changes  Q Waves: none  Comparison to prior: No old EKG available    Clinical Impression:sinus tachycardia          Critical Care time:  was 60 minutes for this patient excluding procedures.            Labs Ordered and Resulted from Time of ED Arrival Up to the Time of Departure from the ED   CBC WITH PLATELETS DIFFERENTIAL - Abnormal; Notable for the following:        Result Value    WBC 13.5 (*)     RBC Count 2.28 (*)     Hemoglobin 5.5 (*)     Hematocrit 18.8 (*)     MCH 24.1 (*)     MCHC 29.3 (*)     RDW 17.1 (*)     Absolute Neutrophil 12.4 (*)     Absolute Lymphocytes 0.6 (*)     All other components within normal limits   INR - Abnormal; Notable for the following:     INR 1.27 (*)     All other components within normal limits   COMPREHENSIVE METABOLIC PANEL - Abnormal;  Notable for the following:     Glucose 146 (*)     Urea Nitrogen 47 (*)     Creatinine 1.72 (*)     GFR Estimate 41 (*)     GFR Estimate If Black 49 (*)     Albumin 1.5 (*)     Protein Total 5.9 (*)     Alkaline Phosphatase 159 (*)     AST 54 (*)     All other components within normal limits   MAGNESIUM - Abnormal; Notable for the following:     Magnesium 2.5 (*)     All other components within normal limits   ROUTINE UA WITH MICROSCOPIC REFLEX TO CULTURE - Abnormal; Notable for the following:     Blood Urine Moderate (*)     Protein Albumin Urine 100 (*)     Leukocyte Esterase Urine Large (*)     WBC Urine >182 (*)     RBC Urine 112 (*)     WBC Clumps Present (*)     Bacteria Urine Many (*)     Yeast Urine Few (*)     Mucous Urine Present (*)     Hyaline Casts 33 (*)     All other components within normal limits   UA MACROSCOPIC WITH REFLEX TO MICRO AND CULTURE - Abnormal; Notable for the following:     Blood Urine Large (*)     Protein Albumin Urine 300 (*)     Nitrite Urine Positive (*)     Leukocyte Esterase Urine Moderate (*)     RBC Urine 1923 (*)     WBC Urine 2853 (*)     WBC Clumps Present (*)     Bacteria Urine Many (*)     Yeast Urine Moderate (*)     Transitional Epi 2 (*)     Mucous Urine Present (*)     Hyaline Casts 31 (*)     Granular Casts 31 (*)     All other components within normal limits   PARTIAL THROMBOPLASTIN TIME   TROPONIN I   PULSE OXIMETRY NURSING   CARDIAC CONTINUOUS MONITORING   PERIPHERAL IV CATHETER   IV ACCESS   ABO/RH TYPE AND SCREEN   RED BLOOD CELL PREPARE ORDER UNIT   BLOOD COMPONENT   URINE CULTURE AEROBIC BACTERIAL     Results for orders placed or performed during the hospital encounter of 05/27/18   XR Chest Port 1 View    Narrative    RESIDENT PRELIMINARY REPORT - The following report is a preliminary  interpretation.   Low lung volumes with bibasilar and perihilar atelectasis.   CT Abdomen Pelvis w/o Contrast    Narrative    RESIDENT PRELIMINARY REPORT - The following report  is a preliminary  interpretation.      1. Examination for malignancy of bleeding is limited by the lack of  intravenous contrast.  2a. Markedly abnormal appearance of the urinary bladder containing  soft tissue density as well as foci of air which is likely due to a  combination of bladder malignancy and blood clot. The urinary bladder  is significantly distended up to 26.2 cm.  2b. Confluent with the bladder mass posteriorly is a soft tissue mass  measuring 9.7 x 4.5 x 13.4 cm, which appears contiguous with the  sigmoid colon and suspicious for invasion of the sigmoid colon. There  is a diverting colostomy in the left upper quadrant at the level of  the distal transverse colon.  2c. Erosion of the L5 vertebral body which is suspicious for  metastasis or local invasion of the L5 vertebral body from the urinary  bladder mass.  3. Hyperdense stool in the right colon and remaining transverse colon,  which is nonspecific and may be due to inspissated stool, excretion of  prior contrast, blood products, or a combination of both.  4. Bilateral percutaneous nephrostomy tubes with persistent moderate  hydronephrosis.  5. Cholelithiasis without evidence of cholecystitis.   XR Chest Port 1 View    Narrative    Chest radiograph one view  5/28/2018 7:58 AM      HISTORY: New central line placement    COMPARISON: 5/28/2018    FINDINGS: Left PICC tip projects over the lower SVC. Cardiac  silhouette is unchanged. Unchanged perihilar opacities. No pleural  effusion or pneumothorax.      Impression    IMPRESSION:   1. Left PICC tip projects over the lower SVC. No pneumothorax.  2. Unchanged perihilar opacities.   CBC with platelets differential   Result Value Ref Range    WBC 13.5 (H) 4.0 - 11.0 10e9/L    RBC Count 2.28 (L) 4.4 - 5.9 10e12/L    Hemoglobin 5.5 (LL) 13.3 - 17.7 g/dL    Hematocrit 18.8 (L) 40.0 - 53.0 %    MCV 83 78 - 100 fl    MCH 24.1 (L) 26.5 - 33.0 pg    MCHC 29.3 (L) 31.5 - 36.5 g/dL    RDW 17.1 (H) 10.0 - 15.0 %     Platelet Count 421 150 - 450 10e9/L    Diff Method Automated Method     % Neutrophils 91.6 %    % Lymphocytes 4.5 %    % Monocytes 3.0 %    % Eosinophils 0.4 %    % Basophils 0.1 %    % Immature Granulocytes 0.4 %    Nucleated RBCs 0 0 /100    Absolute Neutrophil 12.4 (H) 1.6 - 8.3 10e9/L    Absolute Lymphocytes 0.6 (L) 0.8 - 5.3 10e9/L    Absolute Monocytes 0.4 0.0 - 1.3 10e9/L    Absolute Eosinophils 0.1 0.0 - 0.7 10e9/L    Absolute Basophils 0.0 0.0 - 0.2 10e9/L    Abs Immature Granulocytes 0.1 0 - 0.4 10e9/L    Absolute Nucleated RBC 0.0    Partial thromboplastin time   Result Value Ref Range    PTT 29 22 - 37 sec   INR   Result Value Ref Range    INR 1.27 (H) 0.86 - 1.14   Comprehensive metabolic panel   Result Value Ref Range    Sodium 134 133 - 144 mmol/L    Potassium 4.5 3.4 - 5.3 mmol/L    Chloride 102 94 - 109 mmol/L    Carbon Dioxide 22 20 - 32 mmol/L    Anion Gap 10 3 - 14 mmol/L    Glucose 146 (H) 70 - 99 mg/dL    Urea Nitrogen 47 (H) 7 - 30 mg/dL    Creatinine 1.72 (H) 0.66 - 1.25 mg/dL    GFR Estimate 41 (L) >60 mL/min/1.7m2    GFR Estimate If Black 49 (L) >60 mL/min/1.7m2    Calcium 8.5 8.5 - 10.1 mg/dL    Bilirubin Total 0.4 0.2 - 1.3 mg/dL    Albumin 1.5 (L) 3.4 - 5.0 g/dL    Protein Total 5.9 (L) 6.8 - 8.8 g/dL    Alkaline Phosphatase 159 (H) 40 - 150 U/L    ALT 27 0 - 70 U/L    AST 54 (H) 0 - 45 U/L   Magnesium   Result Value Ref Range    Magnesium 2.5 (H) 1.6 - 2.3 mg/dL   Troponin I   Result Value Ref Range    Troponin I ES <0.015 0.000 - 0.045 ug/L   UA with Microscopic reflex to Culture   Result Value Ref Range    Color Urine Yellow     Appearance Urine Cloudy     Glucose Urine Negative NEG^Negative mg/dL    Bilirubin Urine Negative NEG^Negative    Ketones Urine Negative NEG^Negative mg/dL    Specific Gravity Urine 1.016 1.003 - 1.035    Blood Urine Moderate (A) NEG^Negative    pH Urine 5.5 5.0 - 7.0 pH    Protein Albumin Urine 100 (A) NEG^Negative mg/dL    Urobilinogen mg/dL Normal 0.0 -  2.0 mg/dL    Nitrite Urine Negative NEG^Negative    Leukocyte Esterase Urine Large (A) NEG^Negative    Source Catheterized Urine     WBC Urine >182 (H) 0 - 5 /HPF    RBC Urine 112 (H) 0 - 2 /HPF    WBC Clumps Present (A) NEG^Negative /HPF    Bacteria Urine Many (A) NEG^Negative /HPF    Yeast Urine Few (A) NEG^Negative /HPF    Mucous Urine Present (A) NEG^Negative /LPF    Hyaline Casts 33 (H) 0 - 2 /LPF   UA reflex to Microscopic and Culture   Result Value Ref Range    Color Urine Dark Red     Appearance Urine Cloudy     Glucose Urine Negative NEG^Negative mg/dL    Bilirubin Urine Negative NEG^Negative    Ketones Urine Negative NEG^Negative mg/dL    Specific Gravity Urine 1.020 1.003 - 1.035    Blood Urine Large (A) NEG^Negative    pH Urine 6.0 5.0 - 7.0 pH    Protein Albumin Urine 300 (A) NEG^Negative mg/dL    Urobilinogen mg/dL Normal 0.0 - 2.0 mg/dL    Nitrite Urine Positive (A) NEG^Negative    Leukocyte Esterase Urine Moderate (A) NEG^Negative    Source Catheterized Urine     RBC Urine 1923 (H) 0 - 2 /HPF    WBC Urine 2853 (H) 0 - 5 /HPF    WBC Clumps Present (A) NEG^Negative /HPF    Bacteria Urine Many (A) NEG^Negative /HPF    Yeast Urine Moderate (A) NEG^Negative /HPF    Transitional Epi 2 (H) 0 - 1 /HPF    Mucous Urine Present (A) NEG^Negative /LPF    Hyaline Casts 31 (H) 0 - 2 /LPF    Granular Casts 31 (A) NEG^Negative /LPF   Comprehensive metabolic panel   Result Value Ref Range    Sodium 138 133 - 144 mmol/L    Potassium 4.5 3.4 - 5.3 mmol/L    Chloride 104 94 - 109 mmol/L    Carbon Dioxide 24 20 - 32 mmol/L    Anion Gap 10 3 - 14 mmol/L    Glucose 113 (H) 70 - 99 mg/dL    Urea Nitrogen 46 (H) 7 - 30 mg/dL    Creatinine 1.55 (H) 0.66 - 1.25 mg/dL    GFR Estimate 46 (L) >60 mL/min/1.7m2    GFR Estimate If Black 56 (L) >60 mL/min/1.7m2    Calcium 8.5 8.5 - 10.1 mg/dL    Bilirubin Total 0.4 0.2 - 1.3 mg/dL    Albumin 1.6 (L) 3.4 - 5.0 g/dL    Protein Total 6.2 (L) 6.8 - 8.8 g/dL    Alkaline Phosphatase 166 (H)  40 - 150 U/L    ALT 29 0 - 70 U/L    AST 52 (H) 0 - 45 U/L   INR   Result Value Ref Range    INR 1.20 (H) 0.86 - 1.14                 Procalcitonin   Result Value Ref Range    Procalcitonin 11.54 (HH) ng/ml   Blood gas arterial   Result Value Ref Range    pH Arterial 7.40 7.35 - 7.45 pH    pCO2 Arterial 35 35 - 45 mm Hg    pO2 Arterial 76 (L) 80 - 105 mm Hg    Bicarbonate Arterial 21 21 - 28 mmol/L    Base Deficit Art 3.2 mmol/L    FIO2 2L    CBC (AM Draw)   Result Value Ref Range    WBC 12.0 (H) 4.0 - 11.0 10e9/L    RBC Count 2.62 (L) 4.4 - 5.9 10e12/L    Hemoglobin 6.5 (LL) 13.3 - 17.7 g/dL    Hematocrit 21.7 (L) 40.0 - 53.0 %    MCV 83 78 - 100 fl    MCH 24.8 (L) 26.5 - 33.0 pg    MCHC 30.0 (L) 31.5 - 36.5 g/dL    RDW 16.9 (H) 10.0 - 15.0 %    Platelet Count 396 150 - 450 10e9/L   CBC with platelets   Result Value Ref Range    WBC 11.3 (H) 4.0 - 11.0 10e9/L    RBC Count 2.71 (L) 4.4 - 5.9 10e12/L    Hemoglobin 6.9 (LL) 13.3 - 17.7 g/dL    Hematocrit 22.7 (L) 40.0 - 53.0 %    MCV 84 78 - 100 fl    MCH 25.5 (L) 26.5 - 33.0 pg    MCHC 30.4 (L) 31.5 - 36.5 g/dL    RDW 16.7 (H) 10.0 - 15.0 %    Platelet Count 377 150 - 450 10e9/L   EKG 12 lead   Result Value Ref Range    Interpretation ECG Click View Image link to view waveform and result    ABO/Rh type and screen   Result Value Ref Range    Units Ordered 4     ABO A     RH(D) Pos     Antibody Screen Neg     Test Valid Only At          [facility]    Specimen Expires 05/30/2018     Crossmatch Red Blood Cells    Blood component   Result Value Ref Range    Unit Number [id number]     Blood Component Type Red Blood Cells Leukocyte Reduced     Division Number 00     Status of Unit Released to care unit 05/28/2018 0506     Blood Product Code R0024P53     Unit Status ISS    Blood component   Result Value Ref Range    Unit Number [id number]     Blood Component Type Red Blood Cells Leukocyte Reduced     Division Number 00      Status of Unit Released to care unit 05/28/2018 0358     Blood Product Code F4906P50     Unit Status ISS    Blood component   Result Value Ref Range    Unit Number [id number]     Blood Component Type Red Blood Cells Leukocyte Reduced     Division Number 00     Status of Unit Ready for patient 05/28/2018 0018     Blood Product Code C2575E96     Unit Status [facility]IT    Blood component   Result Value Ref Range    Unit Number [id number]     Blood Component Type Red Blood Cells Leukocyte Reduced     Division Number 00     Status of Unit Ready for patient 05/28/2018 0739     Blood Product Code Q7860G94     Unit Status [facility]ENOIT      *Note: Due to a large number of results and/or encounters for the requested time period, some results have not been displayed. A complete set of results can be found in Results Review.              Assessments & Plan (with Medical Decision Making)  57-year-old male history of metastatic latter cancer who is still full code presents from [facility] with low blood pressure with blood and left percutaneous nephrostomy drain.  History initially was limited as we did not have records available I did talk to patient's sister after he okayed this and she did give some history and agreed that he was still full code.  Patient evaluation ER had 2 large-bore IVs placed IV fluid resuscitation.  Hemoglobin noted be 5.5.  Patient's abdomen although the upper abdomen is soft is not complaining abdominal pain he has a ostomy was placed in February this year.  There is gross blood noted in that concerning of some GI bleeding etiology.  Patient's hemoglobin is 5.5 he was consented and type and cross for 4 units and 2 units were ordered to be transfused.  Patient started on Protonix no history of varices noted.  EKG did not show ischemic changes some mild tachycardia noted chest x-ray without any acute changes noted.  Patient had a noncontrast CT as he has chronic kidney disease findings did not show  any acute blood in the abdomen or acute obstructive pictures free air etc.  As noted patient was treated with Protonix IV also discussed with GI discussed with ICU patient then admitted to ICU for ongoing management of hypotensive patient with signs of GI bleeding with low hemoglobin requiring transfusion.  No sign of obvious sepsis seen at this point.  Patient also has decreased p.o. intake as is been at the care unit for approximate 5 days.  As noted patient given fluid resuscitation ER also prior.       I have reviewed the nursing notes.    I have reviewed the findings, diagnosis, plan and need for follow up with the patient.    Current Discharge Medication List          Final diagnoses:   GI bleed   Hypotension due to blood loss   EVERTON (acute kidney injury) (H)   Malignant neoplasm of urinary bladder, unspecified site (H)   Hematuria, gross   Nephrostomy tube bleed (H)   Dehydration     I, Mary Block, am serving as a trained medical scribe to document services personally performed by Christopher Vincent MD, based on the provider's statements to me.   I,Christopher Vincent MD, was physically present and have reviewed and verified the accuracy of this note documented by Mary Block.    5/27/2018   [facility] EMERGENCY DEPARTMENT    This note was created at least in part by the use of dragon voice dictation system. Inadvertent typographical errors may still exist.  Christopher Vincent MD.         Christopher Vincent MD  05/28/18 9508

## 2018-05-28 NOTE — CONSULTS
GASTROENTEROLOGY CONSULTATION      Date of Admission:  5/27/2018          ASSESSMENT AND RECOMMENDATIONS:   ASSESSMENT:  57 year old male with a history of extensive primary bladder carcinoma with sarcomatoid changes & extensive rhabdomyoblastic differentiation c/w rectal obstruction from the bladder mass and extrinsic compression requiring an emergent diverting colostomy and obstructive uropathy (s/p B/L PNTs) - currently on neoadjuvant chemotherapy and has only received once cycle of carboplatin and gemcitabine (held due to overall functional decline as well as C Diff infection), hx of C Diff colitis requiring a prolonged course of PO vancomycin, L5/S1 OM, SAMUEL who was initially admitted to the hospital for c/o bloody output from the nephrostomy tube. On arrival, he was noted to be in shock (hypotensive and tachycardic) thought to be septic vs hemorrhagic and patient was transferred to the ICU (required central line placement and vasopressors overnight, currently on low dose norepinpehrine). Gastroenterology team has been consulted due to concern of a bloody output from his colostomy and concern for hemorrhagic shock.   Labs reveal mild leukocytosis to 13k, Hb of 5.5 on admission that corrected to 7.1 with 2U PRBC (concern for a febrile hemolytic transfusion reaction with the 3U) and then trended slightly down to 6.6 today. Procalcitonin elevated to 11.54. Mild EVERTON with a Cr of 1.55.     Patient does not seem to have a significant hx of any ongoing GI bleeding. He denies any worsening abdominal pain but can have a recurrence of his C Diff infection (2 weeks post treatment is the peak time for recurrence). Would not favor a colonoscopic evaluation at this time without evidence of significant ongoing GI bleeding.     As pointed out by my colleague who wrote the brief note overnight, differential diagnosis for Mr. Uribe's bloody ostomy output is broad and can include direct tumor invasion of surrounding  structures +/- vascular involvement (would be concerned for this given nephrostomy bleeding), stomal irritation, C difficile infection; less likely stomal varices (no h/o portal hypertension), brisk upper GI bleed, AVM, etc.      RECOMMENDATIONS:  - Reasonable to continue pantoprazole infusion for possible UGIB.  - Maintain 2 large bore IVs and active type and cross.  - Please check for C Diff PCR and stool enteropathogens.  - Continue to trend H&h and transfuse for Hb > 7.  - Agree with urological evaluation for bleeding from PNTs.  - Agree with primary team's evaluation for sources for potential septic shock in light of an elevated procalcitonin.  - Will consider a endoscopic evaluation (EGD +/- colonoscopy) if patient has convincing evidence of ongoing GI bleeding leading to hemodynamic compromise.     Gastroenterology team will continue to follow with you. Thank you for involving us in this patient's care. Please do not hesitate to contact the GI service with any questions or concerns.   Patient care plan has been discussed with Dr. Rojas, GI staff physician.    Angelique Stewart  Gastroenterology Fellow  P [phone]  -------------------------------------------------------------------------------------------------------------------          Chief Complaint:   We were asked by Queta Rubalcava of the MICU to evaluate this patient with bloody output from his colostomy.  History is obtained from the patient and the medical record.          History of Present Illness:   Edy Uribe is a 57 year old male with a history of extensive primary bladder carcinoma with sarcomatoid changes & extensive rhabdomyoblastic differentiation c/w rectal obstruction from the bladder mass and extrinsic compression requiring an emergent diverting colostomy and obstructive uropathy (s/p B/L PNTs) - currently on neoadjuvant chemotherapy and has only received once cycle of carboplatin and gemcitabine (held due to overall functional  "decline as well as C Diff infection), hx of C Diff colitis requiring a prolonged course of PO vancomycin, L5/S1 OM, SAMUEL who was initially admitted to the hospital for c/o bloody output from the nephrostomy tube. On arrival, he was noted to be in shock (hypotensive and tachycardic) thought to be septic vs hemorrhagic and patient was transferred to the ICU (required central line placement and vasopressors overnight, currently on low dose norepinpehrine). Gastroenterology team has been consulted due to concern of a bloody output from his colostomy and concern for hemorrhagic shock.     Patient accompanied by several family members at bedside today who were able to provide most of the history. Patient primarily reports bleeding from the nephrostomy tubes (\"significant bleeding which seems to have subsided\"). He has also noticed maroon colored output in his colostomy which he reports as small amounts. He has noticed similar symptoms in the past with his C Diff infections and thinks this may be a recurrence of his C diff colitis.  Labs reveal mild leukocytosis to 13k, Hb of 5.5 on admission that corrected to 7.1 with 2U PRBC (concern for a febrile hemolytic transfusion reaction with the 3U) and then trended slightly down to 6.6 today. Procalcitonin elevated to 11.54. Mild EVERTON with a Cr of 1.55. He has been started on a pantoprazole infusion for concern for an UGIB. He has also been started on broad spectrum antibiotics for possible sepsis.     No prior hx of colonoscopy.   No family hx of colorectal malignancy.  He does have a hx of recent C Diff infection though hard to ascertain from records about the number of episodes and duration of treatment. He was put on a long tapering course of PO vancomycin which he completed a week ago as per the family members.            Past Medical History:   Reviewed and edited as appropriate  1. 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 Rectal obstruction related to bladder mass and intrinsic compression, status post diverting colostomy  4. Chronic anemia  5. L5/S1 osteomyelitis vs tumor invasion  6. Recent history of clostridium difficile colitis  7. SAMUEL         Past Surgical History:   Reviewed and edited as appropriate   1. Bilateral nephrostomy tubes   2. TURP for prostate in 2017   3. Diverting colostomy for rectal obstruction 2/2 bladder mass 2/1/18         Previous Endoscopy:   - No prior endoscopy         Social History:   Reviewed and edited as appropriate:  Marriage Status: single  Tobacco: Never smoker  Alcohol: No         Family History:   Reviewed and edited as appropriate  No known history of gastrointestinal/liver disease or  gastrointestinal malignancies       Allergies:   Reviewed and edited as appropriate   No Known Allergies         Medications:     Current Facility-Administered Medications   Medication     0.9% sodium chloride BOLUS     0.9% sodium chloride BOLUS     acetaminophen (TYLENOL) tablet 650 mg    Or     acetaminophen (TYLENOL) Suppository 650 mg     albumin human 25 % injection 25 g     lidocaine (LMX4) kit     lidocaine 1 % 1 mL     naloxone (NARCAN) injection 0.1-0.4 mg     norepinephrine (LEVOPHED) 16 mg in D5W 250 mL infusion     oxyCODONE IR (ROXICODONE) tablet 5 mg     pantoprazole (PROTONIX) 80 mg in sodium chloride 0.9 % 100 mL infusion     piperacillin-tazobactam (ZOSYN) 4.5 g vial to attach to  mL bag     sodium chloride (PF) 0.9% PF flush 3 mL     sodium chloride (PF) 0.9% PF flush 3 mL     sodium chloride 0.9% infusion     vancomycin (VANCOCIN) 2,000 mg in sodium chloride 0.9 % 500 mL intermittent infusion             Review of Systems:   A complete review of systems was performed and is negative except as noted in the HPI           Physical Exam:   BP 97/58  Pulse 114  Temp 97.1  F (36.2  C) (Axillary)  Resp 18  Ht 1.885 m (6' 2.2\")  Wt " 104.9 kg (231 lb 4.2 oz)  SpO2 91%  BMI 29.53 kg/m2  Wt:   Wt Readings from Last 2 Encounters:   05/28/18 104.9 kg (231 lb 4.2 oz)      Constitutional: not dyspneic/diaphoretic, no acute distress  Eyes: Sclera anicteric/injected  Ears/nose/mouth/throat: Normal oropharynx without ulcers or exudate, mucus membranes moist, hearing intact  Neck: supple, thyroid normal size  CV: No edema  Respiratory: Unlabored breathing  Lymph: No axillary, submandibular, supraclavicular or inguinal lymphadenopathy  Abd: Large suprapubic mass, Colostomy in LLQ with maroon colored output, no hepatosplenomegaly, Mild TTP in upper quadrants, no peritoneal signs. B/L PNTs in place draining pinkish tinged urine.  Skin: warm, perfused, no jaundice  Neuro: AAO x 2, No asterixis  Psych: Normal affect  MSK: No gross deformities         Data:   Labs and imaging below were independently reviewed and interpreted    BMP  Recent Labs  Lab 05/28/18 0254 05/27/18 2320    134   POTASSIUM 4.5 4.5   CHLORIDE 104 102   KATHI 8.5 8.5   CO2 24 22   BUN 46* 47*   CR 1.55* 1.72*   * 146*     CBC  Recent Labs  Lab 05/28/18  1142  05/28/18  0607 05/28/18  0354 05/27/18 2320   WBC  --   --  11.3* 12.0* 13.5*   RBC  --   --  2.71* 2.62* 2.28*   HGB 6.6*  < > 6.9* 6.5* 5.5*   HCT  --   --  22.7* 21.7* 18.8*   MCV  --   --  84 83 83   MCH  --   --  25.5* 24.8* 24.1*   MCHC  --   --  30.4* 30.0* 29.3*   RDW  --   --  16.7* 16.9* 17.1*   PLT  --   --  377 396 421   < > = values in this interval not displayed.  INR  Recent Labs  Lab 05/28/18  0254 05/27/18  2320   INR 1.20* 1.27*     LFTs  Recent Labs  Lab 05/28/18  0254 05/27/18  2320   ALKPHOS 166* 159*   AST 52* 54*   ALT 29 27   BILITOTAL 0.4 0.4   PROTTOTAL 6.2* 5.9*   ALBUMIN 1.6* 1.5*      PANCNo lab results found in last 7 days.    IMAGING:  CT abdomen/pelvis 5/28/18:   1. Examination for etiology of bleeding is limited by the lack of intravenous contrast.   2a. Markedly abnormal appearance of  the urinary bladder containing   soft tissue density as well as foci of air which is likely due to a   combination of bladder malignancy and blood clot. The urinary bladder   is significantly distended up to 26.2 cm.   2b. Confluent with the bladder mass posteriorly is a soft tissue mass   measuring 9.7 x 4.5 x 13.4 cm, which appears contiguous with the   sigmoid colon and suspicious for invasion of the sigmoid colon. There   is a diverting colostomy in the left upper quadrant at the level of   the distal transverse colon.   2c. Erosion of the L5 vertebral body which is suspicious for   metastasis or local invasion of the L5 vertebral body from the urinary   bladder mass.  3. Bilateral percutaneous nephrostomy tubes with persistent moderate hydronephrosis.  4. Cholelithiasis without evidence of cholecystitis

## 2018-05-28 NOTE — ED NOTES
Blood started with GENET Angela. Vitals pre-transfusion: 97.8 temp, 95% room air, 112 heart rate, 94/53 bp

## 2018-05-28 NOTE — CONSULTS
Urology Consult    Name: Edy Uribe    MRN: [id number]   YOB: 1960       We were asked to see Edy Uribe for evaluation and treatment of the following chief complaint.        Chief Complaint:   Bleeding from PNT           History of Present Illness:   Edy Uribe is a 57 year old male with ho of primary bladder carcinoma with sarcomatoid changes and extensive rhabdomyoblastic differentiation; cT4 s/p diverting transverse colostomy for obstruction. He is also s/p bilateral PNTs for obstructive uropathy. He was undergoing jamie-adjuvant chemo with carboplatin and gemcitabine (per heme-onc notes from [facility], received 1 cycle last in march, as there was no evidence of mets at that time, again per nore review in care everywhere) but was stopped due to C DIff colitis.   He has finally been discharged from the rehabilitation facility after a too-long stay; he will be finishing   Nephrostomy tube exchange completed March 6.Patient has been recovering since then but is hospitalized today due to concern for bleeding from nephrostomy tube, colostomy and Hg 5.5.    Patient denies any bladder or flank pain. He feels weak.            Past Medical History:   No past medical history on file.         Past Surgical History:   No past surgical history on file.         Social History:     Social History   Substance Use Topics     Smoking status: Not on file     Smokeless tobacco: Not on file     Alcohol use Not on file            Family History:   No family history on file.         Allergies:   No Known Allergies         Medications:     Current Facility-Administered Medications   Medication     0.9% sodium chloride BOLUS     0.9% sodium chloride BOLUS     acetaminophen (TYLENOL) tablet 650 mg    Or     acetaminophen (TYLENOL) Suppository 650 mg     albumin human 25 % injection 25 g     HYDROmorphone (DILAUDID) injection 0.2 mg     lactated ringers BOLUS 500 mL     lidocaine (LMX4) kit      lidocaine 1 % 1 mL     naloxone (NARCAN) injection 0.1-0.4 mg     norepinephrine (LEVOPHED) 16 mg in D5W 250 mL infusion     pantoprazole (PROTONIX) 80 mg in sodium chloride 0.9 % 100 mL infusion     piperacillin-tazobactam (ZOSYN) 4.5 g vial to attach to  mL bag     sodium chloride (PF) 0.9% PF flush 3 mL     sodium chloride (PF) 0.9% PF flush 3 mL     sodium chloride 0.9% infusion     vancomycin (VANCOCIN) 2,000 mg in sodium chloride 0.9 % 500 mL intermittent infusion             Review of Systems:    ROS: 10 point ROS neg other than the symptoms noted above in the HPI           Physical Exam:   VS:  T: 101.2    HR: 114    BP: 96/49    RR: 18   GEN:  AOx3.  NAD.    CV:  RRR  LUNGS: Non-labored breathing.   BACK:  No midline or CVA tenderness.  ABD:  Firm suprapubic mass, non tender. Rest of belly is soft. Stoma with no output  PNTsL right with clear yellow urine, left with pink colored output   :  Uncircumcised with some blood at the meatus   SKIN:  Warm.  Dry.  No rashes.  NEURO:  CN grossly intact.            Data:   All laboratory data reviewed:      Recent Labs  Lab 05/28/18  0845 05/28/18  0607 05/28/18  0354 05/27/18  2320   WBC  --  11.3* 12.0* 13.5*   HGB 7.1* 6.9* 6.5* 5.5*   PLT  --  377 396 421       Recent Labs  Lab 05/28/18  0254 05/27/18  2320    134   POTASSIUM 4.5 4.5   CHLORIDE 104 102   CO2 24 22   BUN 46* 47*   CR 1.55* 1.72*   * 146*   KATHI 8.5 8.5   MAG  --  2.5*       Recent Labs  Lab 05/28/18  0031   COLOR Dark Red   APPEARANCE Cloudy   URINEGLC Negative   URINEBILI Negative   URINEKETONE Negative   SG 1.020   URINEPH 6.0   PROTEIN 300*   NITRITE Positive*   LEUKEST Moderate*   RBCU 1923*   WBCU 2853*       All pertinent imaging reviewed:    Results for orders placed or performed during the hospital encounter of 05/27/18   XR Chest Port 1 View    Narrative    Exam: XR CHEST PORT 1 VW, 5/28/2018 12:16 AM    Indication: hypotension;     Comparison: None    Findings:    Portable vasculature is prominent. Heart size within normal limits.  Low lung volumes. Clear lungs. No pneumothorax or pleural effusion.  Upper abdomen is unremarkable.      Impression    Impression:  Low lung volumes.  Clear lungs.    I have personally reviewed the examination and initial interpretation  and I agree with the findings.    MARY PICKETT MD   CT Abdomen Pelvis w/o Contrast    Narrative    RESIDENT PRELIMINARY REPORT - The following report is a preliminary  interpretation.      1. Examination for malignancy of bleeding is limited by the lack of  intravenous contrast.  2a. Markedly abnormal appearance of the urinary bladder containing  soft tissue density as well as foci of air which is likely due to a  combination of bladder malignancy and blood clot. The urinary bladder  is significantly distended up to 26.2 cm.  2b. Confluent with the bladder mass posteriorly is a soft tissue mass  measuring 9.7 x 4.5 x 13.4 cm, which appears contiguous with the  sigmoid colon and suspicious for invasion of the sigmoid colon. There  is a diverting colostomy in the left upper quadrant at the level of  the distal transverse colon.  2c. Erosion of the L5 vertebral body which is suspicious for  metastasis or local invasion of the L5 vertebral body from the urinary  bladder mass.  3. Hyperdense stool in the right colon and remaining transverse colon,  which is nonspecific and may be due to inspissated stool, excretion of  prior contrast, blood products, or a combination of both.  4. Bilateral percutaneous nephrostomy tubes with persistent moderate  hydronephrosis.  5. Cholelithiasis without evidence of cholecystitis.   XR Chest Port 1 View    Narrative    Chest radiograph one view  5/28/2018 7:58 AM      HISTORY: New central line placement    COMPARISON: 5/28/2018    FINDINGS: Left PICC tip projects over the lower SVC. Cardiac  silhouette is unchanged. Unchanged perihilar opacities. No pleural  effusion or  pneumothorax.      Impression    IMPRESSION:   1. Left PICC tip projects over the lower SVC. No pneumothorax.  2. Unchanged perihilar opacities.                Impression and Plan:   Impression:   Edy Uribe is a 57 year old male with at least locally advanced bladder cancer with variant histology presenting with anemia, could be due to bleeding could be from tumor or decreased erythrocytosis       Plan:     - no evidence of gross bleeding from PNTs, wouldn't explain the significant drop in Hg    - would work up bleeding in GI tract as patient reported blood out of colostomy     - recommend palliative care consult as disease appears to be advanced and morbidity/mortality of surgical intervention is very high with questionable benefit              This patient's exam findings, labs, and imaging discussed with urology staff surgeon Dr. Kang, who developed the treatment plan.    Renée Che MD  Urology Resident PGY3

## 2018-05-28 NOTE — PROGRESS NOTES
Brief GI Note  05/28/18    Called by ER MD Dr. Vincent regarding Mr. Uribe who is a 57 year old male with h/o extensive bladder cancer s/p diverting colostomy and bilateral percutaneous nephrostomy tubes with recent C difficile infection presenting from SNF with bloody nephrostomy tube output, leukocytosis, hemoglobin 5.5, pyuria on UA and borderline hypotension (MAPs currently in mid 60s), HRs 110s. On evaluation noted to have some bloody ostomy output. Patient is being admitted to MICU for further evaluation. Patient has been started on PPI gtt.     Unclear what is driving clinical picture at this point (bleeding vs infection). After discussion with ICU resident minimal/small (~10 cc) amount of bloody output in ostomy bag, patient notes symptoms of nephrostomy and ostomy bleeding x ~5 days, otherwise asymptomatic Hemoglobin during April admission in 7s range (~1.5 gm higher than current hemoglobin).     Recommend infectious evaluation given recent admission at OSH for osteomyelitis, UTI, rectus sheath abscess.  Note pending CT scan of the abdomen/pelvis.  Agree with blood transfusion and supportive care per ICU team. Ensure two large bore peripheral IVs. Trend hemoglobin. Strict documentation of nephrostomy and ostomy output.     Differential diagnosis for Mr. Uribe's bloody ostomy output can include direct tumor invasion of surrounding structures +/- vascular involvement (would be concerned for this given nephrostomy bleeding), stomal irritation, C difficile infection; less likely stomal varices (no h/o portal hypertension), brisk upper GI bleed, AVM, etc.      Formal GI consult in AM. If patient decompensates overnight, does not correct hemoglobin with transfusion, has increasing bloody output from ostomy, please contact me.     Addendum @ 2:11a: Discussed CT imaging with reading radiologist, overall unable to evaluate for  bleeding given non-contrast study, however, most likely hyperenhancement noted on  images is stool burden, prior contrast and medications rather than active bleeding. More concerning is bladder mass, extensive mass effect. Recommend obtaining outside images with outside reads requested by our radiologists.    Chen Carrera MD  Gastroenterology Fellow

## 2018-05-29 NOTE — CONSULTS
"[facility], [place]  Palliative Care Consultation Note    Patient: Edy Uribe  Date of Admission:  5/27/2018    Requesting provider/team: Queta Carmona MD / MICU   Reason for consult:  Patient and family support     Patient / Family request     Recommendations:  -Will involve Palliative  for additional spiritual support       Thank you for the opportunity to participate in the care of this patient and family. Our team will follow. Please feel free to contact the on-call Palliative provider with any urgent needs.    WILFRED Brown CNP  Palliative Care Consult Team  Pager: [phone]    [facility] Inpatient Team Consult pager [phone] (M-F 8-4:30)  After-hours Answering Service [phone]   Palliative Clinic: [phone]     70 minutes spent with patient, with >50% counseling and in care coordination.    Assessment:  Edy Uribe is a 57 year old male with stage IV bladder cancer complicated by obstructive uropathy s/p bilateral nephrostomy tubes, and rectal obstruction s/p diverting ostomy. He was admitted on 5/27 with hematuria from his left nephrostomy tube and recent maroon stools from his colostomy.     I met with Fahad today to introduce the Palliative Care team and services we provide; such as symptom management, assistance navigating chronic illness and goals for treatment, counseling, psychosocial and spiritual support. He shared with me that he is hopeful but realistic; he believes God will either provide a miracle with his cancer or he will \"call him home\" and he is okay with either. While he is alive he wants to pursue medical treatment that could lengthen his life, and trusts that God will do the rest. He is okay with using medications in adjunct with prayer.     Symptoms:   Back pain--chronic. Uses prayer and Oxycodone, which is working quite well currently, but does endorse continuous moderate pain, which he is hopeful PT will be able to " teach him exercises to work on.      Social:         Living situation: nursing home        Support system: 8 sisters, 2 brothers, significant other        Functional status: dependent for ADLs       Substance use disorder (past / present): none       Occupation: musician     Coping: sees his entire life through the lens of his abril; uses prayer and music for comfort    Spiritual/Jewish:    Spiritual background: Confucianism  Spiritual needs: wants as much spiritual support as possible   Sense/Meaning Making: is hoping for a miracle, but endorses believing God is in control and he is at peace with whatever his future holds     Advance Care Planning:               Decision making capacity: intact        Disease understanding: Patient understands that his medical team believes his cancer will end his life.        Goals of Care: live longer, if possible       Health care directive: not on file       Health care agent: n/a       Code Status:  DNR / DNI       no POLST (Physician orders for life-sustaining treatment) form on file    History of Present Illness   Sources of History: electronic health record and patient's family     Edy Uribe is a 57 year old male with a past medical history metastatic bladder carcinoma with sarcomatoid changes and extensive rhabdomyoblastic differentiation, complicated by rectal obstruction from the bladder mass requiring bilateral PNTs and diverting colostomy.     Edy presented from the nursing home on 5/27 with bloody output from left PNT and colostomy. He was in shock and required ICU care for vasopressors. The blood has subsided and he is now hemodynamically stable off vasopressors.     ROS:  A comprehensive ROS has been negative other than stated in the HPI and below:   Palliative Symptom Review (0=no symptom/no concern, 1=mild, 2=moderate, 3=severe):  Pain: mild-moderate chronic lower back pain  Fatigue: mild   Nausea: denies  Depressive Symptoms: denies  Anxiety:  denies  Drowsiness: denies  Poor Appetite: denies  Shortness of Breath: denies   Insomnia: denies   Delirium: denies        Past Medical History:   No past medical history on file.       Past Surgical History:   No past surgical history on file.          Family History:   No family history on file.         Allergies:   No Known Allergies         Medications:   I have reviewed this patient's medication profile and medications given in the past 24 hours.           Physical Exam:   Vital Signs: Temp: 97.3  F (36.3  C) Temp src: Axillary BP: 95/63   Heart Rate: 98 Resp: 12 SpO2: 99 % O2 Device: BiPAP/CPAP Oxygen Delivery: 2 LPM  Weight: 230 lbs 9.62 oz    Constitutional: Pleasant male, seen lying in hospital bed. Ill appearing, but in no acute distress.  Head: Normocephalic. Atraumatic. MMM.   Extremities: Warm and well perfused.   Pulm: Non-labored breathing. Speaking in complete sentences.    Musculoskeletal: WOLF.  Skin: pale. No jaundice. No concerning rashes or lesions on exposed areas.   Neuro: A/O x 4. Face symmetrical. PERRL. EOMI.   Psych: Speech clear. Memory and insight intact.        Data reviewed:     CMP  Recent Labs  Lab 05/29/18  0420 05/28/18  0254 05/27/18  2320    138 134   POTASSIUM 3.4 4.5 4.5   CHLORIDE 111* 104 102   CO2 21 24 22   ANIONGAP 11 10 10   * 113* 146*   BUN 37* 46* 47*   CR 1.22 1.55* 1.72*   GFRESTIMATED 61 46* 41*   GFRESTBLACK 73 56* 49*   KATHI 8.4* 8.5 8.5   MAG  --   --  2.5*   PROTTOTAL 5.7* 6.2* 5.9*   ALBUMIN 1.4* 1.6* 1.5*   BILITOTAL 0.4 0.4 0.4   ALKPHOS 133 166* 159*   AST 30 52* 54*   ALT 18 29 27     CBC  Recent Labs  Lab 05/29/18  0800 05/29/18  0420 05/28/18  2356 05/28/18  2057  05/28/18  0607 05/28/18  0354 05/27/18  2320   WBC  --  12.7*  --   --   --  11.3* 12.0* 13.5*   RBC  --  2.91*  --   --   --  2.71* 2.62* 2.28*   HGB 7.4* 7.5* 7.6* 7.6*  < > 6.9* 6.5* 5.5*   HCT  --  24.4*  --   --   --  22.7* 21.7* 18.8*   MCV  --  84  --   --   --  84 83 83   MCH   --  25.8*  --   --   --  25.5* 24.8* 24.1*   MCHC  --  30.7*  --   --   --  30.4* 30.0* 29.3*   RDW  --  16.8*  --   --   --  16.7* 16.9* 17.1*   PLT  --  360  --   --   --  377 396 421   < > = values in this interval not displayed.  INR  Recent Labs  Lab 05/29/18  0420 05/28/18  0254 05/27/18  2320   INR 1.18* 1.20* 1.27*     Arterial Blood Gas  Recent Labs  Lab 05/28/18  0926 05/28/18  0258   PH 7.39 7.40   PCO2 35 35   PO2 90 76*   HCO3 21 21   O2PER 2L 2L

## 2018-05-29 NOTE — PROGRESS NOTES
[facility]      MICU Progress Note  Edy Uribe MRN: [id number]  1960  Date of Admission:5/27/2018  Primary care provider: No Ref-Primary, Physician      Assessment and Plan:     Edy Uribe is a 57 year old male with a history of stage IV bladder cancer complicated by obstructive uropathy s/p bilateral nephrostomy tubes (currently receiving neoadjuvant chemo in hopes of surgical treatment), rectal obstruction s/p diverting ostomy who presented from nursing home with blood coming from his left nephrostomy tube and hypotension, admitted to MICU with shock (hemorrhagic vs septic).  Now off of pressors and hemodynamically stable without evidence of ongoing bleeding.     Changes Today:  - 500cc NS bolus  - Check Hgb q12 hour  - Transfer to general medicine   - Continue PO vancomycin for recurrent Cdiff infection    PLAN:  ===NEURO===  # AMS  Patient protecting his airway currently. ABG without hypercarbia. Likely secondary to shock, resuscitated as discussed below. He does not have focal neuro deficits. Improving on 5/29.   - Monitor     ===CARDIOVASCULAR===  # Shock secondary to hemorrhage vs sepsis- resolving   - Resuscitated with 2L IVF and 12.5g albumin, along with 3u pRBC on 5/28  Hgb stable overnight (one unit stopped early given febrile reaction)  - Levophed to maintain MAP>65 (off pressors since 5/28)  - GI and urology aware, appreciate recs    ===PULMONARY===  # SAMUEL  Desats to SpO2 60% for very brief periods of time during witnessed apneic events while sleeping per nursing. Placed on CPAP overnight. Patient not on home CPAP per family  - recommend CPAP overnight for witnessed sleep apnea     # Tachypnea  Likely 2/2 sepsis as discussed below. Will monitor closely.    ===GASTROINTESTINAL===  # Bloody ostomy output   Maroon colored output from ostomy. May be secondary to recurrent Cdiff infection. Ddx is broad and includes direct tumor invasion of surrounding structures, vascular  involvement, stomal irritation, stomal varices.   - GI consulted, appreciate recs  - Transfuse to Hgb >7  - Q12H Hgb  - Protonix gtt per GI     # Recent history of clostridium difficile colitis  S/p prolonged course of PO vancomycin at OSH for recurrent Cdiff infection since March 2018. Currently, he is ~2 weeks s/p completion fo vancomycin taper which is when relapse chances are greatest. He was again found to be Cdiff positive on 5/28.   - Enteric precautions  - C diff positive: Resumed PO vancomycin 125 mg QID (5/28- ) with plan for 4 week course, followed by decreased dose of 125mg daily indefinitely (as long as he is undergoing cancer treatments and continues to have urinary infections) per GI     ===RENAL===  # Chronic kidney disease related to obstructive uropathy  Baseline creatinine is 0.6-0.9. Suspect worsening in the setting of anemia and shock.Creatinine 1.22 today, down-trending.   - Strict I/Os  - Blood, fluid resuscitation as discussed above    ===HEME/ONC===  # Acute on chronic anemia with concern for hemorrhagic shock  Baseline hemoglobins 6-8 at recent admission in [place], 7.5 at discharge. He reported bloody output from his nephrostomy as well as from his ostomy in the past several days. Currently, having pink tinged output from left nephrostomy tube and maroon output from ostomy.   - Transfuse to Hgb >7 . Will touch base with blood bank regarding possible transfusion related fever   - Q12H Hgb checks   - GI consulted for concern for LGIB, appreciate recs  - Urology consulted for concern for hematuria from nephrostomy, appreciate recs     # Bladder carcinoma with sarcomatoid changes and extensive rhabdomyoblastic differentiation  Receives care at [facility] and [place]. Unfortunately, we do not currently have these records. Per report, recently received neoadjuvant chemo in hopes of surgery.  Recently received neoadjuvant chemotherapy (one cycle of carboplatin and gemcitabine) with hopes  of returning to [facility] for possible immunotherapy.   - Consider heme/onc consultation, will work on obtaining outside records   - Previously followed with palliative care at OSH for symptom management and spiritual support, consulted here, appreciate recs    # Febrile non-hemolytic transfusion reaction  Temperature elevation by 5.5F following first blood transfusion, and 1.2F during third transfusion. Discussed with blood bank MD.  - Monitor closely during transfusions if additional are needed. Premedicate with tylenol     ===ENDOCRINE===  No acute issues. FSG q4 hr. Monitor for hypoglycemia.     ===INFECTIOUS DISEASE===  # Shock, possible sepsis - improved   Fevers as high as 103 recorded during blood transfusions. Likely  secondary to transfusion reaction. Ddx included sepsis, although patient appears clinically improved s/p resolution of hemorrhage and cultures show NGTD. Patient does have significant risk of infection in the setting of large, invasive bladder cancer, s/p ostomy and bilateral nephrostomy tubes. CXR without focal consolidation.   - Lactic acid 2.4 , procalcitonin 11.5   - Blood cultures, urine culture pending. NGTD  - Discontinue antibiotics today, consider restarting zosyn if clinically decompensates     ===SKIN/MSK===  # L5/S1 osteomyelitis vs tumor invasion  - Monitor closely     Prophylaxis:  DVT: SCDs given bleeding   GI: Pantoprazole gtt  Family:  Updated sister Yesy over the phone  Disposition: Transfer to medicine     Code Status: DNR/DNI, discussed with patient and family (incuding sister Yesy)    Patient was seen and discussed with attending physician Dr. Perlman, who agrees with above assessment and plan.    Queta Carmona MD  Internal Medicine, PGY1  [id number]        Interval History:   Overnight, patient was hemodynamically stable and weaned off of pressors. He has not required additional blood products. He is comfortable this morning and denies pain.          Past  "Medical History:   Medical History reviewed.   1. 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 Rectal obstruction related to bladder mass and intrinsic compression, status post diverting colostomy  4. Chronic anemia  5. L5/S1 osteomyelitis vs tumor invasion  6. Recent history of clostridium difficile colitis  7. SAMUEL         Past Surgical History:   Surgical History reviewed.   1. Bilateral nephrostomy tubes   2. TURP for prostate in 2017   3. Diverting colostomy for rectal obstruction 2/2 bladder mass 2/1/18         Allergies:   No Known Allergies   Possible transfusion reaction to pRBC 5/28 (fevers)         Medications:   Medications Reviewed.   Current Outpatient Prescriptions   Medication Sig     Acetaminophen (TYLENOL PO) Take 500 mg by mouth every 8 hours as needed for mild pain or fever     ascorbic acid (VITAMIN C) 500 MG CPCR CR capsule Take 500 mg by mouth daily     ferrous sulfate (IRON) 325 (65 Fe) MG tablet Take by mouth daily (with breakfast)     lidocaine (LIDODERM) 5 % Patch Place 1 patch onto the skin every 24 hours     MAGNESIUM OXIDE PO Take 400 mg by mouth daily     METOPROLOL TARTRATE PO Take 12.5 mg by mouth 2 times daily     OXYCODONE HCL PO Take 5 mg by mouth every 3 hours as needed     Prochlorperazine Maleate (COMPAZINE PO) Take 10 mg by mouth every 6 hours as needed for nausea           Physical Exam:   Vitals were reviewed.  Blood pressure 106/66, pulse 114, temperature 97.4  F (36.3  C), temperature source Axillary, resp. rate 14, height 1.885 m (6' 2.2\"), weight 104.6 kg (230 lb 9.6 oz), SpO2 100 %.    General: Oriented to person and time only, appears lethargic, pale   HEENT: MMM,  EOM intact  CV: tachycardic, normal S1S2, no murmur/rubs  Resp: Clear to auscultation bilaterally, no wheezes  Abd: Large, firm suprapubic mass, nontender to palpation. Colostomy with maroon output. " Bilateral nephrostomy tubes- right draining yellow urine and left with pink tinged urine. Quiet bowel sounds.   Extremities: Warm and well perfused, palpable pulses, no LE edema  Neuro: alert and oriented, moving all extremities   Skin: Not jaundiced, no rash, no ecchymoses appreciated, pale          Data:   I/O last 3 completed shifts:  In: 5941.48 [P.O.:362.5; I.V.:4778.98; IV Piggyback:500]  Out: 2465 [Urine:2150; Stool:315]    ROUTINE LABS (Last four results)  CMP    Recent Labs  Lab 05/29/18  0420 05/28/18  0254 05/27/18  2320    138 134   POTASSIUM 3.4 4.5 4.5   CHLORIDE 111* 104 102   CO2 21 24 22   ANIONGAP 11 10 10   * 113* 146*   BUN 37* 46* 47*   CR 1.22 1.55* 1.72*   GFRESTIMATED 61 46* 41*   GFRESTBLACK 73 56* 49*   KATHI 8.4* 8.5 8.5   MAG  --   --  2.5*   PROTTOTAL 5.7* 6.2* 5.9*   ALBUMIN 1.4* 1.6* 1.5*   BILITOTAL 0.4 0.4 0.4   ALKPHOS 133 166* 159*   AST 30 52* 54*   ALT 18 29 27     CBC    Recent Labs  Lab 05/29/18  0420 05/28/18  2356 05/28/18  2057 05/28/18  1628  05/28/18  0607 05/28/18  0354 05/27/18  2320   WBC 12.7*  --   --   --   --  11.3* 12.0* 13.5*   RBC 2.91*  --   --   --   --  2.71* 2.62* 2.28*   HGB 7.5* 7.6* 7.6* 7.2*  < > 6.9* 6.5* 5.5*   HCT 24.4*  --   --   --   --  22.7* 21.7* 18.8*   MCV 84  --   --   --   --  84 83 83   MCH 25.8*  --   --   --   --  25.5* 24.8* 24.1*   MCHC 30.7*  --   --   --   --  30.4* 30.0* 29.3*   RDW 16.8*  --   --   --   --  16.7* 16.9* 17.1*     --   --   --   --  377 396 421   < > = values in this interval not displayed.  INR    Recent Labs  Lab 05/29/18  0420 05/28/18  0254 05/27/18  2320   INR 1.18* 1.20* 1.27*     Arterial Blood Gas    Recent Labs  Lab 05/28/18  0926 05/28/18  0258   PH 7.39 7.40   PCO2 35 35   PO2 90 76*   HCO3 21 21   O2PER 2L 2L     Imaging:  CXR 5/28: low lung volumes, clear lungs    CT A/P 5/28/17 per prelim report:       1. Examination for malignancy of bleeding is limited by the lack of  intravenous  contrast.  2a. Markedly abnormal appearance of the urinary bladder containing  soft tissue density as well as foci of air which is likely due to a  combination of bladder malignancy and blood clot. The urinary bladder  is significantly distended up to 26.2 cm.  2b. Confluent with the bladder mass posteriorly is a soft tissue mass  measuring 9.7 x 4.5 x 13.4 cm, which appears contiguous with the  sigmoid colon and suspicious for invasion of the sigmoid colon. There  is a diverting colostomy in the left upper quadrant at the level of  the distal transverse colon.  2c. Erosion of the L5 vertebral body which is suspicious for  metastasis or local invasion of the L5 vertebral body from the urinary  bladder mass.  3. Hyperdense stool in the right colon and remaining transverse colon,  which is nonspecific and may be due to inspissated stool, excretion of  prior contrast, blood products, or a combination of both.  4. Bilateral percutaneous nephrostomy tubes with persistent moderate  hydronephrosis.  5. Cholelithiasis without evidence of cholecystitis.    Attending Note:    The patient was seen and examined by me and discussed at length with the resident. I have personally reviewed all labs, vital signs, imaging and culture results from the last 24 hours  I have read and agree with the resident note from today which reflects our joint findings, assessment and plan.    David Perlman, M.D.    Pulmonary, Allergy and Critical Care Medicine  [facility]

## 2018-05-29 NOTE — CONSULTS
Laboratory Medicine and Pathology  Transfusion Medicine- Transfusion Reaction    Edy Uribe MRN# [id number]   YOB: 1960 Age: 57 year old     Date of Reaction: May 28, 2018             Transfusion Reaction Evaluation   Impression    Definitive febrile non-hemolytic transfusion reaction (FNHTR), non-severe, of probable imputability.    The patient experienced a 5.5F rise in temperature immediately following the transfusion of the first of 4 RBC units, meeting criteria for a definitive FNHTR. Of note, the patient's temperature jessica 1.2F during transfusion of unit #3; the transfusion was then stopped.  Transfusions of units #2 and #4 were completed without incident.    Final culture results are pending.    Recommendation    Transfuse as needed.       ----------------------------------    History    57 year old man with stage IV bladder cancer and acute on chronic anemia who presented with blood in his nephrostomy tube and colostomy bag and was found to be in shock secondary to hemorrhage and sepsis.     Indication for transfusion: Acute on chronic anemia    Unit #1 was completely transfused from 0:41 to 3:13 AM on 5/28/18.    Unit #2 was completely transfused from 04:10-05:15 on 5/28/18.    Unit #3 was incompletely transfused from 6:45 AM to an unspecified time on 5/28/18.    Unit #4 was completely transfused from 14:00-16:30 on 5/28/18.      Post-Transfusion Events    The patient received a total of 4 units/partial units of RBC between 0:41 and 16:30 on 5/28/18.    The patient's temperature jessica 5.5F during transfusion of unit #1 (temperature shortly after transfusion was begun: 97.8F; temperature immediately following transfusion of this unit: 103.3F).    Transfusion of unit #2 was completed without incident.    The patient's temperature jessica 1.2F during transfusion of unit #3 (temperature shortly after transfusion was begun: 98.9F; temperature when the transfusion was stopped:  100.4F).    Transfusion of the unit #4 was completed without incident.      Reported Symptoms (from [facility])    Fever      Vital Signs (From EPIC Blood Administration Flowsheet)                Blood Bank Investigation  (Units are listed in the ordered they were transfused)    Applies to all units:    Transfusion History: None, per Epic    Transfusion Reaction History: None, per Epic    Antibody Screen: Negative    Unit #1  Product Type: Red blood cells  Unit Number: [id number]  Amount Remaining: ~1 ml  Post-Transfusion Clerical Check: Correct  ABO/Rh: The unit type was A POS and the patient was A POS. Per protocol, this unit is appropriately ABO compatible.  PATRICK: Negative  Post-Transfusion Plasma: Straw-colored    The unit was cultured and Gram stain was performed after adding 10 ml of saline prior to inoculating the blood culture bottles. Gram stain showed no organisms and culture is no growth to date, pending final.    Unit #2  Product Type: Red blood cells  Unit Number: [id number]  Transfused without incident    Unit #3  Product Type: Red blood cells  Unit Number: [id number]  Amount Remaining: ~220 ml  Post-Transfusion Clerical Check: Correct  ABO/Rh: The unit type was A POS and the patient was A POS. Per protocol, this unit is appropriately ABO compatible.  PATRICK: Negative  Post-Transfusion Plasma: Straw-colored    The unit was cultured and Gram stain was performed. Gram stain showed no organisms and culture is no growth to date, pending final.    Unit #4  Product Type: Red blood cells  Unit Number: [id number]  Transfused without incident    [facility]  Case Definition: Definitive febrile non-hemolytic transfusion reaction  Severity: Non-severe  Imputability: Probable    Adriana Spence MD  Transfusion Medicine Service PGY3  Pager: [phone]       Addendum:  The final microbial cultures show no growth.  The interpretation remains unchanged.  This was a   definitive febrile non-hemolytic transfusion  reaction, non-severe, of probable imputability.    Richardson Guzman M.D.  Professor, Transfusion Medicine  Laboratory Medicine & Pathology  Pager: [phone]

## 2018-05-29 NOTE — PLAN OF CARE
Problem: Patient Care Overview  Goal: Plan of Care/Patient Progress Review  Neuro: A&Ox4 with occ. confusion.   Cardiac: ST. Other VSS.   Respiratory: SpO2 mid 90s on 2L NC while awake, when sleeping patient quickly desaturated to the 60s and was placed on CPAP.  GI/: Adequate urine output through bilateral nephrostomies, L draining pink tinged urine and R is draining sedimented yellow urine. BM through intact colostomy, maroon in color  Diet/appetite: NPO except meds and ice chips.  Activity:  Assist of 1-2, patient frequently refused repositioning.  Pain: At acceptable level on current regimen.  Educated about the importance to reposition to reduce risk of/avoid developing pressure injuries.  Skin: No new deficits noted.  NS running at 125 mL/hr, protonix running at 10 mL/hr (8mg/hr)    Plan: Continue with POC. Notify primary team with changes.

## 2018-05-29 NOTE — PROGRESS NOTES
GI Progress Note  5/29/2018     S: Small amount of blood tinged ostomy output throughout the day. No abdominal pain. 4 point ROS otherwise negative.    O:  B/P: 85/54, T: 97.3, P: 114, R: 12   Gen: NAD  HEENT: MMM  Neck: supple  CV: tachycardic  Pulm: non labored  Abd: soft, non tender, well healed surgical scar, ostomy appliance removed and colostomy inspected- scant red tinged fluid on external mucosa and anastomosis, non tender digital inspection with brownish stool and no red blood.   Skin: WWP  Neuro: AAOx3    Labs and imaging personally reviewed.   Lab Results   Component Value Date    WBC 12.7 05/29/2018     Lab Results   Component Value Date    RBC 2.91 05/29/2018     Lab Results   Component Value Date    HGB 7.4 05/29/2018     Lab Results   Component Value Date    HCT 24.4 05/29/2018     No components found for: MCT  Lab Results   Component Value Date    MCV 84 05/29/2018     Lab Results   Component Value Date    MCH 25.8 05/29/2018     Lab Results   Component Value Date    MCHC 30.7 05/29/2018     Lab Results   Component Value Date    RDW 16.8 05/29/2018     Lab Results   Component Value Date     05/29/2018       Assessment and recommendations:   57 year old male with extensive primary bladder carcinoma c/b extrinsic compression on rectum causing obstruction s/p emergent diverting colostomy, in addition to obstructive uropathy (s/p B/L PNTs) - currently on neoadjuvant chemotherapy (one cycle of carboplatin and gemcitabine- held due to overall functional decline as well as C Diff infection), hx of C Diff colitis requiring a prolonged course of PO vancomycin, L5/S1 OM, SAMUEL who was initially admitted to the hospital for c/o bloody output from the nephrostomy tube.     C diff returned positive.     Output from colostomy is reddish and may very well be likely from mucosal and/or peristomal irritation (supported by brown stool on digital examination). If he were to have evidence of more brisk bleeding, we  would re-evaluate his stoma for edgar-stomal vessels vs intraluminal bleeding.      - PPI QD  - vancomycin 125mg QID x2 weeks, then daily thereafter until no longer receiving antibiotics or chemotherapy  - Will consider a endoscopic evaluation (EGD +/- colonoscopy) if patient has convincing evidence of ongoing GI bleeding leading to hemodynamic compromise    Discussed with attending, Dr Garcia.     Adams Cox MD  GI Fellow  [phone]

## 2018-05-29 NOTE — PLAN OF CARE
Problem: Patient Care Overview  Goal: Plan of Care/Patient Progress Review  OT/4C - Discharge Planner OT   Patient plan for discharge: rehab near home  Current status: Pt declines sitting EOB or transfer to chair due to pain from bladder mass. Mod A required to roll in bed. Facilitated UE calisthenic exercises while supine in bed working to increase activity tolerance required for ADL. Pt tolerates 3 exercises, x1 min each with ~20 seconds rest between exercises, x2 sets (6 exercises total). Pt on 2L NC during session, spO2 >98% throughout, HR 90s, BP 92/64 (71).   Barriers to return to prior living situation: pain, strength, activity tolerance  Recommendations for discharge: TCU  Rationale for recommendations: Per pt report, pt most recently coming from TCU/NH with the intent to rehab and go home from there. Pt would benefit from continued therapy to increase strength, activity tolerance, and IND with ADL prior to return home. Pt is motivated to increase strength.       Entered by: Cammie Medina 05/29/2018 11:26 AM

## 2018-05-29 NOTE — PROGRESS NOTES
05/29/18 1105   Quick Adds   Type of Visit Initial Occupational Therapy Evaluation   Living Environment   Lives With significant other;other (see comments)   Living Arrangements house   Home Accessibility stairs to enter home   Number of Stairs to Enter Home 25   Number of Stairs Within Home 0   Transportation Available family or friend will provide;car   Living Environment Comment Pt was living in a house with his girlfriend and her father at baseline. Has been in/out of hospitals/rehab facilities for the last few months.   Self-Care   Dominant Hand right   Usual Activity Tolerance good   Current Activity Tolerance poor   Equipment Currently Used at Home none   Activity/Exercise/Self-Care Comment Pt shrugs shoulder when asked abotu prior activity level. Pt has been using a FWW most recently while in facilities for functional mobility and could walk ~300 feet at a time.   Functional Level Prior   Ambulation 0-->independent   Transferring 0-->independent   Toileting 0-->independent   Bathing 0-->independent   Dressing 0-->independent   Eating 0-->independent   Communication 0-->understands/communicates without difficulty   Swallowing 0-->swallows foods/liquids without difficulty   Cognition 0 - no cognition issues reported   Which of the above functional risks had a recent onset or change? ambulation;transferring;toileting;bathing;dressing;cognition   Prior Functional Level Comment Pt was IND with ADL prior to recent hospitalizations.   General Information   Onset of Illness/Injury or Date of Surgery - Date 05/27/18   Referring Physician Ludy Bower MD   Patient/Family Goals Statement Increase strength   Additional Occupational Profile Info/Pertinent History of Current Problem Edy Uribe is a 57 year old male with a history of stage IV bladder cancer complicated by obstructive uropathy s/p bilateral nephrostomy tubes (currently receiving neoadjuvant chemo in hopes of surgical treatment), rectal  obstruction s/p diverting ostomy who presented from nursing home with blood coming from his left nephrostomy tube and hypotension, admitted to MICU with shock (hemorrhagic vs septic) requiring pressor support this morning.    Precautions/Limitations fall precautions;oxygen therapy device and L/min   General Observations Pt supine in bed upon arrival, agreeable to therapy.   Cognitive Status Examination   Orientation orientation to person, place and time   Level of Consciousness alert   Able to Follow Commands mild impairment   Cognitive Comment No cog issues reported. Slow processing speed noted.   Visual Perception   Visual Perception No deficits were identified   Sensory Examination   Sensory Comments BLE neuropathy at baseline.    Pain Assessment   Patient Currently in Pain No   Range of Motion (ROM)   ROM Quick Adds No deficits were identified   Strength   Strength Comments Generalized weakness   Instrumental Activities of Daily Living (IADL)   IADL Comments Pt receiving assist with all IADL at facility.   Activities of Daily Living Analysis   Impairments Contributing to Impaired Activities of Daily Living balance impaired;cognition impaired;fear and anxiety;strength decreased;sensation decreased   General Therapy Interventions   Planned Therapy Interventions ADL retraining;IADL retraining;cognition;bed mobility training;strengthening;transfer training;progressive activity/exercise;home program guidelines   Clinical Impression   Criteria for Skilled Therapeutic Interventions Met yes, treatment indicated   OT Diagnosis OT order for deconditioning   Influenced by the following impairments pain, activity tolerance, strength   Assessment of Occupational Performance 3-5 Performance Deficits   Identified Performance Deficits bathing, dressing, social skills   Clinical Decision Making (Complexity) Low complexity   Therapy Frequency 5 times/wk   Predicted Duration of Therapy Intervention (days/wks) 2 weeks   Anticipated  "Equipment Needs at Discharge (TBD with further therapy)   Anticipated Discharge Disposition Transitional Care Facility;Long Term Care Facility   Risks and Benefits of Treatment have been explained. Yes   Patient, Family & other staff in agreement with plan of care Yes   [facility] TM \"6 Clicks\"   2016, Trustees of [facility], under license to [employer].  All rights reserved.   6 Clicks Short Forms Daily Activity Inpatient Short Form   [facility]  \"6 Clicks\" Daily Activity Inpatient Short Form   1. Putting on and taking off regular lower body clothing? 2 - A Lot   2. Bathing (including washing, rinsing, drying)? 1 - Total   3. Toileting, which includes using toilet, bedpan or urinal? 1 - Total   4. Putting on and taking off regular upper body clothing? 2 - A Lot   5. Taking care of personal grooming such as brushing teeth? 3 - A Little   6. Eating meals? 2 - A Lot   Daily Activity Raw Score (Score out of 24.Lower scores equate to lower levels of function) 11   Total Evaluation Time   Total Evaluation Time (Minutes) 8     "

## 2018-05-29 NOTE — PLAN OF CARE
Problem: Renal Insufficiency Comorbidity  Goal: Renal Insufficiency  Patient comorbidity will be monitored for signs and symptoms of Renal Insufficiency (Chronic) condition.  Problems will be absent, minimized or managed by discharge/transition of care.   Patient has b/l nephrostomy tubes. Draining about 100mL/hr.     Problem: Gastrointestinal Condition Comorbidity  Goal: Gastrointestinal Condition  Patient comorbidity will be monitored for signs and symptoms of Gastrointestinal condition.  Problems will be absent, minimized or managed by discharge/transition of care.   Patient continues to have maroon output out of colostomy. Hgb checks remain stable. Continue to monitor and follow plan of care.     Problem: Patient Care Overview  Goal: Plan of Care/Patient Progress Review  Patient alert and oriented x4. Patient on room air for most of the day. Patient desatted to the 70s x3 when napping and CPAP was put on. HRs in the 90s and MAPs >65. Patient given 500mL NS bolus for low BPs. Patient afebrile. Patient continues to have maroon stool out of colostomy. Nephrostomy tubes draining about 100mL/hr. Patient continues to complain of pain; given PRN oxycodone and tylenol.   Plan: Patient transferred from MICU service to [place] [id number]. Patient waiting for bed on 6B. Continue to monitor and follow plan of care.

## 2018-05-29 NOTE — PLAN OF CARE
Problem: Infection, Risk/Actual (Adult)  Goal: Identify Related Risk Factors and Signs and Symptoms  Related risk factors and signs and symptoms are identified upon initiation of Human Response Clinical Practice Guideline (CPG).   Outcome: Improving  D: Patient appears to have sleep apnea.  He visibly stops breathing while sleeping, and his SPO2 dropped as low as 30% at times.  Hgb 6.6 at 1200. Patient refuses to move due to back pain.  I: MD notified, order received for Bipap/Cpap at night. 1 unit PRBC's given in afternoon, premedicated with tylenol. IV dilaudid and PO oxycodone given for pain.  A: patient has not yet tried the Bipap/Cpap.  Patient tolerated unit of PRBC without reaction, recheck Hgb 7.2.  Patient reported decrease in pain, but still refuses repositioning at times.  P: continue Q4hr Hgb.  Continue to re educate on need to reposition.

## 2018-05-30 NOTE — PLAN OF CARE
Problem: Infection, Risk/Actual (Adult)  Goal: Identify Related Risk Factors and Signs and Symptoms  Related risk factors and signs and symptoms are identified upon initiation of Human Response Clinical Practice Guideline (CPG).   Outcome: No Change  Afebrile. RR 12-20. -110s. BP stable.     Problem: Renal Insufficiency Comorbidity  Goal: Renal Insufficiency  Patient comorbidity will be monitored for signs and symptoms of Renal Insufficiency (Chronic) condition.  Problems will be absent, minimized or managed by discharge/transition of care.   Outcome: No Change  Good urine output from the R nephrostomy tube, 100-125 mL, clear yellow. From L nephrostomy tube, pink in beginning to saul yellow output in evening 10-75.     Problem: Patient Care Overview  Goal: Plan of Care/Patient Progress Review  Outcome: No Change  Received patient at 1200.   D: VSS. RA SpO2 > 95%. 1x episode of apneic and desat to 61% when sleeping in morning. Pt refused BiPAP and was awake rest of day with no desaturations.   A/I: Protonix gtt discontinued and started on oral Protonix. Minimal approx  mL red/maroom stools through in colonostomy bag today, MD Mistryon aware. Hgb recheck from 7.9 to 8.3. Pulled central line and x2 PIVs today. Pt PVC and PAC ectopy occasional and K+ 3.4 replaced with 40 mEq tabs. Recheck at 3.4, Md ordered 60 mEq K, but not yet received from pharmacy.NS 125ml/hr discontinued and x1 1000mL LR bolus given for hypovolemia/hypernatremia per MD. Orders for up with assist ordered but pt refused to get up to chair and q2 hr repositions. Able to see back 1x in evening with male nurse helping and mucosy BM changed in brief. Yesy sister called and updated. Lidocaine patch applied for low back pain, encouraged pt to let lidocaine help pain but pt refused and insistently requested oxycodone. 1x oxycodone given.   P: Continue to assess and monitor and plan of care. Transfer orders for Adult med/surg ordered.

## 2018-05-30 NOTE — PROGRESS NOTES
Social Work: Assessment with Discharge Plan    Patient Name:  Edy Uribe  :  1960  Age:  57 year old  MRN:  [id number]  Risk/Complexity Score:  Filed Complexity Screen Score: 6  Completed assessment with:  Patient, patient's sister Yesy, Care Coordinator Liz Escobar, Resident MD Ana Villalobos    Presenting Information   Reason for Referral:  Discharge plan  Date of Intake:  May 30, 2018?/ TBD  Referral Source:  Patient  Decision Maker:  Patient  Alternate Decision Maker:  n/a  Health Care Directive: Not on file  Living Situation:  Apartment  Previous Functional Status:  Assistance with Other:  per TCU staff  Patient and family understanding of hospitalization:  Patient understands and hopes to discharge for rehab and possible chemotherapy  Cultural/Language/Spiritual Considerations:  Sabianist  Adjustment to Illness:  Patient is forward thinking; patient remains hopeful for future treatment and made discharge goals    Physical Health  Reason for Admission:    1. GI bleed    2. Hypotension due to blood loss    3. EVERTON (acute kidney injury) (H)    4. Malignant neoplasm of urinary bladder, unspecified site (H)    5. Hematuria, gross    6. Nephrostomy tube bleed (H)    7. Dehydration      Services Needed/Recommended:  TCU    Mental Health/Chemical Dependency  Diagnosis:  n/a  Support/Services in Place:  n/a  Services Needed/Recommended:  n/a    Support System  Significant relationship at present time:  Fiance/girlfriend, sisters and brothers in [facility]  Family of origin is available for support:  Yes  Other support available:  unknown  Gaps in support system:  none  Patient is caregiver to:  None     Provider Information   Primary Care Physician:  No Ref-Primary, Physician   None   Clinic:        :  n/a    Financial   Income Source:  unknown  Financial Concerns:  None stated  Insurance:    Payor/Plan Subscriber Name Rel Member # Group #   BLUE PLUS - BLUE PLUS* EDY URIBE  LE*  [id number]       [id number]       Discharge Plan   Patient and family discharge goal:  Patient interested in discharge close to [facility], plans on rehab for strengthening to be eligible for Chemotherapy. Referred from [facility] and likely to return, though this is not ideal for patient.   Provided education on discharge plan:  YES  Patient agreeable to discharge plan:  Yes, but would prefer [facility] TCU/SNF  A list of Medicare Certified Facilities was provided to the patient and/or family to encourage patient choice. Patient's choices for facility are:  [facility] is preferred   Will NH provide Skilled rehabilitation or complex medical:  YES  General information regarding anticipated insurance coverage and possible out of pocket cost was discussed. Patient and patient's family are aware patient may incur the cost of transportation to the facility, pending insurance payment: yes- Patient has MA coverage, which should cover transportation  Barriers to discharge:  Medical clearance    Discharge Recommendations   Anticipated Disposition:  Facility:  TBD-- [facility] in process  Transportation Needs:  Medical:  Stretcher  Name of Transportation Company and Phone:  [place] [employer] transportation ph:[phone]    Additional comments   1. Met with patient at bedside to discuss discharge; patient would like to be placed in a nursing home with PT capabilities near his home in [place], [place]. All of his family is there and a previous placement in [facility] left him isolated from his family and he does not want this distance again, therefore would prefer to not be in [facility]. Advised that likely return to [facility] as this was where he came from, patient would like to avoid this and has been in contact with his sister regarding placement at [facility] in [place], as he had a previous approval for admittance prior to  transfer from [facility] to [facility]. He would like rehab for strengthening as goal is to receive Chemotherapy as hopeful for treatment results.    2. Spoke with sister Yesy ([phone]), complications with previous transfer from hospital to placement at [facility] and patient was transferred to [facility]. Per sister, [facility] has not been contacted regarding an open bed for the patient.    3. Spoke with Resident JUAN J SRIVASTAVA regarding patient goals and possible discharge tomorrow. Liz, Care coordinator contacted this writer with approval for likely discharge tomorrow, time TBD tomorrow morning at 845am rounds, will check in 5/31 at 9am.    4. Contacted [facility] ([phone]) regarding possible admission to LTC. Unable to reach admissions, left message requesting return call.    5. Sent Initial SNF referral through [facility] on the [facility] to [facility], previous TCU of patient.    6. Received phone call from Abraham with [facility] placement: [phone]. Called back, unable to reach, left message for call back today.    7. Attempted to contact [facility] admissions again, unable to reach, unable to leave a message.     8. Faxed referral information to [facility] in [place], fax:[phone]. Fax successful. Called and left second message with Syed  admissions worker (gone today 5/30). Advised that patient will likely try to transfer to facility, if possible to call this writer in the morning regarding admission and or transfer information. Syed : [phone]-direct.    9. Met with patient at bedside regarding update for the day. Patient aware and this writer will check in tomorrow morning 5/31.      Jennifer Frye, SHANI, LGSW  ICU 4A, 4C, 4E   Ph: [phone]

## 2018-05-30 NOTE — PROGRESS NOTES
[facility], [place]   Palliative Care Daily Progress Note          Palliative Care was consulted for additional support and goals of care. At this time goals are clear and patient prefers emotional support from Hospital , so we will sign off. Please feel free to re-consult us if we can be helpful in the future. Thank you for the opportunity to be involved in the care of this patient.    WILFRED Brown CNP  Palliative Care Consult Team  Pager: [phone]

## 2018-05-30 NOTE — PROGRESS NOTES
[facility], [place]    Internal Medicine Progress Note - Maroon Service    Main Plans for Today   - Bolus IVFs to treat hypovolemia and hypernatremia  - Advance diet to full liquid  - Cont PO vancomycin     Assessment & Plan   Edy Uribe is a 57 year old male with a history of stage IV bladder cancer complicated by obstructive uropathy s/p bilateral nephrostomy tubes, rectal obstruction s/p diverting ostomy who presented from nursing home with blood coming from his left nephrostomy tube and hypotension, admitted to MICU with shock (hemorrhagic vs septic).  Improved and transferred to general medicine floor 5/29.     # Hemorrhagic shock, resolved: resolved following transfusions. Bleeding spontaneously stabilized from L nephrostomy tube prior to arrival. Thought to be due to tumor invasion of local vessels. Diarrhea and maroon colored stools in colostomy prompted C diff check which was positive. Now stable, continue to monitor hemoglobin.   - Urology eval'd: given stabilization of bleeding no interventions  - GI eval'd: if patient has increased bloody ostomy output would consider endoscopy but none at this time     # Stage IV bladder cancer: Patient wants to continue to be treated aggressively per palliative care discussion but understands that he may succumb to this cancer soon and is at peace with this. Ultimately wants to return to [facility] for on-going cancer treatment but needs to complete rehabilitation first.   - Anticipate d/c back to TCU in the coming days.     # Recurrent C diff colitis: Cont PO vancomycin to complete two week course, then will continue it daily until no longer receiving chemotherapy or antibiotics per GI.      # EVERTON on CKD: Improving, likely due to ischemia in setting of above shock.     # Nighttime hypoxia  With witnessed apneic episodes, CPAP provided. Patient will need sleep eval after discharge.     # Pain Assessment:  Current Pain Score  5/30/2018   Patient currently in pain? yes   Pain location Back   Pain descriptors Aching   - Edy is experiencing pain due to back muscle spasm. Pain management was discussed and the plan was created in a collaborative fashion.  Edy's response to the current recommendations: engaged  - Lidocaine patch prescribed      Diet: Full Liquid Diet  Fluids: IVF bolus prn  DVT Prophylaxis: Pneumatic Compression Devices  Code Status: DNR / DNI    Disposition Plan   Expected discharge: 2 - 3 days, recommended to prior living arrangement once hemoglobin stable and electrolytes managed with oral intake.      Entered: Lakesha Villalobos 05/30/2018, 11:14 AM   Information in the above section will display in the discharge planner report.      The patient's care was discussed with the attending, Dr. Lozano, the patient and patient's bedside nurse.     Lakesha Villalobos  [facility]  Maroon: 4  Pager: see sticky note  Please see sticky note for cross cover information    Physician Attestation  I, Kelley Lozano MD, saw this patient with the resident and agree with the resident s findings and plan of care as documented in the resident s note with my edits/additions below:    # Hypernatremia: Due to poor po free water intake and GI losses due to C.diff colitis. Mucous membranes are very dry. Encouraged him to drink more water. Will also give 1 L LR bolus.      I personally reviewed vital signs, medications, labs and imaging.    Kelley Lozano MD  Date of Service (when I saw the patient): 05/30/18            Interval History   Overnight no acute events. Hgb remains stable.     This morning patient declined out of bed mobility, reports he feels 'scared because I've been in bed for two months'. Will work with therapists with in-bed activities, however.     Physical Exam   Vital Signs: Temp: 97.6  F (36.4  C) Temp src: Axillary BP: 103/71   Heart Rate: 105 Resp: 14 SpO2: 97 % O2 Device: None (Room air) Oxygen  Delivery: 2 LPM  Weight: 231 lbs .67 oz  General: Chronically-ill appearing man in no apparent distress  Lungs: CTA b/l  CV: RRR, no murmurs  Abdomen: Soft, large suprapubic mass appreciated, nontender to palpation. Ostomy site is c/d/i with maroon colored liquid stool output.   Extremities: Pitting edema up to abdomen.   Neuro: A&O x 4      Data   Medications       sodium chloride 0.9%  500 mL Intravenous Once     sodium chloride 0.9%  1,000 mL Intravenous Once     albumin human  25 g Intravenous Once     lactated ringers  1,000 mL Intravenous Once     lidocaine  1-2 patch Transdermal Q24H     lidocaine   Transdermal Q8H     lidocaine   Transdermal Q24h     pantoprazole  40 mg Oral QAM     potassium chloride  40 mEq Oral Once     sodium chloride (PF)  3 mL Intracatheter Q8H     vancomycin  125 mg Oral 4x Daily     Data     Recent Labs  Lab 05/30/18  0341 05/29/18  1625 05/29/18  0800 05/29/18  0420  05/28/18  0607  05/28/18  0254 05/27/18  2320   WBC 10.6  --   --  12.7*  --  11.3*  < >  --  13.5*   HGB 7.9* 7.6* 7.4* 7.5*  < > 6.9*  < >  --  5.5*   MCV 87  --   --  84  --  84  < >  --  83     --   --  360  --  377  < >  --  421   INR 1.21*  --   --  1.18*  --   --   --  1.20* 1.27*   *  --   --  143  --   --   --  138 134   POTASSIUM 3.4  --   --  3.4  --   --   --  4.5 4.5   CHLORIDE 116*  --   --  111*  --   --   --  104 102   CO2 21  --   --  21  --   --   --  24 22   BUN 31*  --   --  37*  --   --   --  46* 47*   CR 1.02  --   --  1.22  --   --   --  1.55* 1.72*   ANIONGAP 11  --   --  11  --   --   --  10 10   KATHI 8.3*  --   --  8.4*  --   --   --  8.5 8.5   GLC 86  --   --  113*  --   --   --  113* 146*   ALBUMIN 1.4*  --   --  1.4*  --   --   --  1.6* 1.5*   PROTTOTAL 5.7*  --   --  5.7*  --   --   --  6.2* 5.9*   BILITOTAL 0.3  --   --  0.4  --   --   --  0.4 0.4   ALKPHOS 129  --   --  133  --   --   --  166* 159*   ALT 13  --   --  18  --   --   --  29 27   AST 24  --   --  30  --   --   --   52* 54*   TROPI  --   --   --   --   --   --   --   --  <0.015   < > = values in this interval not displayed.  No results found for this or any previous visit (from the past 24 hour(s)).

## 2018-05-30 NOTE — PLAN OF CARE
Problem: Infection, Risk/Actual (Adult)  Goal: Identify Related Risk Factors and Signs and Symptoms  Related risk factors and signs and symptoms are identified upon initiation of Human Response Clinical Practice Guideline (CPG).   Outcome: Improving  Neuro: A&Ox4.   Cardiac: SR to ST. VSS.   Respiratory: SpO2 mid 90s on RA while awake, rapid desaturation to the 60s when asleep requiring CPAP use at 40% O2.  GI/: Adequate urine output through bilateral nephrostomy tubes. Maroon BM through colostomy.  Diet/appetite: Tolerating clear liquid diet..  Activity:  Assist of 1 with repositioning  Pain: PRN oxycodone given x2 this shift.  Suspect patient's unwillingness to reposition may be related to pain, however, patient states pain is fine.     Skin: No new deficits noted.  LDA's: L TL subclavian line, 2R and 2 L PIVs    Hgb stable this AM with recheck at 1600.    Plan: Continue with POC. Notify primary team with changes.

## 2018-05-30 NOTE — PROGRESS NOTES
SPIRITUAL HEALTH SERVICES  SPIRITUAL ASSESSMENT Progress Note  [facility] ([place]) 4C     REFERRAL SOURCE: Patient Request via     Visited with pt who shared story of his treatment, specifically requesting prayers that his next round of chemo will set him on the road toward full healing. He also spoke of three powerful spiritual experiences that he had been given over the course of his life. The pt then asked to receive Holy Communion. After confirming with his nurse (via one of the nurses at the station), I gave the pt a fragment a host.    PLAN: Respond to future requests, if made by pt or family.    Fr. Alfredito Sanderson  Scientologist   Priest miller [phone]  Pager [phone]

## 2018-05-30 NOTE — PLAN OF CARE
Problem: Patient Care Overview  Goal: Plan of Care/Patient Progress Review  OT/4C:  Discharge Planner OT   Patient plan for discharge: rehab  Current status: Pt refusing OOB mobility due to pain. Facilitated UE/LE exercises to promote strengthening for ADL I. Pt requiring redirection to task  Barriers to return to prior living situation: medical status, pain, mobility, strength, ADL I   Recommendations for discharge: rehab  Rationale for recommendations: to progress ADL I and mobility        Entered by: Ro Gutierrez 05/30/2018 9:52 AM

## 2018-05-30 NOTE — PROGRESS NOTES
Internal medicine transfer acceptance note    Mr. Uribe is a 56 y/o M with history of stage IV bladder cancer complicated by obstructive uropathy (s/p bilateral nephrostomy tubes), rectal obstruction s/p diverting ostomy, s/p 1 cycle neoadjuvant chemotherapy complicated by C diff colitis who presented from his nursing home on 5/27 with new bloody nephrostomy and ostomy output and was found to be in hemorrhagic shock. He has received transfusions and is now improved, course complicated by febrile transfusion reaction and recurrent C diff colitis for which he has been started on oral vancomycin. He is now without hemodynamically significant bleeding and is transferred to the medicine floor. His hope is to be discharged to [facility] to continue his cancer treatment there but so far records have not yet been obtained to understand what his treatment options are.     S: Patient today feels 'great'. Reiterates his eagerness to go to [place] and keeps asking when he can go.     O:   Temp: 97.6  F (36.4  C) Temp src: Tympanic BP: 103/59   Heart Rate: 99 Resp: 14 SpO2: 100 % O2 Device: BiPAP/CPAP Oxygen Delivery: 2 LPM  General: Chronically-ill appearing man in no apparent distress  Lungs: CTA b/l  CV: RRR, no murmurs  Abdomen: Soft, large suprapubic mass appreciated, nontender to palpation. Ostomy site is c/d/i with minimal brown stool output.   Extremities: Pitting edema up to abdomen.   Neuro: A&O x 4    Labs reviewed.     Assessment and Plan:   # Hemorrhagic shock: resolved following transfusions. Bleeding spontaneously stabilized. Thought to be due to tumor invasion of local vessels as well as blood loss due to colitis. Now stable, continue to monitor hemoglobin.     # Stage IV bladder cancer: Patient wants to continue to be treated aggressively per palliative care discussion but understands that he may succumb to this cancer soon and is at peace with this. Ultimately wants to return to [facility]  for on-going cancer treatment. Will assess his options and discuss return transfer pending this. Will need additional records from patient's oncology team.     # Recurrent C diff colitis: Cont PO vancomycin.     # EVERTON: Improving, likely due to ischemia in setting of above shock.       Staffed by MICU team today.     DO LETICIA Posada PGY-3 [phone]  [place] [id number] Medicine Service

## 2018-05-30 NOTE — PLAN OF CARE
Problem: Patient Care Overview  Goal: Plan of Care/Patient Progress Review  PT-4C- PT Consult Order received, per chart review and communication with interdisciplinary care team pt is declining OOB mobility due to pain and pressure in bladder, pt will continue to participate in Occupational Therapy for ADLs and exercises, PT will Hold Evaluation and PT will initiate as indicated.

## 2018-05-31 NOTE — PROGRESS NOTES
Social Work Services Discharge Note      Patient Name:  Edy Uribe     Anticipated Discharge Date:  5/31/2018    Discharge Disposition:   TCU:  [facility] [place]: [phone]  Admissions [facility]: [phone]    Following MD:  Per facility assignment     Pre-Admission Screening (PAS) online form has been completed.  The Level of Care (LOC) is:  Determined  Confirmation Code is:  No PAS required for returning TCU patient.     Patient/caregiver informed of referral to [facility] for Pre-Admission Screening for skilled nursing facility (SNF) placement and to expect a phone call post discharge from SNF.     Additional Services/Equipment Arranged:  Transportation arranged, via [facility] [employer] stretcher: [phone]     Patient / Family response to discharge plan:    Patient and family disappointed in [facility] discharge to [facility] as the family is unable to visit to this due to the long distance, but patient is in agreement of plan to discharge to [facility] and receive assistance from  at TCU for transition to [place] [facility].     Persons notified of above discharge plan:  Patient, at bedside. Call to sister Daniela and provided plan details regarding [facility] and transition to [place]. Daniela advised that she will pass along information to sister Yesy, and patient's girlfriend and discuss [place] placement options.    Staff Discharge Instructions:  Please fax discharge orders and signed hard scripts for any controlled substances.  Please print a packet and send with patient.     CTS Handoff completed:  No- no primary listed, patient has primary at TCU.     Medicare Notice of Rights provided to the patient/family:  Patient does not have Medicare, Insurance [place] coverage.    SHANI Horvath, ROSSANA  ICU 4A, 4C, 4E   Ph: [phone]

## 2018-05-31 NOTE — PLAN OF CARE
Problem: Infection, Risk/Actual (Adult)  Goal: Identify Related Risk Factors and Signs and Symptoms  Related risk factors and signs and symptoms are identified upon initiation of Human Response Clinical Practice Guideline (CPG).   Outcome: No Change  VSS. Room air. RR stable. Increasing urine leakage from R nephrostomy tube. MD Villalobos aware. MD said pt would have to follow up with a nephrology consult.      Problem: Renal Insufficiency Comorbidity  Goal: Renal Insufficiency  Patient comorbidity will be monitored for signs and symptoms of Renal Insufficiency (Chronic) condition.  Problems will be absent, minimized or managed by discharge/transition of care.   Outcome: Improving  Both nephrostomy tubes had saul urine output.     Problem: Patient Care Overview  Goal: Plan of Care/Patient Progress Review  Outcome: No Change  D: VSS. RA. SpO2 > 95% on RA, pt occasionally desats with sleeping, refused BiPAP when asked, put on NC and desated to 77% when on 3 L, does not need oxygen when awake. Okay with wearing BiPAP once and SpO2 at 100%.   A/I: Pt intermittently frustrated when asked if he would like to reposition and asking orientation questions. Refused any turns. Refused to have back seen until 3pm, changed neph site dressings and R neph site soaking with urine, a big pool of urine underneath pt MD Wilfredo mccann - MD said he would have to follow up with outside. Pt had good appetite. A&O.    P: Pt transferred to ambulance at 330pm to go to [facility].

## 2018-05-31 NOTE — DISCHARGE SUMMARY
[facility] Discharge Summary    Edy Uribe MRN# [id number]   Age: 57 year old YOB: 1960     Date of Admission:  5/27/2018  Date of Discharge::  5/31/2018  Admitting Physician:  Edison Baltazar MD  Discharge Physician:  Lakesha Villalobos MD             Admission Diagnoses:   GI bleed [K92.2]          Discharge Diagnosis:   Hemorrhagic shock, resolved  Stage IV bladder cancer  Recurrent C diff colitis  Hypernatremia  EVERTON on CKD, resolved  Nighttime hypoxia          Procedures:   None          Medications Prior to Admission:     Prescriptions Prior to Admission   Medication Sig Dispense Refill Last Dose     Acetaminophen (TYLENOL PO) Take 500 mg by mouth every 8 hours as needed for mild pain or fever        ascorbic acid (VITAMIN C) 500 MG CPCR CR capsule Take 500 mg by mouth daily        ferrous sulfate (IRON) 325 (65 Fe) MG tablet Take by mouth daily (with breakfast)        lidocaine (LIDODERM) 5 % Patch Place 1 patch onto the skin every 24 hours        MAGNESIUM OXIDE PO Take 400 mg by mouth daily        METOPROLOL TARTRATE PO Take 12.5 mg by mouth 2 times daily        OXYCODONE HCL PO Take 5 mg by mouth every 3 hours as needed        Prochlorperazine Maleate (COMPAZINE PO) Take 10 mg by mouth every 6 hours as needed for nausea                Discharge Medications:     Current Discharge Medication List      START taking these medications    Details   pantoprazole (PROTONIX) 40 MG EC tablet Take 1 tablet (40 mg) by mouth every morning  Qty: 30 tablet    Associated Diagnoses: Gastroesophageal reflux disease, esophagitis presence not specified      vancomycin (VANCOCIN HCL) 125 MG capsule Take 1 capsule (125 mg) by mouth daily    Associated Diagnoses: C. difficile colitis      vancomycin (VANOCIN) 50 mg/mL LIQD solution Take 2.5 mLs (125 mg) by mouth 4 times daily for 11 days  Qty: 110 mL, Refills: 0    Associated Diagnoses: C. difficile colitis         CONTINUE these medications  which have NOT CHANGED    Details   Acetaminophen (TYLENOL PO) Take 500 mg by mouth every 8 hours as needed for mild pain or fever      ascorbic acid (VITAMIN C) 500 MG CPCR CR capsule Take 500 mg by mouth daily      ferrous sulfate (IRON) 325 (65 Fe) MG tablet Take by mouth daily (with breakfast)      lidocaine (LIDODERM) 5 % Patch Place 1 patch onto the skin every 24 hours      MAGNESIUM OXIDE PO Take 400 mg by mouth daily      METOPROLOL TARTRATE PO Take 12.5 mg by mouth 2 times daily      OXYCODONE HCL PO Take 5 mg by mouth every 3 hours as needed      Prochlorperazine Maleate (COMPAZINE PO) Take 10 mg by mouth every 6 hours as needed for nausea                   Consultations:     Urology  Gastroenterology  Palliative care          Brief History of Illness:     Edy Uribe is a 57 year old male with a history of stage IV bladder cancer complicated by obstructive uropathy s/p bilateral nephrostomy tubes, rectal obstruction s/p diverting ostomy who presented for blood coming from his left nephrostomy tube from nursing home.      Upon EMS arrival, patient was hypotensive with blood pressures about 84/52. He had a blood glucose of 155 and heart rate of 120 bpm per EMS. While en route to the ED he was given 600 units of fluid. EMS also note that patient was given oxycodone by nursing home staff just prior to leaving around 22:30 for his chronic back pain.      History from patient is limited from patient due to somnolence. He denies pain in chest, abdomen. He reports chronic back pain which is unchanged from baseline. He denies difficulty breathing. He reports the bleeding from ostomy and nephrostomy have been occurring for the past several days.           Hospital Course:     # Hemorrhagic shock, resolved:  Presented with hemoglobin of 5.5, responded appropriately to transfusions. Due to blood loss from both L nephrostomy tube as well as colostomy. Bleeding spontaneously stabilized from L nephrostomy tube  prior to arrival. Thought to be due to tumor invasion of local vessels. Urology evaluated patient and did not believe any interventions were needed. Diarrhea and maroon colored stools in colostomy prompted C diff check which was positive. Gastroenterology evaluated patient and decided to do endoscopy if patient had on-going bleeding and hemoglobin drops but his hemoglobin has been stable. Continues to have some maroon colored stools, but has eaten lots of red-dye containing products (popsickles, sorbet, etc). Reassuring that his hemoglobin has been improving.   - Recheck CBC 6/4 at TCU    # Recurrent C diff colitis: Likely causing GI blood loss as above. Cont PO vancomycin QID to complete two week course, then will continue it daily until no longer receiving chemotherapy or antibiotics per GI.     # Hypernatremia  To 147 on 5/30. Had been receiving normal saline due to NPO status and had been losing free water via diarrhea. Improved with encouragement of oral free water intake.       # Stage IV bladder cancer: Patient wants to continue to be treated aggressively per palliative care discussion but understands that he may succumb to this cancer soon and is at peace with this. Ultimately wants to return to [facility] for on-going cancer treatment but needs to complete rehabilitation first. Prognosis remains guarded.   - Cont rehabilitation as able at the TCU  - Follow-up with oncology as previously arranged      # EVERTON on CKD: Presented with Cr of 1.7, now back to baseline of 1. Likely due to ischemia in setting of above shock.      # Nighttime hypoxia  With witnessed apneic episodes, CPAP provided but patient declined. Patient will need sleep eval after discharge.     Day of discharge exam:   Temp: 97.9  F (36.6  C) Temp src: Oral BP: 105/70   Heart Rate: 106 Resp: 14 SpO2: 97 % O2 Device: None (Room air)   General: Chronically ill and anasarcic appearing man in no apparent distress  HEENT: Anicteric  conjunctiva  Lungs: CTA b/l  CV: Mildly tachycardic but RRR  Abdomen: Soft, nontender. Large suprapubic mass in the size of a grapefruit that is hard, nontender.  Extremities: Warm, 3+ pitting edema up to abdomen          Discharge Instructions and Follow-Up:   Discharge diet: Regular   Discharge activity: Activity as tolerated   Discharge follow-up: Follow up with oncology as previously arranged  CBC/BMP on 6/4/18           Discharge Disposition:   Discharged to short-term care facility      Patient discussed with attending, Dr. Lozano, on the day of discharge.     Lakesha Villalobos DO   PGY-3 [phone]

## 2018-05-31 NOTE — PLAN OF CARE
Problem: Patient Care Overview  Goal: Plan of Care/Patient Progress Review  OT/4C - Discharge Planner OT   Patient plan for discharge: rehab facility closer to home  Current status:  Facilitated UE/LE exercise working to increase functional strength required for ADL and to prevent further deconditioning during hospitalization.  Pt declining OOB or sitting EOB activity due to pain. UE exercises facilitated with 3lb free weights and LE exercises with A/AROM due to weakness.  VSS during exercise;  bpm, spO2 100% on 3L NC, /69 (MAP 83).   Barriers to return to prior living situation: activity tolerance, pain, strength  Recommendations for discharge: TCU  Rationale for recommendations: to increase activity tolerance, strength, and IND with ADL       Entered by: Cammie Medina 05/31/2018 9:39 AM

## 2018-05-31 NOTE — PROGRESS NOTES
Social Work Services Progress Note    Hospital Day: 5  Date of Initial Social Work Evaluation:  5/30/18  Collaborated with:  Patient, sister Yesy: ([phone]), Care Coordinator Liz [phone]), Medical team, Yari SRIVASTAVA    Data:      Latest RN note states transfer order to adult med/surg. Steps started 5/30/18 for possible discharge to [facility] [place] [facility] ([phone]), where he was prior to hospitalization, and [facility] in [place] ([phone]). [facility]  admissions contact, Syed: [phone]-direct. No update from [facility] for intake- will arrange with [facility] [place].     Update from care conference and RNCC, patient ready to discharge today, has been cleared.     Intervention:      1. Confirmed at rounds of discharge clearance.  2. Sent page to JUAN J Villalobos MD, regarding discharge clearance. Requested orders to be completed and any narcotics will need paper rx for discharge.   3. Received message from [facility] [place] coordinatorAbraham: [phone] requesting arrival after 3pm. Also requested resend of updated notes as TT fax was down yesterday.   4. Resent initial SNF referral through [facility].  5. Transport arranged with [facility] [phone], 330pm  to [facility] [place] in [place] via ambulance/stretcher.  6. Called Abraham with [facility]- advised to 330pm /transport to [facility] [place]. Requested confirmation of paperwork arrival- he will call back with confirmation.    7. Received message from patient's other sister Daniela ([phone]), regarding a different facility in the [facility] that does have a bed open and may be able to accept the patient after review. Jaison Senior     Assessment:      Patient not pleased about discharge to [facility] [place] in [facility]; he will want assistance from [facility] [place] to facilitate transfer to [facility].    Plan:    Anticipated Disposition:  Facility:  [facility]  [place]    Barriers to d/c plan:  CDif precautions, has been immobile throughout stay, pain    Follow Up:    [facility] Transport: [phone] arriving 330pm. Has Unit 4c phone number.   RX orders needed by MD for Jose Angel Frye, SHANI, LGSW  ICU 4A, 4C, 4E   Ph: [phone]

## 2018-06-01 NOTE — PLAN OF CARE
Problem: Patient Care Overview  Goal: Plan of Care/Patient Progress Review  Occupational Therapy Discharge Summary    Reason for therapy discharge:    Discharged to transitional care facility.    Progress towards therapy goal(s). See goals on Care Plan in [facility] electronic health record for goal details.  Goals not met.  Barriers to achieving goals:   discharge from facility.    Therapy recommendation(s):    Continued therapy is recommended.  Rationale/Recommendations:  to increase strength, activity tolerance, and IND with ADL.

## 2018-08-09 LAB
TRANSF REACT PLASRBC-IMP: NORMAL
TRANSF REACT PLASRBC-IMP: NORMAL

## 2021-08-05 NOTE — PROGRESS NOTES
"SPIRITUAL HEALTH SERVICES  SPIRITUAL ASSESSMENT Progress Note  [facility] ([place] 4C     PRIMARY FOCUS:     Support for coping    REFERRAL SOURCE: Patient requested a [facility] encounter.     ILLNESS CIRCUMSTANCES:   Reviewed documentation. Reflective conversation shared with patient  which integrated elements of illness and family narratives.     Context of Serious Illness/Symptom(s) - Patient shared with me his journey which started with Stage IV bladder cancer and has become complicated with other infections and medical needs.     Resources for Support - Patient states that he has 14 siblings that were all here this week.     DISTRESS:     Emotional/Spiritual/Existential Distress - Patient states that he has heard many testimonials of healing. So he states that even when everything seems to be going wrong he still is hopeful.    Faith Distress - He states that he asked God to receive the Holy Spirit and what he felt was almost more than he could handle, very powerful presence.    Social/Cultural/Economic Distress - Not discussed.     SPIRITUAL/Anglican COPING:     Catholic/Vivien - Voodoo    Spiritual Practice(s) - Patient has a very personal vivien, relies on scripture. He takes part in an outreach ministry every Tuesday.  Has received the anointing of the sick many times and asked for communion today. ( I checked with the nurse and he is on a liquid diet and can not have communion for now, she will page me if this changes. )  Patient asked for prayer.  I introduced  and offered a  visit and he said \"yes\".     Emotional/Relational/Existential Connections - Patient asked for prayer that he can return to the [place] area so he can be closer to family and friends.    GOALS OF CARE:    Goals of Care - Patient is still hoping that he can get more treatment for his bladder cancer, but is also trying to endure what comes his way with vivien and trust.     Meaning/Sense-Making - He feels that this illness is a test " for him and he hopes to lead others to God through this illness.    PLAN: I will let the  know of the request.  I gave my page number to the staff in case he is able to take communion.  I will let the unit  know of the care provided and that the patient would be open to   support during this hospitalization.    Rachna Menchaca  Staff   Pager [phone]   No